# Patient Record
Sex: MALE | Race: BLACK OR AFRICAN AMERICAN | NOT HISPANIC OR LATINO | Employment: OTHER | URBAN - METROPOLITAN AREA
[De-identification: names, ages, dates, MRNs, and addresses within clinical notes are randomized per-mention and may not be internally consistent; named-entity substitution may affect disease eponyms.]

---

## 2017-01-03 ENCOUNTER — OFFICE VISIT (OUTPATIENT)
Dept: CARDIOLOGY | Facility: CLINIC | Age: 62
End: 2017-01-03
Payer: MEDICAID

## 2017-01-03 VITALS
SYSTOLIC BLOOD PRESSURE: 160 MMHG | BODY MASS INDEX: 23.62 KG/M2 | HEIGHT: 70 IN | WEIGHT: 165 LBS | HEART RATE: 80 BPM | DIASTOLIC BLOOD PRESSURE: 100 MMHG

## 2017-01-03 DIAGNOSIS — Z01.810 PREOPERATIVE CARDIOVASCULAR EXAMINATION: ICD-10-CM

## 2017-01-03 DIAGNOSIS — R94.31 ABNORMAL ECG: ICD-10-CM

## 2017-01-03 DIAGNOSIS — G89.4 CHRONIC PAIN SYNDROME: Primary | ICD-10-CM

## 2017-01-03 DIAGNOSIS — R00.2 PALPITATIONS: ICD-10-CM

## 2017-01-03 DIAGNOSIS — E78.49 OTHER HYPERLIPIDEMIA: ICD-10-CM

## 2017-01-03 DIAGNOSIS — I10 ESSENTIAL HYPERTENSION: ICD-10-CM

## 2017-01-03 PROCEDURE — 99213 OFFICE O/P EST LOW 20 MIN: CPT | Mod: PBBFAC,25 | Performed by: NURSE PRACTITIONER

## 2017-01-03 PROCEDURE — 99999 PR PBB SHADOW E&M-EST. PATIENT-LVL III: CPT | Mod: PBBFAC,,, | Performed by: NURSE PRACTITIONER

## 2017-01-03 PROCEDURE — 99214 OFFICE O/P EST MOD 30 MIN: CPT | Mod: S$PBB,,, | Performed by: NURSE PRACTITIONER

## 2017-01-03 PROCEDURE — 93010 ELECTROCARDIOGRAM REPORT: CPT | Mod: S$PBB,,, | Performed by: INTERNAL MEDICINE

## 2017-01-03 PROCEDURE — 93005 ELECTROCARDIOGRAM TRACING: CPT | Mod: PBBFAC | Performed by: INTERNAL MEDICINE

## 2017-01-03 RX ORDER — METOPROLOL SUCCINATE 25 MG/1
25 TABLET, EXTENDED RELEASE ORAL DAILY
Qty: 30 TABLET | Refills: 11 | Status: SHIPPED | OUTPATIENT
Start: 2017-01-03 | End: 2017-10-19 | Stop reason: SDUPTHER
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
## 2017-01-03 NOTE — MR AVS SNAPSHOT
[facility] - Cardiology  [address] [address]  Phone: [phone]  Fax: [phone]                  Cooper Pope Jr.   1/3/2017 1:30 PM   Office Visit    Description:  Male : 1955   Provider:  Mesfin Gracia NP   Department:  [facility] - Cardiology           Reason for Visit     Hypertension     Chest Pain           Diagnoses this Visit        Comments    Chronic pain syndrome    -  Primary     Essential hypertension         Palpitations         Abnormal ECG         Preoperative cardiovascular examination         Other hyperlipidemia                To Do List           Future Appointments        Provider Department Dept Phone    2017 9:15 AM [facility] [address] [facility] [phone]    2017 10:30 AM Cooper Harper MD [facility] - Neurosurgery [facility] [phone]    2017 9:40 AM LABORATORY, [address] [facility] [phone]    2017 10:00 AM Cooper Parekh IV, MD [facility] Urology [phone]      Goals (5 Years of Data)     None      Follow-Up and Disposition     Return in about 3 weeks (around 2017).       These Medications        Disp Refills Start End    metoprolol succinate (TOPROL-XL) 25 MG 24 hr tablet 30 tablet 11 1/3/2017     Take 1 tablet (25 mg total) by mouth once daily. - Oral    [facility] [address] #: [phone]         [facility]     [facility] -  Assistance  Registered nurses in the [facility] provide clinical advisement, health education, appointment booking, and other advisory services.  Call for this free service at [phone].             Medications           START taking these NEW medications        Refills    metoprolol succinate (TOPROL-XL) 25 MG 24 hr tablet 11    Sig: Take 1 tablet (25 mg total) by mouth once daily.    Class: Normal    Route: Oral      STOP taking these  "medications     nebivolol 20 mg Tab Take 20 mg by mouth 2 (two) times daily. Patient takes 2 tablets every am           Verify that the below list of medications is an accurate representation of the medications you are currently taking.  If none reported, the list may be blank. If incorrect, please contact your healthcare provider. Carry this list with you in case of emergency.           Current Medications     amlodipine-benazepril 5-20 mg (LOTREL) 5-20 mg per capsule Take 2 capsules by mouth once daily.    atorvastatin (LIPITOR) 10 MG tablet Take 1 tablet (10 mg total) by mouth once daily.    cloNIDine (CATAPRES) 0.3 MG tablet Take 1 tablet (0.3 mg total) by mouth 3 (three) times daily.    cyanocobalamin (VITAMIN B-12) 1,000 mcg/mL injection Inject 1 mL (1,000 mcg total) into the muscle every 30 days.    donepezil (ARICEPT) 10 MG tablet Take 1 tablet (10 mg total) by mouth every evening.    furosemide (LASIX) 40 MG tablet Take 1 tablet (40 mg total) by mouth once daily.    megestrol (MEGACE) 400 mg/10 mL (40 mg/mL) Susp Take 20 mLs (800 mg total) by mouth once daily.    metoprolol succinate (TOPROL-XL) 25 MG 24 hr tablet Take 1 tablet (25 mg total) by mouth once daily.    multivit-iron-FA-calcium-mins 9 mg iron-400 mcg Tab tablet Take 1 tablet by mouth once daily.    oxycodone (ROXICODONE) 30 MG Tab Take 1 tablet (30 mg total) by mouth daily as needed.    promethazine (PHENERGAN) 25 MG tablet Take 1 tablet (25 mg total) by mouth 2 (two) times daily as needed for Nausea.           Clinical Reference Information           Vital Signs - Last Recorded  Most recent update: 1/3/2017  1:52 PM by Altagracia Ceballos LPN    BP Pulse Ht Wt BMI    (!) 160/100 (BP Location: Left arm, Patient Position: Sitting, BP Method: Manual) 80 5' 10" (1.778 m) 74.9 kg (165 lb 0.2 oz) 23.68 kg/m2      Blood Pressure          Most Recent Value    BP  (!)  160/100      Allergies as of 1/3/2017     Tylenol [Acetaminophen]      Immunizations " Administered on Date of Encounter - 1/3/2017     None      Orders Placed During Today's Visit      Normal Orders This Visit    EKG 12-lead
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
## 2017-01-16 ENCOUNTER — OFFICE VISIT (OUTPATIENT)
Dept: FAMILY MEDICINE | Facility: CLINIC | Age: 62
End: 2017-01-16
Payer: MEDICAID

## 2017-01-16 VITALS
OXYGEN SATURATION: 98 % | TEMPERATURE: 98 F | HEART RATE: 94 BPM | DIASTOLIC BLOOD PRESSURE: 116 MMHG | WEIGHT: 162.25 LBS | HEIGHT: 70 IN | SYSTOLIC BLOOD PRESSURE: 170 MMHG | BODY MASS INDEX: 23.23 KG/M2 | RESPIRATION RATE: 18 BRPM

## 2017-01-16 DIAGNOSIS — M48.061 SPINAL STENOSIS OF LUMBAR REGION WITH RADICULOPATHY: ICD-10-CM

## 2017-01-16 DIAGNOSIS — G89.4 CHRONIC PAIN SYNDROME: ICD-10-CM

## 2017-01-16 DIAGNOSIS — I10 ESSENTIAL HYPERTENSION: Primary | ICD-10-CM

## 2017-01-16 DIAGNOSIS — M54.16 SPINAL STENOSIS OF LUMBAR REGION WITH RADICULOPATHY: ICD-10-CM

## 2017-01-16 DIAGNOSIS — R11.0 NAUSEA: ICD-10-CM

## 2017-01-16 PROCEDURE — 99214 OFFICE O/P EST MOD 30 MIN: CPT | Mod: S$PBB,,, | Performed by: FAMILY MEDICINE

## 2017-01-16 PROCEDURE — 99215 OFFICE O/P EST HI 40 MIN: CPT | Mod: PBBFAC,PO | Performed by: FAMILY MEDICINE

## 2017-01-16 PROCEDURE — 99999 PR PBB SHADOW E&M-EST. PATIENT-LVL V: CPT | Mod: PBBFAC,,, | Performed by: FAMILY MEDICINE

## 2017-01-16 RX ORDER — OXYCODONE HYDROCHLORIDE 30 MG/1
30 TABLET ORAL DAILY PRN
Qty: 30 TABLET | Refills: 0 | Status: SHIPPED | OUTPATIENT
Start: 2017-01-16 | End: 2017-02-13 | Stop reason: SDUPTHER

## 2017-01-16 RX ORDER — PROMETHAZINE HYDROCHLORIDE 25 MG/1
25 TABLET ORAL 2 TIMES DAILY PRN
Qty: 60 TABLET | Refills: 0 | Status: SHIPPED | OUTPATIENT
Start: 2017-01-16 | End: 2017-02-13 | Stop reason: SDUPTHER
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
## 2017-01-16 NOTE — MR AVS SNAPSHOT
[facility]  [address] [facility] [address]  Phone: [phone]                  Cooper Pope Jr.   2017 1:40 PM   Office Visit    Description:  Male : 1955   Provider:  Leigha Diallo MD   Department:  [facility]           Reason for Visit     Back Pain           Diagnoses this Visit        Comments    Intractable vomiting with nausea, unspecified vomiting type         Spinal stenosis of lumbar region with radiculopathy         Chronic pain syndrome                To Do List           Future Appointments        Provider Department Dept Phone    2017 1:00 PM Mesfin Gracia NP [facility] [phone]    2017 9:15 AM [facility] CT2 64- LIMIT 600 LBS [facility] [phone]    2017 10:30 AM Cooper Harper MD [facility] - Neurosurgery [facility] [phone]    2017 11:00 AM Leigha Diallo MD [facility] [phone]    2017 9:40 AM LABORATORY, [address] [facility] [phone]      Goals (5 Years of Data)     None       These Medications        Disp Refills Start End    promethazine (PHENERGAN) 25 MG tablet 60 tablet 0 2017     Take 1 tablet (25 mg total) by mouth 2 (two) times daily as needed for Nausea. - Oral    [facility] [address] Ph #: [phone]       oxycodone (ROXICODONE) 30 MG Tab 30 tablet 0 2017     Take 1 tablet (30 mg total) by mouth daily as needed. - Oral    Ph[facility]: [facility] [address] Ph #: [phone]         [facility]     [facility] Nurse Care [facility] -  Assistance  Registered nurses in the [facility] provide clinical advisement, health education, appointment booking, and other advisory services.  Call for this free service at [phone].             Medications                Verify that the below list of medications is an  "accurate representation of the medications you are currently taking.  If none reported, the list may be blank. If incorrect, please contact your healthcare provider. Carry this list with you in case of emergency.           Current Medications     amlodipine-benazepril 5-20 mg (LOTREL) 5-20 mg per capsule Take 2 capsules by mouth once daily.    atorvastatin (LIPITOR) 10 MG tablet Take 1 tablet (10 mg total) by mouth once daily.    cloNIDine (CATAPRES) 0.3 MG tablet Take 1 tablet (0.3 mg total) by mouth 3 (three) times daily.    cyanocobalamin (VITAMIN B-12) 1,000 mcg/mL injection Inject 1 mL (1,000 mcg total) into the muscle every 30 days.    donepezil (ARICEPT) 10 MG tablet Take 1 tablet (10 mg total) by mouth every evening.    furosemide (LASIX) 40 MG tablet Take 1 tablet (40 mg total) by mouth once daily.    megestrol (MEGACE) 400 mg/10 mL (40 mg/mL) Susp Take 20 mLs (800 mg total) by mouth once daily.    metoprolol succinate (TOPROL-XL) 25 MG 24 hr tablet Take 1 tablet (25 mg total) by mouth once daily.    multivit-iron-FA-calcium-mins 9 mg iron-400 mcg Tab tablet Take 1 tablet by mouth once daily.    oxycodone (ROXICODONE) 30 MG Tab Take 1 tablet (30 mg total) by mouth daily as needed.    promethazine (PHENERGAN) 25 MG tablet Take 1 tablet (25 mg total) by mouth 2 (two) times daily as needed for Nausea.           Clinical Reference Information           Vital Signs - Last Recorded  Most recent update: 1/16/2017  1:57 PM by Bhumika Piña MA    BP Pulse Temp Resp    (!) 170/116 (BP Location: Right arm, Patient Position: Sitting, BP Method: Manual) 94 97.5 °F (36.4 °C) (Tympanic) 18    Ht Wt SpO2 BMI    5' 10" (1.778 m) 73.6 kg (162 lb 4.1 oz) 98% 23.28 kg/m2      Blood Pressure          Most Recent Value    BP  (!)  170/116      Allergies as of 1/16/2017     Tylenol [Acetaminophen]      Immunizations Administered on Date of Encounter - 1/16/2017     None      Orders Placed During Today's Visit      Normal " Orders This Visit    Ambulatory referral to Pain Clinic

## 2017-01-19 ENCOUNTER — OFFICE VISIT (OUTPATIENT)
Dept: PAIN MEDICINE | Facility: CLINIC | Age: 62
End: 2017-01-19
Payer: MEDICAID

## 2017-01-19 VITALS
HEIGHT: 72 IN | WEIGHT: 167 LBS | BODY MASS INDEX: 22.62 KG/M2 | SYSTOLIC BLOOD PRESSURE: 193 MMHG | DIASTOLIC BLOOD PRESSURE: 117 MMHG | HEART RATE: 92 BPM

## 2017-01-19 DIAGNOSIS — G89.4 CHRONIC PAIN SYNDROME: Primary | ICD-10-CM

## 2017-01-19 DIAGNOSIS — M96.1 FAILED BACK SYNDROME OF LUMBAR SPINE: ICD-10-CM

## 2017-01-19 PROCEDURE — 99213 OFFICE O/P EST LOW 20 MIN: CPT | Mod: PBBFAC | Performed by: ANESTHESIOLOGY

## 2017-01-19 PROCEDURE — 99203 OFFICE O/P NEW LOW 30 MIN: CPT | Mod: S$PBB,,, | Performed by: ANESTHESIOLOGY

## 2017-01-19 PROCEDURE — 99999 PR PBB SHADOW E&M-EST. PATIENT-LVL III: CPT | Mod: PBBFAC,,, | Performed by: ANESTHESIOLOGY

## 2017-01-19 NOTE — LETTER
January 19, 2017      Leigha Diallo MD  [address]  [facility] [address]           [facility]  [id number] [place] [address]  Phone: [phone]  Fax: [phone]          Patient: Cooper Pope Jr.   MR Number: [id number]   YOB: 1955   Date of Visit: 1/19/2017       Dear Dr. Leigha Diallo:    Thank you for referring Cooper Pope to me for evaluation. Attached you will find relevant portions of my assessment and plan of care.    If you have questions, please do not hesitate to call me. I look forward to following Cooper Pope along with you.    Sincerely,    Yogi Holloway MD    Enclosure  CC:  No Recipients    If you would like to receive this communication electronically, please contact [email or web address] or [phone] to request more information on mytrax Link access.    For providers and/or their staff who would like to refer a patient to [facility], please contact us through our one-stop-shop provider referral line, [facility] , at [phone].    If you feel you have received this communication in error or would no longer like to receive these types of communications, please e-mail [email or web address]

## 2017-01-27 ENCOUNTER — TELEPHONE (OUTPATIENT)
Dept: NEUROSURGERY | Facility: CLINIC | Age: 62
End: 2017-01-27

## 2017-01-27 NOTE — TELEPHONE ENCOUNTER
Spoke with pt. CT and follow up appointment with Dr. Harper rescheduled for 2/20/2017. Appointment slips in the mail. Pt verbalized understanding.

## 2017-01-27 NOTE — TELEPHONE ENCOUNTER
----- Message from Liss Sprague sent at 1/26/2017  4:05 PM CST -----  Contact: self [phone]  Please call him to reschedule the CT and your appt.  Thank you!

## 2017-02-09 ENCOUNTER — TELEPHONE (OUTPATIENT)
Dept: FAMILY MEDICINE | Facility: CLINIC | Age: 62
End: 2017-02-09

## 2017-02-09 NOTE — TELEPHONE ENCOUNTER
----- Message from Brandee Aceves sent at 2/8/2017  1:43 PM CST -----  Contact: Pt  Pt request call from nurse because the pain Dr he was referred to states his insurance will not pay for injections, please contact pt at [phone]

## 2017-02-13 ENCOUNTER — OFFICE VISIT (OUTPATIENT)
Dept: FAMILY MEDICINE | Facility: CLINIC | Age: 62
End: 2017-02-13
Payer: MEDICAID

## 2017-02-13 ENCOUNTER — TELEPHONE (OUTPATIENT)
Dept: FAMILY MEDICINE | Facility: CLINIC | Age: 62
End: 2017-02-13

## 2017-02-13 VITALS
HEIGHT: 70 IN | TEMPERATURE: 99 F | RESPIRATION RATE: 18 BRPM | DIASTOLIC BLOOD PRESSURE: 90 MMHG | SYSTOLIC BLOOD PRESSURE: 140 MMHG | WEIGHT: 160.5 LBS | BODY MASS INDEX: 22.98 KG/M2 | HEART RATE: 86 BPM

## 2017-02-13 DIAGNOSIS — R63.4 WEIGHT LOSS: ICD-10-CM

## 2017-02-13 DIAGNOSIS — I10 HTN (HYPERTENSION), BENIGN: ICD-10-CM

## 2017-02-13 DIAGNOSIS — E53.8 VITAMIN B12 DEFICIENCY: ICD-10-CM

## 2017-02-13 DIAGNOSIS — M48.061 SPINAL STENOSIS OF LUMBAR REGION WITH RADICULOPATHY: Primary | ICD-10-CM

## 2017-02-13 DIAGNOSIS — R11.0 NAUSEA: ICD-10-CM

## 2017-02-13 DIAGNOSIS — M54.16 SPINAL STENOSIS OF LUMBAR REGION WITH RADICULOPATHY: Primary | ICD-10-CM

## 2017-02-13 DIAGNOSIS — G89.4 CHRONIC PAIN SYNDROME: ICD-10-CM

## 2017-02-13 PROCEDURE — 99213 OFFICE O/P EST LOW 20 MIN: CPT | Mod: PBBFAC,PO | Performed by: FAMILY MEDICINE

## 2017-02-13 PROCEDURE — 99999 PR PBB SHADOW E&M-EST. PATIENT-LVL III: CPT | Mod: PBBFAC,,, | Performed by: FAMILY MEDICINE

## 2017-02-13 PROCEDURE — 99214 OFFICE O/P EST MOD 30 MIN: CPT | Mod: S$PBB,,, | Performed by: FAMILY MEDICINE

## 2017-02-13 RX ORDER — CLONIDINE HYDROCHLORIDE 0.3 MG/1
0.3 TABLET ORAL 3 TIMES DAILY
Qty: 90 TABLET | Refills: 5 | Status: SHIPPED | OUTPATIENT
Start: 2017-02-13 | End: 2017-08-02 | Stop reason: SDUPTHER

## 2017-02-13 RX ORDER — PROMETHAZINE HYDROCHLORIDE 25 MG/1
25 TABLET ORAL 2 TIMES DAILY PRN
Qty: 60 TABLET | Refills: 0 | Status: SHIPPED | OUTPATIENT
Start: 2017-02-13 | End: 2017-08-02 | Stop reason: SDUPTHER

## 2017-02-13 RX ORDER — OXYCODONE HYDROCHLORIDE 30 MG/1
30 TABLET ORAL DAILY PRN
Qty: 30 TABLET | Refills: 0 | Status: SHIPPED | OUTPATIENT
Start: 2017-02-13 | End: 2017-04-13 | Stop reason: SDUPTHER

## 2017-02-13 RX ORDER — CYANOCOBALAMIN 1000 UG/ML
1000 INJECTION, SOLUTION INTRAMUSCULAR; SUBCUTANEOUS
Qty: 10 ML | Refills: 5 | Status: SHIPPED | OUTPATIENT
Start: 2017-02-13 | End: 2017-08-02 | Stop reason: SDUPTHER

## 2017-02-13 RX ORDER — MEGESTROL ACETATE 40 MG/ML
800 SUSPENSION ORAL DAILY
Qty: 480 ML | Refills: 0 | Status: SHIPPED | OUTPATIENT
Start: 2017-02-13 | End: 2017-06-14 | Stop reason: SDUPTHER

## 2017-02-13 NOTE — MR AVS SNAPSHOT
[facility]  [address] [facility]  [place] [address]  Phone: [phone]                  Cooper Pope Jr.   2017 1:00 PM   Office Visit    Description:  Male : 1955   Provider:  Leigha Diallo MD   Department:  [facility]           Reason for Visit     Back Pain           Diagnoses this Visit        Comments    Spinal stenosis of lumbar region    -  Primary     Nausea         Vitamin B12 deficiency         HTN (hypertension), benign         Weight loss         Spinal stenosis of lumbar region with radiculopathy         Chronic pain syndrome                To Do List           Future Appointments        Provider Department Dept Phone    2017 10:30 AM [facility] CT OP LIMIT 450 LBS [facility] [phone]    2017 11:30 AM Cooper Harper MD [facility] Spine Services [phone]    2017 11:00 AM Leigha Diallo MD [facility] [phone]    2017 9:40 AM LABORATORY, [address] [facility] [phone]    2017 10:00 AM [facility] [phone]      Goals (5 Years of Data)     None       These Medications        Disp Refills Start End    promethazine (PHENERGAN) 25 MG tablet 60 tablet 0 2017     Take 1 tablet (25 mg total) by mouth 2 (two) times daily as needed for Nausea. - Oral    Pharmacy: [facility] [place] [address] #: [phone]       cyanocobalamin (VITAMIN B-12) 1,000 mcg/mL injection 10 mL 5 2017     Inject 1 mL (1,000 mcg total) into the muscle every 30 days. - Intramuscular    Pharmacy: [facility] [place] - [address] [place] [address] Ph #: [phone]       cloNIDine (CATAPRES) 0.3 MG tablet 90 tablet 5 2017     Take 1 tablet (0.3 mg total) by mouth 3 (three) times daily. - Oral    Pharmacy: [facility] [place] - [address] [place] [address] Ph #: [phone]       megestrol (MEGACE) 400 mg/10 mL  (40 mg/mL) Susp 480 mL 0 2/13/2017 2/13/2018    Take 20 mLs (800 mg total) by mouth once daily. - Oral    Pharmacy: [facility] [address] #: [phone]       oxycodone (ROXICODONE) 30 MG Tab 30 tablet 0 2/13/2017     Take 1 tablet (30 mg total) by mouth daily as needed. - Oral    Pharmacy: [facility] [address] #: [phone]         [facility]     [facility] On Call Nurse Care Line - 24/7 Assistance  Registered nurses in the [facility] On Call Center provide clinical advisement, health education, appointment booking, and other advisory services.  Call for this free service at [phone].             Medications                Verify that the below list of medications is an accurate representation of the medications you are currently taking.  If none reported, the list may be blank. If incorrect, please contact your healthcare provider. Carry this list with you in case of emergency.           Current Medications     amlodipine-benazepril 5-20 mg (LOTREL) 5-20 mg per capsule Take 2 capsules by mouth once daily.    atorvastatin (LIPITOR) 10 MG tablet Take 1 tablet (10 mg total) by mouth once daily.    cloNIDine (CATAPRES) 0.3 MG tablet Take 1 tablet (0.3 mg total) by mouth 3 (three) times daily.    cyanocobalamin (VITAMIN B-12) 1,000 mcg/mL injection Inject 1 mL (1,000 mcg total) into the muscle every 30 days.    donepezil (ARICEPT) 10 MG tablet Take 1 tablet (10 mg total) by mouth every evening.    furosemide (LASIX) 40 MG tablet Take 1 tablet (40 mg total) by mouth once daily.    megestrol (MEGACE) 400 mg/10 mL (40 mg/mL) Susp Take 20 mLs (800 mg total) by mouth once daily.    metoprolol succinate (TOPROL-XL) 25 MG 24 hr tablet Take 1 tablet (25 mg total) by mouth once daily.    multivit-iron-FA-calcium-mins 9 mg iron-400 mcg Tab tablet Take 1 tablet by mouth once daily.    oxycodone (ROXICODONE) 30 MG Tab Take 1 tablet (30 mg total) by mouth daily as  "needed.    promethazine (PHENERGAN) 25 MG tablet Take 1 tablet (25 mg total) by mouth 2 (two) times daily as needed for Nausea.           Clinical Reference Information           Your Vitals Were     BP Pulse Temp Resp Height Weight    140/90 86 98.5 °F (36.9 °C) 18 5' 10" (1.778 m) 72.8 kg (160 lb 7.9 oz)    BMI                23.03 kg/m2          Blood Pressure          Most Recent Value    BP  (!)  140/90      Allergies as of 2/13/2017     Tylenol [Acetaminophen]      Immunizations Administered on Date of Encounter - 2/13/2017     None      Orders Placed During Today's Visit      Normal Orders This Visit    Ambulatory referral to Pain Clinic       Language Assistance Services     ATTENTION: Language assistance services are available, free of charge. Please call [phone].      ATENCIÓN: Si carla mir, tiene a carroll disposición servicios gratuitos de asistencia lingüística. Llame al [phone].     ROXANNE Ý: N?u b?n nói Ti?ng Vi?t, có các d?ch v? h? tr? ngôn ng? mi?n phí dành cho b?n. G?i s? [phone].         [facility] complies with applicable Federal civil rights laws and does not discriminate on the basis of race, color, national origin, age, disability, or sex.        "

## 2017-02-13 NOTE — TELEPHONE ENCOUNTER
----- Message from Leigha Diallo MD sent at 2/13/2017  9:20 AM CST -----  Ms. Jmaes     This patient is coming in today. According to our policy, we can not give chronic pain medicine.   He has seen pain management at [facility] and it was not recommended that he continue oxycodone. Please let him know I would be happy to see him, but at the most I can do today is offer Norco ( for 1 last time).I can send him to a different pain medicine doctor or he can find another doctor that will be able to give him pain medicine.     Thanks
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
## 2017-02-20 ENCOUNTER — HOSPITAL ENCOUNTER (OUTPATIENT)
Dept: RADIOLOGY | Facility: HOSPITAL | Age: 62
Discharge: HOME | End: 2017-02-20
Attending: NEUROLOGICAL SURGERY
Payer: MEDICAID

## 2017-02-20 ENCOUNTER — OFFICE VISIT (OUTPATIENT)
Dept: SPINE | Facility: CLINIC | Age: 62
End: 2017-02-20
Attending: NEUROLOGICAL SURGERY
Payer: MEDICAID

## 2017-02-20 VITALS
BODY MASS INDEX: 23.19 KG/M2 | SYSTOLIC BLOOD PRESSURE: 178 MMHG | DIASTOLIC BLOOD PRESSURE: 122 MMHG | WEIGHT: 162 LBS | HEIGHT: 70 IN | HEART RATE: 84 BPM

## 2017-02-20 DIAGNOSIS — M43.8X9 SAGITTAL PLANE IMBALANCE: ICD-10-CM

## 2017-02-20 DIAGNOSIS — M54.16 LUMBAR RADICULOPATHY: Primary | ICD-10-CM

## 2017-02-20 PROCEDURE — 99213 OFFICE O/P EST LOW 20 MIN: CPT | Mod: PBBFAC | Performed by: NEUROLOGICAL SURGERY

## 2017-02-20 PROCEDURE — 72131 CT LUMBAR SPINE W/O DYE: CPT | Mod: 26,,, | Performed by: RADIOLOGY

## 2017-02-20 PROCEDURE — 99214 OFFICE O/P EST MOD 30 MIN: CPT | Mod: S$PBB,,, | Performed by: NEUROLOGICAL SURGERY

## 2017-02-20 PROCEDURE — 99999 PR PBB SHADOW E&M-EST. PATIENT-LVL III: CPT | Mod: PBBFAC,,, | Performed by: NEUROLOGICAL SURGERY
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
## 2017-04-13 ENCOUNTER — OFFICE VISIT (OUTPATIENT)
Dept: FAMILY MEDICINE | Facility: CLINIC | Age: 62
End: 2017-04-13
Payer: MEDICAID

## 2017-04-13 ENCOUNTER — PATIENT OUTREACH (OUTPATIENT)
Dept: ADMINISTRATIVE | Facility: HOSPITAL | Age: 62
End: 2017-04-13
Payer: MEDICAID

## 2017-04-13 VITALS
HEART RATE: 92 BPM | BODY MASS INDEX: 22.63 KG/M2 | TEMPERATURE: 98 F | RESPIRATION RATE: 16 BRPM | SYSTOLIC BLOOD PRESSURE: 160 MMHG | HEIGHT: 70 IN | WEIGHT: 158.06 LBS | DIASTOLIC BLOOD PRESSURE: 100 MMHG | OXYGEN SATURATION: 100 %

## 2017-04-13 DIAGNOSIS — G89.4 CHRONIC PAIN SYNDROME: ICD-10-CM

## 2017-04-13 DIAGNOSIS — M48.061 SPINAL STENOSIS OF LUMBAR REGION WITH RADICULOPATHY: ICD-10-CM

## 2017-04-13 DIAGNOSIS — M54.16 SPINAL STENOSIS OF LUMBAR REGION WITH RADICULOPATHY: ICD-10-CM

## 2017-04-13 PROCEDURE — 80307 DRUG TEST PRSMV CHEM ANLYZR: CPT

## 2017-04-13 PROCEDURE — 99214 OFFICE O/P EST MOD 30 MIN: CPT | Mod: S$PBB,,, | Performed by: FAMILY MEDICINE

## 2017-04-13 PROCEDURE — 99999 PR PBB SHADOW E&M-EST. PATIENT-LVL IV: CPT | Mod: PBBFAC,,, | Performed by: FAMILY MEDICINE

## 2017-04-13 PROCEDURE — 99214 OFFICE O/P EST MOD 30 MIN: CPT | Mod: PBBFAC,PO | Performed by: FAMILY MEDICINE

## 2017-04-13 RX ORDER — OXYCODONE HYDROCHLORIDE 30 MG/1
30 TABLET ORAL DAILY PRN
Qty: 20 TABLET | Refills: 0 | Status: SHIPPED | OUTPATIENT
Start: 2017-04-13 | End: 2017-05-01 | Stop reason: SDUPTHER
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
## 2017-04-14 LAB
AMPHET+METHAMPHET UR QL: NEGATIVE
BARBITURATES UR QL SCN>200 NG/ML: NEGATIVE
BENZODIAZ UR QL SCN>200 NG/ML: NEGATIVE
BZE UR QL SCN: NEGATIVE
CANNABINOIDS UR QL SCN: NEGATIVE
CREAT UR-MCNC: 203 MG/DL
ETHANOL UR-MCNC: <10 MG/DL
METHADONE UR QL SCN>300 NG/ML: NEGATIVE
OPIATES UR QL SCN: NORMAL
PCP UR QL SCN>25 NG/ML: NEGATIVE
TOXICOLOGY INFORMATION: NORMAL

## 2017-05-01 ENCOUNTER — OFFICE VISIT (OUTPATIENT)
Dept: FAMILY MEDICINE | Facility: CLINIC | Age: 62
End: 2017-05-01
Payer: MEDICAID

## 2017-05-01 ENCOUNTER — HOSPITAL ENCOUNTER (OUTPATIENT)
Dept: RADIOLOGY | Facility: HOSPITAL | Age: 62
Discharge: HOME | End: 2017-05-01
Attending: FAMILY MEDICINE
Payer: MEDICAID

## 2017-05-01 VITALS
RESPIRATION RATE: 16 BRPM | OXYGEN SATURATION: 99 % | HEIGHT: 71 IN | BODY MASS INDEX: 22.31 KG/M2 | HEART RATE: 84 BPM | DIASTOLIC BLOOD PRESSURE: 100 MMHG | SYSTOLIC BLOOD PRESSURE: 140 MMHG | WEIGHT: 159.38 LBS | TEMPERATURE: 97 F
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
## 2017-05-01 DIAGNOSIS — M54.16 SPINAL STENOSIS OF LUMBAR REGION WITH RADICULOPATHY: ICD-10-CM

## 2017-05-01 DIAGNOSIS — M48.061 SPINAL STENOSIS OF LUMBAR REGION WITH RADICULOPATHY: ICD-10-CM

## 2017-05-01 DIAGNOSIS — G89.4 CHRONIC PAIN SYNDROME: ICD-10-CM
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
## 2017-05-01 PROCEDURE — 72100 X-RAY EXAM L-S SPINE 2/3 VWS: CPT | Mod: 26,,, | Performed by: RADIOLOGY

## 2017-05-01 PROCEDURE — 99214 OFFICE O/P EST MOD 30 MIN: CPT | Mod: S$PBB,,, | Performed by: FAMILY MEDICINE

## 2017-05-01 PROCEDURE — 72100 X-RAY EXAM L-S SPINE 2/3 VWS: CPT | Mod: TC,PO

## 2017-05-01 PROCEDURE — 99999 PR PBB SHADOW E&M-EST. PATIENT-LVL IV: CPT | Mod: PBBFAC,,, | Performed by: FAMILY MEDICINE

## 2017-05-01 RX ORDER — OXYCODONE HYDROCHLORIDE 30 MG/1
30 TABLET ORAL DAILY PRN
Qty: 10 TABLET | Refills: 0 | Status: SHIPPED | OUTPATIENT
Start: 2017-05-01 | End: 2017-08-02 | Stop reason: SDUPTHER
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
## 2017-05-04 ENCOUNTER — TELEPHONE (OUTPATIENT)
Dept: FAMILY MEDICINE | Facility: CLINIC | Age: 62
End: 2017-05-04

## 2017-05-04 NOTE — TELEPHONE ENCOUNTER
----- Message from Cuco Quintero sent at 5/4/2017  1:46 PM CDT -----  Contact: pt   States he is rtn nurses call and can be reached at [phone]//thanks/dbw

## 2017-05-04 NOTE — TELEPHONE ENCOUNTER
----- Message from Denis May sent at 5/4/2017  1:07 PM CDT -----  Contact: [phone]  Returning call,plesase call back @ [phone].  Thanks-[facility]

## 2017-05-15 ENCOUNTER — HOSPITAL ENCOUNTER (OUTPATIENT)
Dept: RADIOLOGY | Facility: OTHER | Age: 62
Discharge: HOME | End: 2017-05-15
Attending: NEUROLOGICAL SURGERY
Payer: MEDICAID

## 2017-05-15 ENCOUNTER — OFFICE VISIT (OUTPATIENT)
Dept: SPINE | Facility: CLINIC | Age: 62
End: 2017-05-15
Attending: NEUROLOGICAL SURGERY
Payer: MEDICAID

## 2017-05-15 VITALS
DIASTOLIC BLOOD PRESSURE: 121 MMHG | HEART RATE: 83 BPM | BODY MASS INDEX: 22.26 KG/M2 | WEIGHT: 159 LBS | SYSTOLIC BLOOD PRESSURE: 181 MMHG | HEIGHT: 71 IN

## 2017-05-15 DIAGNOSIS — M54.16 LUMBAR RADICULOPATHY: ICD-10-CM

## 2017-05-15 DIAGNOSIS — M25.551 RIGHT HIP PAIN: ICD-10-CM

## 2017-05-15 DIAGNOSIS — Z98.1 HISTORY OF LUMBAR FUSION: ICD-10-CM

## 2017-05-15 DIAGNOSIS — M41.50 DEGENERATIVE SCOLIOSIS IN ADULT PATIENT: Primary | ICD-10-CM

## 2017-05-15 DIAGNOSIS — M25.551 RIGHT HIP PAIN: Primary | ICD-10-CM

## 2017-05-15 PROCEDURE — 99213 OFFICE O/P EST LOW 20 MIN: CPT | Mod: PBBFAC,25 | Performed by: NEUROLOGICAL SURGERY

## 2017-05-15 PROCEDURE — 72082 X-RAY EXAM ENTIRE SPI 2/3 VW: CPT | Mod: 26,,, | Performed by: RADIOLOGY

## 2017-05-15 PROCEDURE — 99214 OFFICE O/P EST MOD 30 MIN: CPT | Mod: S$PBB,,, | Performed by: NEUROLOGICAL SURGERY

## 2017-05-15 PROCEDURE — 72148 MRI LUMBAR SPINE W/O DYE: CPT | Mod: 26,,, | Performed by: RADIOLOGY

## 2017-05-15 PROCEDURE — 99999 PR PBB SHADOW E&M-EST. PATIENT-LVL III: CPT | Mod: PBBFAC,,, | Performed by: NEUROLOGICAL SURGERY
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
## 2017-06-14 DIAGNOSIS — R63.4 WEIGHT LOSS: ICD-10-CM

## 2017-06-14 RX ORDER — MEGESTROL ACETATE 40 MG/ML
800 SUSPENSION ORAL DAILY
Qty: 480 ML | Refills: 0 | Status: SHIPPED | OUTPATIENT
Start: 2017-06-14 | End: 2017-07-21 | Stop reason: SDUPTHER

## 2017-06-23 ENCOUNTER — LAB VISIT (OUTPATIENT)
Dept: LAB | Facility: HOSPITAL | Age: 62
End: 2017-06-23
Attending: UROLOGY
Payer: MEDICAID

## 2017-06-23 DIAGNOSIS — Z12.5 PROSTATE CANCER SCREENING: ICD-10-CM

## 2017-06-23 LAB — COMPLEXED PSA SERPL-MCNC: 0.73 NG/ML

## 2017-06-23 PROCEDURE — 84153 ASSAY OF PSA TOTAL: CPT

## 2017-06-23 PROCEDURE — 36415 COLL VENOUS BLD VENIPUNCTURE: CPT | Mod: PO

## 2017-06-26 ENCOUNTER — TELEPHONE (OUTPATIENT)
Dept: UROLOGY | Facility: CLINIC | Age: 62
End: 2017-06-26

## 2017-06-26 NOTE — TELEPHONE ENCOUNTER
----- Message from Maggie Stinson sent at 6/26/2017 11:51 AM CDT -----  Was late for appt today, would like to reschedule next available isnt until 8/23. Please call back at [phone]. thanks

## 2017-07-03 ENCOUNTER — OFFICE VISIT (OUTPATIENT)
Dept: UROLOGY | Facility: CLINIC | Age: 62
End: 2017-07-03
Payer: MEDICAID

## 2017-07-03 VITALS — SYSTOLIC BLOOD PRESSURE: 148 MMHG | DIASTOLIC BLOOD PRESSURE: 98 MMHG | WEIGHT: 159 LBS | BODY MASS INDEX: 22.18 KG/M2

## 2017-07-03 DIAGNOSIS — Z12.5 PROSTATE CANCER SCREENING: ICD-10-CM

## 2017-07-03 DIAGNOSIS — C64.9 RENAL CELL CARCINOMA, UNSPECIFIED LATERALITY: Primary | ICD-10-CM

## 2017-07-03 PROCEDURE — 99213 OFFICE O/P EST LOW 20 MIN: CPT | Mod: PBBFAC | Performed by: UROLOGY

## 2017-07-03 PROCEDURE — 99214 OFFICE O/P EST MOD 30 MIN: CPT | Mod: S$PBB,,, | Performed by: UROLOGY

## 2017-07-03 PROCEDURE — 99999 PR PBB SHADOW E&M-EST. PATIENT-LVL III: CPT | Mod: PBBFAC,,, | Performed by: UROLOGY
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
## 2017-07-06 ENCOUNTER — TELEPHONE (OUTPATIENT)
Dept: UROLOGY | Facility: CLINIC | Age: 62
End: 2017-07-06

## 2017-07-06 ENCOUNTER — TELEPHONE (OUTPATIENT)
Dept: RADIOLOGY | Facility: HOSPITAL | Age: 62
End: 2017-07-06

## 2017-07-06 DIAGNOSIS — C64.9 KIDNEY CANCER, PRIMARY, WITH METASTASIS FROM KIDNEY TO OTHER SITE, UNSPECIFIED LATERALITY: Primary | ICD-10-CM

## 2017-07-06 NOTE — TELEPHONE ENCOUNTER
----- Message from Ronda Dudley sent at 7/6/2017  9:02 AM CDT -----  Good Morning!    This patient is scheduled to have a radiology exam. We will need a recent creatinine for this patient. Please place a stat order and schedule the patient for 1 1/2 hour prior to radiology appointment. If you have any questions please contact Radiology at [phone].    Thank You,     Ronda Dudley  Radiology Dept

## 2017-07-10 ENCOUNTER — TELEPHONE (OUTPATIENT)
Dept: RADIOLOGY | Facility: HOSPITAL | Age: 62
End: 2017-07-10

## 2017-07-11 ENCOUNTER — HOSPITAL ENCOUNTER (OUTPATIENT)
Dept: RADIOLOGY | Facility: HOSPITAL | Age: 62
Discharge: HOME | End: 2017-07-11
Attending: UROLOGY
Payer: MEDICAID

## 2017-07-11 DIAGNOSIS — C64.9 RENAL CELL CARCINOMA, UNSPECIFIED LATERALITY: ICD-10-CM

## 2017-07-11 PROCEDURE — 25500020 PHARM REV CODE 255: Mod: PO | Performed by: UROLOGY

## 2017-07-11 PROCEDURE — 74178 CT ABD&PLV WO CNTR FLWD CNTR: CPT | Mod: TC,PO

## 2017-07-11 PROCEDURE — 74178 CT ABD&PLV WO CNTR FLWD CNTR: CPT | Mod: 26,,, | Performed by: RADIOLOGY

## 2017-07-11 RX ADMIN — IOHEXOL 30 ML: 350 INJECTION, SOLUTION INTRAVENOUS at 12:07

## 2017-07-11 RX ADMIN — IOHEXOL 75 ML: 350 INJECTION, SOLUTION INTRAVENOUS at 02:07

## 2017-07-21 DIAGNOSIS — R63.4 WEIGHT LOSS: ICD-10-CM

## 2017-07-21 RX ORDER — DONEPEZIL HYDROCHLORIDE 10 MG/1
10 TABLET, FILM COATED ORAL NIGHTLY
Qty: 30 TABLET | Refills: 9 | Status: SHIPPED | OUTPATIENT
Start: 2017-07-21 | End: 2017-10-19 | Stop reason: SDUPTHER

## 2017-07-21 RX ORDER — MEGESTROL ACETATE 40 MG/ML
800 SUSPENSION ORAL DAILY
Qty: 480 ML | Refills: 0 | Status: SHIPPED | OUTPATIENT
Start: 2017-07-21 | End: 2017-08-02 | Stop reason: SDUPTHER

## 2017-08-02 ENCOUNTER — OFFICE VISIT (OUTPATIENT)
Dept: FAMILY MEDICINE | Facility: CLINIC | Age: 62
End: 2017-08-02
Payer: MEDICAID

## 2017-08-02 ENCOUNTER — TELEPHONE (OUTPATIENT)
Dept: FAMILY MEDICINE | Facility: CLINIC | Age: 62
End: 2017-08-02

## 2017-08-02 VITALS
HEART RATE: 86 BPM | HEIGHT: 71 IN | TEMPERATURE: 97 F | BODY MASS INDEX: 22.99 KG/M2 | SYSTOLIC BLOOD PRESSURE: 140 MMHG | WEIGHT: 164.25 LBS | DIASTOLIC BLOOD PRESSURE: 90 MMHG | RESPIRATION RATE: 16 BRPM | OXYGEN SATURATION: 98 %

## 2017-08-02 DIAGNOSIS — E53.8 VITAMIN B12 DEFICIENCY: ICD-10-CM

## 2017-08-02 DIAGNOSIS — I10 HTN (HYPERTENSION), BENIGN: ICD-10-CM

## 2017-08-02 DIAGNOSIS — M54.16 SPINAL STENOSIS OF LUMBAR REGION WITH RADICULOPATHY: ICD-10-CM

## 2017-08-02 DIAGNOSIS — M48.061 SPINAL STENOSIS OF LUMBAR REGION WITH RADICULOPATHY: ICD-10-CM

## 2017-08-02 DIAGNOSIS — G89.4 CHRONIC PAIN SYNDROME: ICD-10-CM

## 2017-08-02 DIAGNOSIS — R63.4 WEIGHT LOSS: ICD-10-CM

## 2017-08-02 DIAGNOSIS — R11.0 NAUSEA: ICD-10-CM

## 2017-08-02 PROCEDURE — 99214 OFFICE O/P EST MOD 30 MIN: CPT | Mod: S$PBB,,, | Performed by: FAMILY MEDICINE

## 2017-08-02 PROCEDURE — 99999 PR PBB SHADOW E&M-EST. PATIENT-LVL IV: CPT | Mod: PBBFAC,,, | Performed by: FAMILY MEDICINE

## 2017-08-02 PROCEDURE — 3008F BODY MASS INDEX DOCD: CPT | Mod: ,,, | Performed by: FAMILY MEDICINE

## 2017-08-02 PROCEDURE — 99214 OFFICE O/P EST MOD 30 MIN: CPT | Mod: PBBFAC,PO | Performed by: FAMILY MEDICINE

## 2017-08-02 RX ORDER — PROMETHAZINE HYDROCHLORIDE 25 MG/1
25 TABLET ORAL 2 TIMES DAILY PRN
Qty: 60 TABLET | Refills: 3 | Status: SHIPPED | OUTPATIENT
Start: 2017-08-02 | End: 2017-10-19 | Stop reason: SDUPTHER

## 2017-08-02 RX ORDER — CLONIDINE HYDROCHLORIDE 0.3 MG/1
0.3 TABLET ORAL 3 TIMES DAILY
Qty: 90 TABLET | Refills: 5 | Status: SHIPPED | OUTPATIENT
Start: 2017-08-02 | End: 2017-10-19 | Stop reason: SDUPTHER

## 2017-08-02 RX ORDER — MEGESTROL ACETATE 40 MG/ML
800 SUSPENSION ORAL DAILY
Qty: 480 ML | Refills: 3 | Status: SHIPPED | OUTPATIENT
Start: 2017-08-02 | End: 2017-10-19 | Stop reason: SDUPTHER

## 2017-08-02 RX ORDER — AMLODIPINE AND BENAZEPRIL HYDROCHLORIDE 5; 20 MG/1; MG/1
2 CAPSULE ORAL DAILY
Qty: 60 CAPSULE | Refills: 5 | Status: SHIPPED | OUTPATIENT
Start: 2017-08-02 | End: 2017-10-19 | Stop reason: SDUPTHER

## 2017-08-02 RX ORDER — OXYCODONE HYDROCHLORIDE 30 MG/1
30 TABLET ORAL DAILY PRN
Qty: 15 TABLET | Refills: 0 | Status: SHIPPED | OUTPATIENT
Start: 2017-08-02 | End: 2017-08-11 | Stop reason: SDUPTHER

## 2017-08-02 RX ORDER — CYANOCOBALAMIN 1000 UG/ML
1000 INJECTION, SOLUTION INTRAMUSCULAR; SUBCUTANEOUS
Qty: 10 ML | Refills: 5 | Status: SHIPPED | OUTPATIENT
Start: 2017-08-02 | End: 2017-10-19 | Stop reason: SDUPTHER

## 2017-08-02 NOTE — TELEPHONE ENCOUNTER
----- Message from Denis May sent at 8/2/2017  9:31 AM CDT -----  Contact: Trisha Azar -[phone]   Would like to consult with the nurse about Rx medication and insurance billing, would like to make a change to medication for insurance billing purpose.  Please call back at [phone].  Thx-[facility]
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
## 2017-08-09 ENCOUNTER — TELEPHONE (OUTPATIENT)
Dept: FAMILY MEDICINE | Facility: CLINIC | Age: 62
End: 2017-08-09

## 2017-08-09 ENCOUNTER — HOSPITAL ENCOUNTER (EMERGENCY)
Facility: HOSPITAL | Age: 62
Discharge: HOME | End: 2017-08-09
Payer: MEDICAID

## 2017-08-09 VITALS
SYSTOLIC BLOOD PRESSURE: 173 MMHG | TEMPERATURE: 99 F | RESPIRATION RATE: 20 BRPM | DIASTOLIC BLOOD PRESSURE: 112 MMHG | OXYGEN SATURATION: 99 % | HEART RATE: 72 BPM

## 2017-08-09 DIAGNOSIS — M79.641 RIGHT HAND PAIN: ICD-10-CM
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
## 2017-08-09 DIAGNOSIS — I10 ESSENTIAL HYPERTENSION: ICD-10-CM

## 2017-08-09 DIAGNOSIS — S63.501A WRIST SPRAIN, RIGHT, INITIAL ENCOUNTER: ICD-10-CM

## 2017-08-09 DIAGNOSIS — M25.531 ACUTE PAIN OF RIGHT WRIST: Primary | ICD-10-CM

## 2017-08-09 PROCEDURE — 63600175 PHARM REV CODE 636 W HCPCS: Performed by: PHYSICIAN ASSISTANT

## 2017-08-09 PROCEDURE — 25000003 PHARM REV CODE 250: Performed by: PHYSICIAN ASSISTANT

## 2017-08-09 PROCEDURE — 96372 THER/PROPH/DIAG INJ SC/IM: CPT

## 2017-08-09 PROCEDURE — 99283 EMERGENCY DEPT VISIT LOW MDM: CPT | Mod: 25

## 2017-08-09 PROCEDURE — 29125 APPL SHORT ARM SPLINT STATIC: CPT | Mod: RT

## 2017-08-09 RX ORDER — MORPHINE SULFATE 4 MG/ML
4 INJECTION, SOLUTION INTRAMUSCULAR; INTRAVENOUS
Status: COMPLETED | OUTPATIENT
Start: 2017-08-09 | End: 2017-08-09

## 2017-08-09 RX ORDER — TRAMADOL HYDROCHLORIDE 50 MG/1
50 TABLET ORAL EVERY 6 HOURS PRN
Qty: 12 TABLET | Refills: 0 | Status: SHIPPED | OUTPATIENT
Start: 2017-08-09 | End: 2018-05-09

## 2017-08-09 RX ORDER — CLONIDINE HYDROCHLORIDE 0.3 MG/1
0.3 TABLET ORAL
Status: COMPLETED | OUTPATIENT
Start: 2017-08-09 | End: 2017-08-09

## 2017-08-09 RX ADMIN — CLONIDINE HYDROCHLORIDE 0.3 MG: 0.3 TABLET ORAL at 09:08

## 2017-08-09 RX ADMIN — MORPHINE SULFATE 4 MG: 4 INJECTION, SOLUTION INTRAMUSCULAR; INTRAVENOUS at 09:08

## 2017-08-09 NOTE — TELEPHONE ENCOUNTER
Pt at the ER   States he hurt his hand and is requesting a MRI ordered  ER told him that PCP has to order the MRI
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
## 2017-08-09 NOTE — TELEPHONE ENCOUNTER
Since the fall just happened I would continue with the recommendation from the ER   Rest and elevate his hand   If not better, contact me and I can order further imaging     Thanks

## 2017-08-09 NOTE — TELEPHONE ENCOUNTER
----- Message from Nuris Reyez sent at 8/9/2017  7:04 AM CDT -----  Contact: Patient  Patient feels like he broke his hand an needs to be seen today, but there are no openings with anyone, please call him back at [phone]. Thank you

## 2017-08-10 ENCOUNTER — TELEPHONE (OUTPATIENT)
Dept: FAMILY MEDICINE | Facility: CLINIC | Age: 62
End: 2017-08-10

## 2017-08-10 NOTE — TELEPHONE ENCOUNTER
----- Message from Danielle Key sent at 8/10/2017  1:10 PM CDT -----  Contact: Pt  Pt called and stated he needed to speak to the nurse. He stated he was seen in the ER yesterday (8/09/17) for a broken right hand and was told to follow up in two (2) days and is requesting to be squeezed into the schedule as soon as possible. He can be reached at [phone] ([phone])     Thanks,  TF

## 2017-08-11 ENCOUNTER — OFFICE VISIT (OUTPATIENT)
Dept: FAMILY MEDICINE | Facility: CLINIC | Age: 62
End: 2017-08-11
Payer: MEDICAID

## 2017-08-11 VITALS
RESPIRATION RATE: 16 BRPM | WEIGHT: 160.06 LBS | TEMPERATURE: 98 F | OXYGEN SATURATION: 97 % | SYSTOLIC BLOOD PRESSURE: 150 MMHG | HEIGHT: 71 IN | HEART RATE: 76 BPM | DIASTOLIC BLOOD PRESSURE: 110 MMHG | BODY MASS INDEX: 22.41 KG/M2

## 2017-08-11 DIAGNOSIS — M25.531 ACUTE PAIN OF RIGHT WRIST: Primary | ICD-10-CM

## 2017-08-11 PROCEDURE — 3077F SYST BP >= 140 MM HG: CPT | Mod: ,,, | Performed by: FAMILY MEDICINE

## 2017-08-11 PROCEDURE — 3008F BODY MASS INDEX DOCD: CPT | Mod: ,,, | Performed by: FAMILY MEDICINE

## 2017-08-11 PROCEDURE — 3080F DIAST BP >= 90 MM HG: CPT | Mod: ,,, | Performed by: FAMILY MEDICINE

## 2017-08-11 PROCEDURE — 99214 OFFICE O/P EST MOD 30 MIN: CPT | Mod: S$PBB,,, | Performed by: FAMILY MEDICINE

## 2017-08-11 PROCEDURE — 99214 OFFICE O/P EST MOD 30 MIN: CPT | Mod: PBBFAC,PO | Performed by: FAMILY MEDICINE

## 2017-08-11 PROCEDURE — 99999 PR PBB SHADOW E&M-EST. PATIENT-LVL IV: CPT | Mod: PBBFAC,,, | Performed by: FAMILY MEDICINE

## 2017-08-11 RX ORDER — OXYCODONE HYDROCHLORIDE 30 MG/1
30 TABLET ORAL DAILY PRN
Qty: 15 TABLET | Refills: 0 | Status: SHIPPED | OUTPATIENT
Start: 2017-08-11 | End: 2017-09-26 | Stop reason: SDUPTHER

## 2017-08-11 NOTE — PATIENT INSTRUCTIONS
Rest, elevate, ice the hand   If your hand is not better - then let me know and we can get MRI/ CT
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
## 2017-08-13 ENCOUNTER — HOSPITAL ENCOUNTER (EMERGENCY)
Facility: HOSPITAL | Age: 62
Discharge: HOME | End: 2017-08-13
Payer: MEDICAID

## 2017-08-13 VITALS
SYSTOLIC BLOOD PRESSURE: 201 MMHG | HEIGHT: 72 IN | OXYGEN SATURATION: 99 % | WEIGHT: 171 LBS | TEMPERATURE: 98 F | RESPIRATION RATE: 20 BRPM | DIASTOLIC BLOOD PRESSURE: 117 MMHG | BODY MASS INDEX: 23.16 KG/M2 | HEART RATE: 101 BPM

## 2017-08-13 DIAGNOSIS — M25.579 ANKLE PAIN: ICD-10-CM

## 2017-08-13 DIAGNOSIS — S93.402A SPRAIN OF LEFT ANKLE, UNSPECIFIED LIGAMENT, INITIAL ENCOUNTER: Primary | ICD-10-CM

## 2017-08-13 DIAGNOSIS — S93.602A FOOT SPRAIN, LEFT, INITIAL ENCOUNTER: ICD-10-CM

## 2017-08-13 PROCEDURE — 99283 EMERGENCY DEPT VISIT LOW MDM: CPT | Mod: 25

## 2017-08-13 PROCEDURE — 25000003 PHARM REV CODE 250: Performed by: PHYSICIAN ASSISTANT

## 2017-08-13 PROCEDURE — 96372 THER/PROPH/DIAG INJ SC/IM: CPT

## 2017-08-13 PROCEDURE — 63600175 PHARM REV CODE 636 W HCPCS: Performed by: PHYSICIAN ASSISTANT

## 2017-08-13 RX ORDER — HYDROMORPHONE HYDROCHLORIDE 2 MG/ML
1 INJECTION, SOLUTION INTRAMUSCULAR; INTRAVENOUS; SUBCUTANEOUS
Status: COMPLETED | OUTPATIENT
Start: 2017-08-13 | End: 2017-08-13

## 2017-08-13 RX ORDER — CLONIDINE HYDROCHLORIDE 0.2 MG/1
0.2 TABLET ORAL
Status: COMPLETED | OUTPATIENT
Start: 2017-08-13 | End: 2017-08-13

## 2017-08-13 RX ORDER — PROMETHAZINE HYDROCHLORIDE 25 MG/ML
25 INJECTION, SOLUTION INTRAMUSCULAR; INTRAVENOUS
Status: COMPLETED | OUTPATIENT
Start: 2017-08-13 | End: 2017-08-13

## 2017-08-13 RX ADMIN — CLONIDINE HYDROCHLORIDE 0.2 MG: 0.2 TABLET ORAL at 09:08

## 2017-08-13 RX ADMIN — PROMETHAZINE HYDROCHLORIDE 25 MG: 25 INJECTION INTRAMUSCULAR; INTRAVENOUS at 09:08

## 2017-08-13 RX ADMIN — HYDROMORPHONE HYDROCHLORIDE 1 MG: 2 INJECTION, SOLUTION INTRAMUSCULAR; INTRAVENOUS; SUBCUTANEOUS at 09:08
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
## 2017-09-26 ENCOUNTER — HOSPITAL ENCOUNTER (OUTPATIENT)
Dept: RADIOLOGY | Facility: HOSPITAL | Age: 62
Discharge: HOME | End: 2017-09-26
Attending: INTERNAL MEDICINE
Payer: MEDICAID

## 2017-09-26 ENCOUNTER — OFFICE VISIT (OUTPATIENT)
Dept: INTERNAL MEDICINE | Facility: CLINIC | Age: 62
End: 2017-09-26
Payer: MEDICAID

## 2017-09-26 VITALS
SYSTOLIC BLOOD PRESSURE: 140 MMHG | HEIGHT: 72 IN | DIASTOLIC BLOOD PRESSURE: 90 MMHG | TEMPERATURE: 97 F | WEIGHT: 155.88 LBS | HEART RATE: 95 BPM | BODY MASS INDEX: 21.11 KG/M2 | OXYGEN SATURATION: 97 %

## 2017-09-26 DIAGNOSIS — M79.606 PAIN OF LOWER EXTREMITY, UNSPECIFIED LATERALITY: ICD-10-CM

## 2017-09-26 DIAGNOSIS — I10 ESSENTIAL HYPERTENSION: ICD-10-CM

## 2017-09-26 DIAGNOSIS — M54.16 SPINAL STENOSIS OF LUMBAR REGION WITH RADICULOPATHY: Primary | ICD-10-CM

## 2017-09-26 DIAGNOSIS — G89.4 CHRONIC PAIN SYNDROME: ICD-10-CM

## 2017-09-26 DIAGNOSIS — M46.46 DISCITIS OF LUMBAR REGION: ICD-10-CM

## 2017-09-26 DIAGNOSIS — M48.061 SPINAL STENOSIS OF LUMBAR REGION WITH RADICULOPATHY: Primary | ICD-10-CM

## 2017-09-26 PROCEDURE — 90686 IIV4 VACC NO PRSV 0.5 ML IM: CPT | Mod: PBBFAC,PO

## 2017-09-26 PROCEDURE — 99999 PR PBB SHADOW E&M-EST. PATIENT-LVL IV: CPT | Mod: PBBFAC,,, | Performed by: INTERNAL MEDICINE

## 2017-09-26 PROCEDURE — 3077F SYST BP >= 140 MM HG: CPT | Mod: ,,, | Performed by: INTERNAL MEDICINE

## 2017-09-26 PROCEDURE — 73630 X-RAY EXAM OF FOOT: CPT | Mod: 26,LT,, | Performed by: RADIOLOGY

## 2017-09-26 PROCEDURE — 99213 OFFICE O/P EST LOW 20 MIN: CPT | Mod: S$PBB,,, | Performed by: INTERNAL MEDICINE

## 2017-09-26 PROCEDURE — 99214 OFFICE O/P EST MOD 30 MIN: CPT | Mod: PBBFAC,PO | Performed by: INTERNAL MEDICINE

## 2017-09-26 PROCEDURE — 3008F BODY MASS INDEX DOCD: CPT | Mod: ,,, | Performed by: INTERNAL MEDICINE

## 2017-09-26 PROCEDURE — 3080F DIAST BP >= 90 MM HG: CPT | Mod: ,,, | Performed by: INTERNAL MEDICINE

## 2017-09-26 PROCEDURE — 73630 X-RAY EXAM OF FOOT: CPT | Mod: TC,PO,LT

## 2017-09-26 RX ORDER — OXYCODONE HYDROCHLORIDE 30 MG/1
30 TABLET ORAL DAILY PRN
Qty: 15 TABLET | Refills: 0 | Status: SHIPPED | OUTPATIENT
Start: 2017-09-26 | End: 2017-10-19 | Stop reason: SDUPTHER

## 2017-09-26 NOTE — PROGRESS NOTES
Subjective:       Patient ID: Cooper Pope Jr. is a 61 y.o. male.    Chief Complaint: Hospital Follow Up    Pt unknown to me.  Here for left foot pain since a fall 6 weeks ago, swelling comes and goes.  Wants pain medication.  Also chronic back pain with left radiation, insurance did not cover injections with Dr. Holloway after 1/17 appt.      Review of Systems    Objective:      Physical Exam   Constitutional: He is oriented to person, place, and time. He appears well-developed and well-nourished.   HENT:   Mouth/Throat: Oropharynx is clear and moist.   Neck: Normal range of motion. Neck supple.   Cardiovascular: Normal rate, regular rhythm and intact distal pulses.  Exam reveals no gallop and no friction rub.    No murmur heard.  Pulmonary/Chest: Effort normal and breath sounds normal. He has no wheezes. He has no rales.   Abdominal: Soft. Bowel sounds are normal. He exhibits no mass. There is no tenderness.   Musculoskeletal: He exhibits no edema.        Left foot: There is decreased range of motion, tenderness and swelling.   Lymphadenopathy:     He has no cervical adenopathy.   Neurological: He is alert and oriented to person, place, and time.   Psychiatric: He has a normal mood and affect.       Assessment:       1. Spinal stenosis of lumbar region with radiculopathy    2. Acute pain of right wrist    3. Essential hypertension    4. Discitis of lumbar region    5. Chronic pain syndrome    6. Pain of lower extremity, unspecified laterality        Plan:       Cooper was seen today for hospital follow up.    Diagnoses and all orders for this visit:    Spinal stenosis of lumbar region with radiculopathy/ Discitis of lumbar region/ Chronic pain syndrome  -     oxycodone (ROXICODONE) 30 MG Tab; Take 1 tablet (30 mg total) by mouth daily as needed.    Essential hypertension- adeq control on rx.    Pain of lower extremity, unspecified laterality x 6 weeks- xray again- pain med for now.  -     X-Ray Foot Complete  Left; Future    Fluvax now.

## 2017-10-04 ENCOUNTER — OFFICE VISIT (OUTPATIENT)
Dept: PAIN MEDICINE | Facility: CLINIC | Age: 62
End: 2017-10-04
Payer: MEDICAID

## 2017-10-04 VITALS
RESPIRATION RATE: 16 BRPM | HEIGHT: 73 IN | DIASTOLIC BLOOD PRESSURE: 114 MMHG | WEIGHT: 155 LBS | SYSTOLIC BLOOD PRESSURE: 187 MMHG | BODY MASS INDEX: 20.54 KG/M2

## 2017-10-04 DIAGNOSIS — M47.816 LUMBAR SPONDYLOSIS: Primary | ICD-10-CM

## 2017-10-04 DIAGNOSIS — M54.16 LUMBAR RADICULOPATHY: ICD-10-CM

## 2017-10-04 PROCEDURE — 99214 OFFICE O/P EST MOD 30 MIN: CPT | Mod: S$PBB,,, | Performed by: ANESTHESIOLOGY

## 2017-10-04 PROCEDURE — 99212 OFFICE O/P EST SF 10 MIN: CPT | Mod: PBBFAC,PO | Performed by: ANESTHESIOLOGY

## 2017-10-04 PROCEDURE — 99999 PR PBB SHADOW E&M-EST. PATIENT-LVL II: CPT | Mod: PBBFAC,,, | Performed by: ANESTHESIOLOGY

## 2017-10-04 NOTE — LETTER
October 6, 2017      Maria De Jesus Barnard MD  [address] [place] [address]           [facility]  [address] [place] [address]  Phone: [phone]  Fax: [phone]          Patient: Cooper Pope Jr.   MR Number: [id number]   YOB: 1955   Date of Visit: 10/4/2017       Dear Dr. Maria De Jesus Barnard:    Thank you for referring Cooper Pope to me for evaluation. Attached you will find relevant portions of my assessment and plan of care.    If you have questions, please do not hesitate to call me. I look forward to following Cooper Pope along with you.    Sincerely,    Doyle Armstrong MD    Enclosure  CC:  No Recipients    If you would like to receive this communication electronically, please contact [email or web address] or [phone] to request more information on [facility] access.    For providers and/or their staff who would like to refer a patient to [facility], please contact us through our one-stop-shop provider referral line, [facility], at [phone].    If you feel you have received this communication in error or would no longer like to receive these types of communications, please e-mail [email or web address]

## 2017-10-04 NOTE — PROGRESS NOTES
Chief Pain Complaint:  Low-back Pain and Mid-back Pain        History of Present Illness:   Cooper Pope Jr. is a 61 y.o. male  who is presenting with a chief complaint of Low-back Pain and Mid-back Pain  . The patient began experiencing this problem insidiously, and the pain has been gradually worsening over the past 3 year(s). The pain is described as shooting, burning and electrical and is located in the left lumbar spine. Pain is intermittent and lasts hours. The pain radiates to  left leg. The patient rates his pain a 8 out of ten and interferes with activities of daily living a 7 out of ten. Pain is exacerbated by ambulation, and is improved by rest, Oxycodone. Patient reports no prior trauma, prior lumbar surgery     - pertinent negatives: No fever, No chills, No weight loss, No bladder dysfunction, No bowel dysfunction,  - pertinent positives: generalized nonspecific Lower Extremity weakness bilaterally  L>R  - medications, other therapies tried (physical therapy, injections):     >> NSAIDs, Tylenol and oxycodone    >> Has previously undergone Physical Therapy    >> Has previously undergone spinal injection/s      Imaging / Labs / Studies (reviewed on 10/4/2017):    Results for orders placed during the hospital encounter of 05/15/17   MRI Lumbar Spine Without Contrast    Narrative MRI LUMBAR SPINE    TECHNIQUE: MRI lumbar spine was performed without contrast. The following sequences were obtained: Localizer; sagittal T1, T2, STIR; axial T1 and T2.    COMPARISON: 2/20/17    FINDINGS:    There are 5 lumbar vertebrae.  Status post posterior instrumented fusion of L3-S1 along with bilateral sacral ala screws.  Disc spacers are noted at L4-L5 and L5-S1.  Vertebral body heights and alignment are maintained.  Metallic artifact limits evaluation.  Visualized bone marrow signal is maintained.   Conus terminates at L2 and appears unremarkable. Limited evaluation of posterior abdominal structures is unremarkable.   Paraspinal musculature is within normal limits.  Evaluation of sacroiliac joints is unremarkable.    L1-L2: No spinal canal stenosis or neuroforaminal narrowing.    L2-L3: Circumferential disc bulge results in mild spinal canal stenosis.    L3-L4: Circumferential disc bulge results in mild spinal canal stenosis.    L4-L5: Status post posterior decompression. No spinal canal stenosis or neuroforaminal narrowing.    L5-S1: Status post posterior decompression. No spinal canal stenosis or neuroforaminal narrowing.    Impression  Status post lumbosacral fusion.  Mild degenerative changes as above.      Electronically signed by: GABBY DELANEY MD  Date:     05/15/17  Time:    15:13        Results for orders placed during the hospital encounter of 02/20/17   CT Lumbar Spine Without Contrast    Narrative CT lumbar spine    02/20/17 10:50:00    Accession# [id number]    CLINICAL INDICATION: 61 year old M with spondylosis, prior surgery.    TECHNIQUE:     Axial CT images obtained throughout the region of the lumbar spine without intravenous contrast. Axial, sagittal, and coronal reconstructions were performed.    COMPARISON: MRI 01/11/2016    FINDINGS:     There is a transitional sacral sacral segment with partial lumbarization of the S1 vertebral body and a well-formed but small disc at the S1-S2 level. Nomenclature this report consists with prior exam.    Since the prior examination the patient has undergone L4-5 laminectomy with instrumented posterior spinal fusion. Interbody disc space is present at L4-5 and L5-S1 with associated endplate irregularity and sclerosis. No apparent bony fusion of these vertebral bodies at this point in time. There are bilateral pedicle screws and posterior fusion rods spanning L3 through the sacrum. The orthopedic hardware appears intact. There is lucency surrounding the L3 screws bilaterally as well as the large right sacral screw. Findings favor mechanical loosening or infection. Hardware  appears in reasonable position at this time.    The vertebral bodies are stable in height and morphology without evidence of new fracture.  Normal sagittal alignment is preserved.    Spinal canal is narrowed on a developmental basis. It has been at least partially deviation of spinal stenosis at the operative level and L4-5 laminectomy. Probable residual mild spinal canal narrowing at L3-4.      Limited evaluation of the intraspinal contents demonstrates no hematoma or mass.Paraspinal soft tissues exhibit no acute abnormalities.    Impression Post surgical changes prior L4-5 laminectomy with instrumented posterior spinal fusion spanning L3 through the sacrum. There is lucency surrounding the bilateral L3 screws and the right sacral screw, favoring mechanical loosening over infection.    Interval decompression of the spinal canal at L4-5 and L5-S1.    Development narrowing of the spinal canal.    No new large disc herniation or site of spinal stenosis with the limitations of CT.    Please note the patient has a transitional type lumbosacral segment with nomenclature as above.         Electronically signed by: OPAL YOST MD  Date:     02/20/17  Time:    11:35        Results for orders placed during the hospital encounter of 04/11/16   X-Ray Lumbar Spine Complete 5 View    Narrative Lumbar spine series, 5 views.    Clinical indication: Back pain.    Postop changes noted about the abdomen/pelvis.  There is moderate interspace narrowing at L5-S1 consistent with degenerative disk disease.  Mild narrowing at L4 -- L5.  Arthritic lipping noted at L4 -- L5 and L5 -- S1.  No acute lumbar fracture or significant subluxation.    Impression  Multilevel degenerative changes, greatest at L5 -- S1 and to a lesser extent L4 -- L5.  No acute lumbar fracture or significant subluxation.      Electronically signed by: LEO WHITTEN MD  Date:     04/11/16  Time:    21:19        Results for orders placed during the hospital  "encounter of 05/01/17   X-Ray Lumbar Spine AP And Lateral    Narrative Lumbar spine three views.    Findings: There are postoperative changes of posterior lumbosacral fusion and laminectomy.  No hardware failure or loosening evident.  No vertebral compression fracture or subluxation.    Impression  As above.      Electronically signed by: LEO RAMOS MD  Date:     05/01/17  Time:    14:48            Review of Systems:  CONSTITUTIONAL: patient denies any fever, chills, or weight loss  SKIN: patient denies any rash or itching  RESPIRATORY: patient denies having any shortness of breath  GASTROINTESTINAL: patient denies having any diarrhea, constipation, or bowel incontinence  GENITOURINARY: patient denies having any abnormal bladder function    MUSCULOSKELETAL:  - patient complains of the above noted pain/s (see chief pain complaint)    NEUROLOGICAL:   - pain as above  - strength in Lower extremities is decreased, BILATERALLY  - sensation in Lower extremities is intact, BILATERALLY  - patient denies any loss of bowel or bladder control      PSYCHIATRIC: patient denies any change in mood    Other:  All other systems reviewed and are negative      Physical Exam:  BP (!) 187/114 (BP Location: Right arm, Patient Position: Sitting)   Resp 16   Ht 6' 1" (1.854 m)   Wt 70.3 kg (155 lb)   BMI 20.45 kg/m²  (reviewed on 10/4/2017)  General: Alert and oriented, in no apparent distress.  Gait: normal gait.  Skin: No rashes, No discoloration, No obvious lesions  HEENT: Normocephalic, atraumatic. Pupils equal and round.  Cardiovascular: Regular rate and rhythm , no significant peripheral edema present  Respiratory: Without audible wheezing, without use of accessory muscles of respiration.    Musculoskeletal:      Lumbar Spine    - Pain on flexion of lumbar spine Present  - Straight Leg Raise:  Present L>R    - Pain on extension of lumbar spine Present  - TTP over the lumbar facet joints Absent  - Lumbar facet loading " Absent    -Pain on palpation over the SI joint  Absent  - SEAN: Absent      Neuro:    Strength:    LE R/L: HF: 5/4, HE: 5/4, KF: 5/4; KE: 5/4; FE: 5/4; FF: 5/4    Extremity Reflexes: Brisk and symmetric throughout.      Extremity Sensory: Sensation to pinprick and temperature symmetric. Proprioception intact.      Psych:  Mood and affect is appropriate      Assessment:    Cooper Pope Jr. is a 61 y.o. year old male who is presenting with     Encounter Diagnoses   Name Primary?    Lumbar spondylosis Yes    Lumbar radiculopathy        Plan:    1. Interventional: Patient is not interested at this time.     2. Pharmacologic: Patient is taking Oxycodone 30 mg PO Q day PRN. Patient was encouraged to wean down from current pain mediation. Information regarding [facility]ox was given to patient.     3. Rehabilitative: Encouraged PT    4. Diagnostic: None at this time.     5. Follow up: Return if symptoms worsen or fail to improve.      45 minutes were spent in this encounter with more than 50% of the time used for counseling and review of the plan.  Imaging / studies reviewed, detailed above.  I discussed in detail the risks, benefits, and alternatives to any and all potential treatment options.  All questions and concerns were fully addressed today in clinic. Medical decision making moderate.    Thank you for the opportunity to assist in the care of this patient.    Best wishes,    Signed:    Doyle Armstrong MD

## 2017-10-13 ENCOUNTER — TELEPHONE (OUTPATIENT)
Dept: ADMINISTRATIVE | Facility: HOSPITAL | Age: 62
End: 2017-10-13

## 2017-10-15 DIAGNOSIS — M48.00 SPINAL STENOSIS, UNSPECIFIED SPINAL REGION: Primary | ICD-10-CM

## 2017-10-17 ENCOUNTER — TELEPHONE (OUTPATIENT)
Dept: FAMILY MEDICINE | Facility: CLINIC | Age: 62
End: 2017-10-17

## 2017-10-17 NOTE — TELEPHONE ENCOUNTER
----- Message from Brenton Novak sent at 10/17/2017  2:03 PM CDT -----  Contact: self [phone]  Would like to consult with nurse regarding being worked in for appt for 10/19 or 10/20.  Please call back at [phone]

## 2017-10-18 ENCOUNTER — TELEPHONE (OUTPATIENT)
Dept: FAMILY MEDICINE | Facility: CLINIC | Age: 62
End: 2017-10-18

## 2017-10-18 NOTE — TELEPHONE ENCOUNTER
----- Message from Grace Ashford sent at 10/18/2017 12:21 PM CDT -----  Pt at [phone]//states he has appts in [place](not with [facility])he is needing to give you the okay to send a copy of his MRI of his back and x-ray of his foot//he will be seeing Dr Mayank Cheney//[facility]//phone is [phone] and fax is [phone]//please call to discuss///thanks/[facility]

## 2017-10-19 ENCOUNTER — OFFICE VISIT (OUTPATIENT)
Dept: FAMILY MEDICINE | Facility: CLINIC | Age: 62
End: 2017-10-19
Payer: MEDICAID

## 2017-10-19 VITALS
BODY MASS INDEX: 22.98 KG/M2 | OXYGEN SATURATION: 97 % | HEART RATE: 80 BPM | HEIGHT: 69 IN | TEMPERATURE: 97 F | WEIGHT: 155.19 LBS | RESPIRATION RATE: 16 BRPM

## 2017-10-19 DIAGNOSIS — E53.8 VITAMIN B12 DEFICIENCY: ICD-10-CM

## 2017-10-19 DIAGNOSIS — M48.061 SPINAL STENOSIS OF LUMBAR REGION WITH RADICULOPATHY: ICD-10-CM

## 2017-10-19 DIAGNOSIS — I10 HTN (HYPERTENSION), BENIGN: ICD-10-CM

## 2017-10-19 DIAGNOSIS — B35.1 ONYCHOMYCOSIS: Primary | ICD-10-CM

## 2017-10-19 DIAGNOSIS — M54.16 SPINAL STENOSIS OF LUMBAR REGION WITH RADICULOPATHY: ICD-10-CM

## 2017-10-19 DIAGNOSIS — R60.9 EDEMA, UNSPECIFIED TYPE: ICD-10-CM

## 2017-10-19 DIAGNOSIS — R11.0 NAUSEA: ICD-10-CM

## 2017-10-19 DIAGNOSIS — R63.4 WEIGHT LOSS: ICD-10-CM

## 2017-10-19 PROCEDURE — 99999 PR PBB SHADOW E&M-EST. PATIENT-LVL IV: CPT | Mod: PBBFAC,,, | Performed by: FAMILY MEDICINE

## 2017-10-19 PROCEDURE — 99214 OFFICE O/P EST MOD 30 MIN: CPT | Mod: PBBFAC,PO | Performed by: FAMILY MEDICINE

## 2017-10-19 PROCEDURE — 99214 OFFICE O/P EST MOD 30 MIN: CPT | Mod: S$PBB,,, | Performed by: FAMILY MEDICINE

## 2017-10-19 RX ORDER — AMLODIPINE AND BENAZEPRIL HYDROCHLORIDE 5; 20 MG/1; MG/1
2 CAPSULE ORAL DAILY
Qty: 60 CAPSULE | Refills: 5 | Status: SHIPPED | OUTPATIENT
Start: 2017-10-19 | End: 2018-03-15 | Stop reason: SDUPTHER

## 2017-10-19 RX ORDER — MEGESTROL ACETATE 40 MG/ML
800 SUSPENSION ORAL DAILY
Qty: 480 ML | Refills: 3 | Status: SHIPPED | OUTPATIENT
Start: 2017-10-19 | End: 2019-02-28 | Stop reason: SDUPTHER

## 2017-10-19 RX ORDER — PROMETHAZINE HYDROCHLORIDE 25 MG/1
25 TABLET ORAL 2 TIMES DAILY PRN
Qty: 60 TABLET | Refills: 3 | Status: SHIPPED | OUTPATIENT
Start: 2017-10-19 | End: 2018-03-15 | Stop reason: SDUPTHER

## 2017-10-19 RX ORDER — CYANOCOBALAMIN 1000 UG/ML
1000 INJECTION, SOLUTION INTRAMUSCULAR; SUBCUTANEOUS
Qty: 10 ML | Refills: 5 | Status: SHIPPED | OUTPATIENT
Start: 2017-10-19 | End: 2019-03-29 | Stop reason: SDUPTHER

## 2017-10-19 RX ORDER — OXYCODONE HYDROCHLORIDE 30 MG/1
30 TABLET ORAL DAILY PRN
Qty: 15 TABLET | Refills: 0 | Status: SHIPPED | OUTPATIENT
Start: 2017-10-19 | End: 2018-05-09

## 2017-10-19 RX ORDER — CLONIDINE HYDROCHLORIDE 0.3 MG/1
0.3 TABLET ORAL 3 TIMES DAILY
Qty: 90 TABLET | Refills: 5 | Status: SHIPPED | OUTPATIENT
Start: 2017-10-19 | End: 2018-03-15 | Stop reason: SDUPTHER

## 2017-10-19 RX ORDER — DONEPEZIL HYDROCHLORIDE 10 MG/1
10 TABLET, FILM COATED ORAL NIGHTLY
Qty: 30 TABLET | Refills: 5 | Status: SHIPPED | OUTPATIENT
Start: 2017-10-19 | End: 2019-03-29 | Stop reason: SDUPTHER

## 2017-10-19 RX ORDER — FUROSEMIDE 40 MG/1
40 TABLET ORAL DAILY
Qty: 30 TABLET | Refills: 4 | Status: SHIPPED | OUTPATIENT
Start: 2017-10-19 | End: 2018-05-10 | Stop reason: ALTCHOICE

## 2017-10-19 RX ORDER — METOPROLOL SUCCINATE 25 MG/1
25 TABLET, EXTENDED RELEASE ORAL DAILY
Qty: 30 TABLET | Refills: 5 | Status: SHIPPED | OUTPATIENT
Start: 2017-10-19 | End: 2018-03-15 | Stop reason: SDUPTHER
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
## 2017-10-23 ENCOUNTER — TELEPHONE (OUTPATIENT)
Dept: PODIATRY | Facility: CLINIC | Age: 62
End: 2017-10-23

## 2017-10-23 NOTE — TELEPHONE ENCOUNTER
"Mr. Cooper Pope was given a call informing him of his scheduled appointment for 4 pm on 11/30/2017 at the [facility] 2nd floor. Mr. Pope replied, "the 30th of next month." He was informed yes, due to his insurance that was the next available appointment for him. He then stated "make it a lil earlier" when informed of his appointment time again, and he was informed that unfortunately that was the only time available. Mr. Pope verbalized understanding, and the call ended well.          FYI: On Friday 10/20/2017 the office of Dr. Leigha Diallo contacted our office asking if Mr. Cooper Pope can be seen for swollen feet and onychomycosis. They were informed that the patient would be contacted once the appointment is scheduled. Verbal understanding was given, and the call ended well.  "

## 2017-11-03 ENCOUNTER — TELEPHONE (OUTPATIENT)
Dept: NEPHROLOGY | Facility: CLINIC | Age: 62
End: 2017-11-03

## 2017-11-03 NOTE — TELEPHONE ENCOUNTER
Returned call to pt.'s wife, advised that pt. Has been scheduled for next available and added to waitlist. She verbalized understanding.

## 2017-11-03 NOTE — TELEPHONE ENCOUNTER
----- Message from Sosa Diallo sent at 11/3/2017  7:50 AM CDT -----  Please call pt wife back at [phone]. Pt missed appt on yesterday and need to see about getting work in for one sooner than January.

## 2017-12-31 ENCOUNTER — HOSPITAL ENCOUNTER (OUTPATIENT)
Facility: HOSPITAL | Age: 62
Discharge: HOME | End: 2017-12-31
Attending: EMERGENCY MEDICINE | Admitting: INTERNAL MEDICINE
Payer: MEDICAID

## 2017-12-31 VITALS
HEART RATE: 64 BPM | WEIGHT: 163.25 LBS | BODY MASS INDEX: 24.18 KG/M2 | RESPIRATION RATE: 20 BRPM | DIASTOLIC BLOOD PRESSURE: 92 MMHG | HEIGHT: 69 IN | OXYGEN SATURATION: 100 % | SYSTOLIC BLOOD PRESSURE: 149 MMHG | TEMPERATURE: 98 F

## 2017-12-31 DIAGNOSIS — R27.0 ATAXIA: ICD-10-CM

## 2017-12-31 DIAGNOSIS — I63.9 CVA (CEREBRAL VASCULAR ACCIDENT): ICD-10-CM

## 2017-12-31 DIAGNOSIS — R51.9 ACUTE NONINTRACTABLE HEADACHE, UNSPECIFIED HEADACHE TYPE: Primary | ICD-10-CM

## 2017-12-31 DIAGNOSIS — I10 ESSENTIAL HYPERTENSION: ICD-10-CM

## 2017-12-31 DIAGNOSIS — F03.90 DEMENTIA WITHOUT BEHAVIORAL DISTURBANCE, UNSPECIFIED DEMENTIA TYPE: ICD-10-CM

## 2017-12-31 PROBLEM — E87.5 HYPERKALEMIA: Status: ACTIVE | Noted: 2017-12-31

## 2017-12-31 LAB
ALBUMIN SERPL BCP-MCNC: 3.3 G/DL
ALBUMIN SERPL BCP-MCNC: 3.4 G/DL
ALP SERPL-CCNC: 84 U/L
ALP SERPL-CCNC: 94 U/L
ALT SERPL W/O P-5'-P-CCNC: 7 U/L
ALT SERPL W/O P-5'-P-CCNC: <5 U/L
ANION GAP SERPL CALC-SCNC: 8 MMOL/L
ANION GAP SERPL CALC-SCNC: 9 MMOL/L
AORTIC VALVE REGURGITATION: ABNORMAL
AST SERPL-CCNC: 13 U/L
AST SERPL-CCNC: 40 U/L
BACTERIA #/AREA URNS HPF: ABNORMAL /HPF
BASOPHILS # BLD AUTO: 0.02 K/UL
BASOPHILS NFR BLD: 0.3 %
BILIRUB SERPL-MCNC: 0.6 MG/DL
BILIRUB SERPL-MCNC: 1 MG/DL
BILIRUB UR QL STRIP: ABNORMAL
BNP SERPL-MCNC: 70 PG/ML
BUN SERPL-MCNC: 12 MG/DL
BUN SERPL-MCNC: 14 MG/DL
CALCIUM SERPL-MCNC: 9.1 MG/DL
CALCIUM SERPL-MCNC: 9.2 MG/DL
CHLORIDE SERPL-SCNC: 107 MMOL/L
CHLORIDE SERPL-SCNC: 108 MMOL/L
CK SERPL-CCNC: 75 U/L
CLARITY UR: ABNORMAL
CO2 SERPL-SCNC: 22 MMOL/L
CO2 SERPL-SCNC: 24 MMOL/L
COLOR UR: YELLOW
CREAT SERPL-MCNC: 1.1 MG/DL
CREAT SERPL-MCNC: 1.1 MG/DL
DIASTOLIC DYSFUNCTION: YES
DIFFERENTIAL METHOD: ABNORMAL
EOSINOPHIL # BLD AUTO: 0.2 K/UL
EOSINOPHIL NFR BLD: 2.9 %
ERYTHROCYTE [DISTWIDTH] IN BLOOD BY AUTOMATED COUNT: 13.7 %
EST. GFR  (AFRICAN AMERICAN): >60 ML/MIN/1.73 M^2
EST. GFR  (AFRICAN AMERICAN): >60 ML/MIN/1.73 M^2
EST. GFR  (NON AFRICAN AMERICAN): >60 ML/MIN/1.73 M^2
EST. GFR  (NON AFRICAN AMERICAN): >60 ML/MIN/1.73 M^2
ESTIMATED PA SYSTOLIC PRESSURE: 38.44
FLUAV AG SPEC QL IA: NEGATIVE
FLUBV AG SPEC QL IA: NEGATIVE
GLUCOSE SERPL-MCNC: 100 MG/DL
GLUCOSE SERPL-MCNC: 91 MG/DL
GLUCOSE UR QL STRIP: NEGATIVE
HCT VFR BLD AUTO: 29.2 %
HGB BLD-MCNC: 9.8 G/DL
HGB UR QL STRIP: NEGATIVE
KETONES UR QL STRIP: NEGATIVE
LEUKOCYTE ESTERASE UR QL STRIP: NEGATIVE
LYMPHOCYTES # BLD AUTO: 1.6 K/UL
LYMPHOCYTES NFR BLD: 27.5 %
MCH RBC QN AUTO: 34.1 PG
MCHC RBC AUTO-ENTMCNC: 33.6 G/DL
MCV RBC AUTO: 102 FL
MICROSCOPIC COMMENT: ABNORMAL
MITRAL VALVE REGURGITATION: ABNORMAL
MONOCYTES # BLD AUTO: 0.9 K/UL
MONOCYTES NFR BLD: 14.7 %
NEUTROPHILS # BLD AUTO: 3.2 K/UL
NEUTROPHILS NFR BLD: 54.6 %
NITRITE UR QL STRIP: POSITIVE
PH UR STRIP: 6 [PH] (ref 5–8)
PLATELET # BLD AUTO: 223 K/UL
PMV BLD AUTO: 8.4 FL
POTASSIUM SERPL-SCNC: 4.6 MMOL/L
POTASSIUM SERPL-SCNC: 4.6 MMOL/L
POTASSIUM SERPL-SCNC: 5.6 MMOL/L
PROT SERPL-MCNC: 7 G/DL
PROT SERPL-MCNC: 7.9 G/DL
PROT UR QL STRIP: NEGATIVE
RBC # BLD AUTO: 2.87 M/UL
RBC #/AREA URNS HPF: 2 /HPF (ref 0–4)
RETIRED EF AND QEF - SEE NOTES: 50 (ref 55–65)
SODIUM SERPL-SCNC: 138 MMOL/L
SODIUM SERPL-SCNC: 140 MMOL/L
SP GR UR STRIP: >=1.03 (ref 1–1.03)
SPECIMEN SOURCE: NORMAL
TRICUSPID VALVE REGURGITATION: ABNORMAL
TROPONIN I SERPL DL<=0.01 NG/ML-MCNC: <0.006 NG/ML
URN SPEC COLLECT METH UR: ABNORMAL
UROBILINOGEN UR STRIP-ACNC: NEGATIVE EU/DL
WBC # BLD AUTO: 5.86 K/UL
WBC #/AREA URNS HPF: 1 /HPF (ref 0–5)

## 2017-12-31 PROCEDURE — 25000003 PHARM REV CODE 250: Performed by: EMERGENCY MEDICINE

## 2017-12-31 PROCEDURE — 99285 EMERGENCY DEPT VISIT HI MDM: CPT | Mod: ,,, | Performed by: PSYCHIATRY & NEUROLOGY

## 2017-12-31 PROCEDURE — 93306 TTE W/DOPPLER COMPLETE: CPT | Mod: 26,,, | Performed by: INTERNAL MEDICINE

## 2017-12-31 PROCEDURE — A9585 GADOBUTROL INJECTION: HCPCS | Performed by: EMERGENCY MEDICINE

## 2017-12-31 PROCEDURE — 93010 ELECTROCARDIOGRAM REPORT: CPT | Mod: ,,, | Performed by: INTERNAL MEDICINE

## 2017-12-31 PROCEDURE — 99285 EMERGENCY DEPT VISIT HI MDM: CPT | Mod: 25

## 2017-12-31 PROCEDURE — 84484 ASSAY OF TROPONIN QUANT: CPT

## 2017-12-31 PROCEDURE — 63600175 PHARM REV CODE 636 W HCPCS: Performed by: EMERGENCY MEDICINE

## 2017-12-31 PROCEDURE — 85025 COMPLETE CBC W/AUTO DIFF WBC: CPT

## 2017-12-31 PROCEDURE — 80053 COMPREHEN METABOLIC PANEL: CPT

## 2017-12-31 PROCEDURE — 80053 COMPREHEN METABOLIC PANEL: CPT | Mod: 91

## 2017-12-31 PROCEDURE — G8978 MOBILITY CURRENT STATUS: HCPCS | Mod: CK

## 2017-12-31 PROCEDURE — 83880 ASSAY OF NATRIURETIC PEPTIDE: CPT

## 2017-12-31 PROCEDURE — 81000 URINALYSIS NONAUTO W/SCOPE: CPT

## 2017-12-31 PROCEDURE — G0378 HOSPITAL OBSERVATION PER HR: HCPCS

## 2017-12-31 PROCEDURE — 97162 PT EVAL MOD COMPLEX 30 MIN: CPT

## 2017-12-31 PROCEDURE — 93306 TTE W/DOPPLER COMPLETE: CPT

## 2017-12-31 PROCEDURE — 25000003 PHARM REV CODE 250: Performed by: PHYSICIAN ASSISTANT

## 2017-12-31 PROCEDURE — G8979 MOBILITY GOAL STATUS: HCPCS | Mod: CJ

## 2017-12-31 PROCEDURE — 93005 ELECTROCARDIOGRAM TRACING: CPT

## 2017-12-31 PROCEDURE — 82550 ASSAY OF CK (CPK): CPT

## 2017-12-31 PROCEDURE — 25500020 PHARM REV CODE 255: Performed by: EMERGENCY MEDICINE

## 2017-12-31 PROCEDURE — 80061 LIPID PANEL: CPT

## 2017-12-31 PROCEDURE — 96374 THER/PROPH/DIAG INJ IV PUSH: CPT | Mod: 59

## 2017-12-31 PROCEDURE — 87400 INFLUENZA A/B EACH AG IA: CPT | Mod: 59

## 2017-12-31 RX ORDER — GADOBUTROL 604.72 MG/ML
7 INJECTION INTRAVENOUS
Status: COMPLETED | OUTPATIENT
Start: 2017-12-31 | End: 2017-12-31

## 2017-12-31 RX ORDER — TAMSULOSIN HYDROCHLORIDE 0.4 MG/1
0.4 CAPSULE ORAL DAILY
Qty: 30 CAPSULE | Refills: 0 | Status: SHIPPED | OUTPATIENT
Start: 2017-12-31 | End: 2017-12-31

## 2017-12-31 RX ORDER — TAMSULOSIN HYDROCHLORIDE 0.4 MG/1
0.4 CAPSULE ORAL DAILY
Qty: 30 CAPSULE | Refills: 0 | Status: SHIPPED | OUTPATIENT
Start: 2017-12-31 | End: 2019-03-29 | Stop reason: SDUPTHER

## 2017-12-31 RX ORDER — ATORVASTATIN CALCIUM 10 MG/1
10 TABLET, FILM COATED ORAL NIGHTLY
Status: DISCONTINUED | OUTPATIENT
Start: 2017-12-31 | End: 2017-12-31 | Stop reason: HOSPADM

## 2017-12-31 RX ORDER — TRAMADOL HYDROCHLORIDE 50 MG/1
50 TABLET ORAL EVERY 12 HOURS PRN
Status: DISCONTINUED | OUTPATIENT
Start: 2017-12-31 | End: 2017-12-31 | Stop reason: HOSPADM

## 2017-12-31 RX ORDER — ONDANSETRON 4 MG/1
4 TABLET, ORALLY DISINTEGRATING ORAL
Status: COMPLETED | OUTPATIENT
Start: 2017-12-31 | End: 2017-12-31

## 2017-12-31 RX ORDER — CIPROFLOXACIN 500 MG/1
500 TABLET ORAL EVERY 12 HOURS
Qty: 14 TABLET | Refills: 0 | Status: SHIPPED | OUTPATIENT
Start: 2017-12-31 | End: 2018-01-07

## 2017-12-31 RX ORDER — DONEPEZIL HYDROCHLORIDE 5 MG/1
10 TABLET, FILM COATED ORAL NIGHTLY
Status: DISCONTINUED | OUTPATIENT
Start: 2017-12-31 | End: 2017-12-31 | Stop reason: HOSPADM

## 2017-12-31 RX ORDER — MORPHINE SULFATE 4 MG/ML
4 INJECTION, SOLUTION INTRAMUSCULAR; INTRAVENOUS
Status: DISCONTINUED | OUTPATIENT
Start: 2017-12-31 | End: 2017-12-31

## 2017-12-31 RX ORDER — CIPROFLOXACIN 500 MG/1
500 TABLET ORAL EVERY 12 HOURS
Status: DISCONTINUED | OUTPATIENT
Start: 2017-12-31 | End: 2017-12-31 | Stop reason: HOSPADM

## 2017-12-31 RX ORDER — ASPIRIN 81 MG/1
81 TABLET ORAL DAILY
Status: DISCONTINUED | OUTPATIENT
Start: 2017-12-31 | End: 2017-12-31

## 2017-12-31 RX ORDER — ENOXAPARIN SODIUM 100 MG/ML
40 INJECTION SUBCUTANEOUS EVERY 24 HOURS
Status: DISCONTINUED | OUTPATIENT
Start: 2017-12-31 | End: 2017-12-31 | Stop reason: HOSPADM

## 2017-12-31 RX ORDER — ASPIRIN 81 MG/1
81 TABLET ORAL DAILY
Status: DISCONTINUED | OUTPATIENT
Start: 2017-12-31 | End: 2017-12-31 | Stop reason: HOSPADM

## 2017-12-31 RX ORDER — KETOROLAC TROMETHAMINE 30 MG/ML
15 INJECTION, SOLUTION INTRAMUSCULAR; INTRAVENOUS
Status: COMPLETED | OUTPATIENT
Start: 2017-12-31 | End: 2017-12-31

## 2017-12-31 RX ORDER — ASPIRIN 81 MG/1
81 TABLET ORAL DAILY
Qty: 30 TABLET | Refills: 0 | Status: SHIPPED | OUTPATIENT
Start: 2018-01-01 | End: 2019-03-29 | Stop reason: SDUPTHER

## 2017-12-31 RX ADMIN — GADOBUTROL 7 ML: 604.72 INJECTION INTRAVENOUS at 12:12

## 2017-12-31 RX ADMIN — KETOROLAC TROMETHAMINE 15 MG: 30 INJECTION, SOLUTION INTRAMUSCULAR at 10:12

## 2017-12-31 RX ADMIN — ASPIRIN 81 MG: 81 TABLET, COATED ORAL at 10:12

## 2017-12-31 RX ADMIN — ONDANSETRON 4 MG: 4 TABLET, ORALLY DISINTEGRATING ORAL at 10:12

## 2017-12-31 NOTE — DISCHARGE SUMMARY
[facility] Hospital Medicine  Discharge Summary      Patient Name: Cooper Pope Jr.  MRN: [id number]  Admission Date: 12/31/2017  Hospital Length of Stay: 0 days  Discharge Date and Time:  12/31/2017 4:26 PM  Attending Physician: Dayo Marr MD   Discharging Provider: MALINA Balbuena  Primary Care Provider: Leihga Diallo MD      HPI:   Cooper Pope is a 62 year old male with dementia and previous CVA who presented to the Emergency Room overnight with reports of headache, dizziness and weakness making ambulation difficult. He reports laying in bed to go to sleep and feeling as if he were having another stroke. He notes feeling generally warm and not like himself. The patient's headache is frontal and described as severe. There is associated sensitivity to light. The patient describes his dizziness as the room spinning. He does not report any weakness. However, on exam he has focal right sided weakness. The patient's wife reports that his deficits following one of his previous CVAs included right sided weakness. However, she reports that he recovered fully from this and usually walks their dog daily. He denies any fever, chills, dysphagia, bladder and bowel incontinence as well as chest pain. In the ED, work up showed an elevated potassium of 5.6 with a repeat pending and a CT scan of the head that is negative for acute findings. Code status was discussed with the patient. He is a full code. His wife is his surrogate medical decision maker.     * No surgery found *      Hospital Course:   The patient was admitted for evaluation of ataxia, right sided weakness, headache and dizziness. He was also found to have an elevated potassium and UTI. CT scan of the head, MRI of the head, carotid US and 2D echo were negative for acute findings. Neurology elevated the patient and felt that his symptoms were due to TIA as they gradually resolved during his hospitalization. The patient  did not report taking an ASA daily. He and his wife denied history of bleeding and were unsure as to why he was not taking one. He was instructed to restart a daily ASA regimen. Prior to discharge, the patient complained of symptoms of BPH. He was started on Flomax and asked to follow up with his Urologist, Dr. Parekh. Repeat labs showed a normal potassium. The initial potassium level was likely a lab error.      Consults:   Consults         Status Ordering Provider     Inpatient consult to Neurology  Once     Provider:  Hugo Yepez MD    Completed [employer]          * Ataxia and right sided weakness    -Likely due to TIA.   -Continue ASA and statin.   -Patient was evaluated for Rehab and determined not to be a candidate as his symptoms resolved.          Headache    -Possibly associated with neurologic process.   -Resolved.           Hyperkalemia    -Most likely a lab error.   -Repeat labs show normal potassium.         Dementia    Continue Aricept.           Hyperlipidemia    Continue statin.             Essential hypertension    Resume home medications.             Final Active Diagnoses:    Diagnosis Date Noted POA    PRINCIPAL PROBLEM:  Ataxia and right sided weakness [R27.0] 12/31/2017 Yes    Headache [R51] 12/31/2017 Yes    Hyperkalemia [E87.5] 12/31/2017 Unknown    Dementia [F03.90]  Yes    Hyperlipidemia [E78.5] 03/14/2016 Yes    Essential hypertension [I10] 02/06/2014 Yes      Problems Resolved During this Admission:    Diagnosis Date Noted Date Resolved POA       Discharged Condition: stable    Disposition: Home or Self Care    Follow Up:  Follow-up Information     Leigha Diallo MD In 3 days.    Specialty:  Family Medicine  Contact information:  [address] [address]  [phone]             Cooper Parekh IV, MD In 1 week.    Specialty:  Urology  Contact information:  [address]  [place] [address]  [phone]                 Patient  Instructions:     Diet Low Sodium, 2gm         Significant Diagnostic Studies: Labs:   BMP:   Recent Labs  Lab 12/31/17  0516 12/31/17  1455    91    140   K 5.6* 4.6  4.6    107   CO2 22* 24   BUN 12 14   CREATININE 1.1 1.1   CALCIUM 9.1 9.2   , CMP   Recent Labs  Lab 12/31/17  0516 12/31/17  1455    140   K 5.6* 4.6  4.6    107   CO2 22* 24    91   BUN 12 14   CREATININE 1.1 1.1   CALCIUM 9.1 9.2   PROT 7.9 7.0   ALBUMIN 3.3* 3.4*   BILITOT 0.6 1.0   ALKPHOS 84 94   AST 40 13   ALT 7* <5*   ANIONGAP 8 9   ESTGFRAFRICA >60 >60   EGFRNONAA >60 >60    and All labs within the past 24 hours have been reviewed    Pending Diagnostic Studies:     Procedure Component Value Units Date/Time    Lipid panel [[id number]] Collected:  12/31/17 1455    Order Status:  Sent Lab Status:  In process Updated:  12/31/17 1456    Specimen:  Blood from Blood          Medications:  Reconciled Home Medications:   Current Discharge Medication List      START taking these medications    Details   aspirin (ECOTRIN) 81 MG EC tablet Take 1 tablet (81 mg total) by mouth once daily.  Qty: 30 tablet, Refills: 0      ciprofloxacin HCl (CIPRO) 500 MG tablet Take 1 tablet (500 mg total) by mouth every 12 (twelve) hours.  Qty: 14 tablet, Refills: 0      tamsulosin (FLOMAX) 0.4 mg Cp24 Take 1 capsule (0.4 mg total) by mouth once daily.  Qty: 30 capsule, Refills: 0         CONTINUE these medications which have NOT CHANGED    Details   amlodipine-benazepril 5-20 mg (LOTREL) 5-20 mg per capsule Take 2 capsules by mouth once daily.  Qty: 60 capsule, Refills: 5    Associated Diagnoses: HTN (hypertension), benign      cloNIDine (CATAPRES) 0.3 MG tablet Take 1 tablet (0.3 mg total) by mouth 3 (three) times daily.  Qty: 90 tablet, Refills: 5    Associated Diagnoses: HTN (hypertension), benign      cyanocobalamin (VITAMIN B-12) 1,000 mcg/mL injection Inject 1 mL (1,000 mcg total) into the muscle every 30 days.  Qty: 10 mL,  Refills: 5    Associated Diagnoses: Vitamin B12 deficiency      donepezil (ARICEPT) 10 MG tablet Take 1 tablet (10 mg total) by mouth every evening.  Qty: 30 tablet, Refills: 5      furosemide (LASIX) 40 MG tablet Take 1 tablet (40 mg total) by mouth once daily.  Qty: 30 tablet, Refills: 4    Associated Diagnoses: HTN (hypertension), benign      megestrol (MEGACE) 400 mg/10 mL (40 mg/mL) Susp Take 20 mLs (800 mg total) by mouth once daily.  Qty: 480 mL, Refills: 3    Associated Diagnoses: Weight loss      metoprolol succinate (TOPROL-XL) 25 MG 24 hr tablet Take 1 tablet (25 mg total) by mouth once daily.  Qty: 30 tablet, Refills: 5    Associated Diagnoses: HTN (hypertension), benign      multivit-iron-FA-calcium-mins 9 mg iron-400 mcg Tab tablet Take 1 tablet by mouth once daily.  Qty: 30 tablet, Refills: 0      promethazine (PHENERGAN) 25 MG tablet Take 1 tablet (25 mg total) by mouth 2 (two) times daily as needed for Nausea.  Qty: 60 tablet, Refills: 3    Associated Diagnoses: Nausea      atorvastatin (LIPITOR) 10 MG tablet Take 1 tablet (10 mg total) by mouth once daily.  Qty: 90 tablet, Refills: 4      oxycodone (ROXICODONE) 30 MG Tab Take 1 tablet (30 mg total) by mouth daily as needed.  Qty: 15 tablet, Refills: 0    Associated Diagnoses: Spinal stenosis of lumbar region with radiculopathy      tramadol (ULTRAM) 50 mg tablet Take 1 tablet (50 mg total) by mouth every 6 (six) hours as needed for Pain.  Qty: 12 tablet, Refills: 0             Indwelling Lines/Drains at time of discharge:   Lines/Drains/Airways          No matching active lines, drains, or airways          Time spent on the discharge of patient: Greater than 30 minutes.   Patient was seen and examined on the date of discharge and determined to be suitable for discharge.         MALINA Balbuena  Department of Hospital Medicine  [facility]
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
## 2017-12-31 NOTE — ASSESSMENT & PLAN NOTE
-Likely due to TIA.   -Continue ASA and statin.   -Patient was evaluated for Rehab and determined not to be a candidate as his symptoms resolved.

## 2017-12-31 NOTE — NURSING
Discharged orders received and reviewed with family and pt. Pt instructed when to take each medication next dose. IV removed, telemetry removed. Pt assisted with dressing by staff. Pt transported to [facility] via w/c by staff for family to transport home.
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
## 2017-12-31 NOTE — ED NOTES
"Pt was being uncooperative with orthostatics and he refused to have his pressure sitting or standing. Pt stated, "he is too weak and we need to wait to do orthostatics when he is stronger." Pt refused to try to sit or stand with assistance from myself and the physical therapist that was at the bedside. Nurse notified.   "

## 2017-12-31 NOTE — ASSESSMENT & PLAN NOTE
-Possibly associated with neurologic process.   -Continue work up as above.   -Symptomatic care.

## 2017-12-31 NOTE — ED NOTES
Pt still refuses to do orthostatics, he finally gave a urine sample after refusing to provide one after being requested multiple times throughout the morning.

## 2017-12-31 NOTE — PROGRESS NOTES
Pt arrived to floor. María bedside report given. VSS Cardiac monitor placed. Call light in reach and hourly rounding made.
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
## 2017-12-31 NOTE — ASSESSMENT & PLAN NOTE
-Rule out CVA.   -Follow up UA.   -Check 2D echo, carotid US and lipid panel.   -PT and OT evaluation.   -Continue statin and ASA.

## 2017-12-31 NOTE — ED NOTES
Pt sleeping in bed, respirations even and unlabored. Pt easily arousable and oriented x4 with c/o 10/10 HA. Pt aware he is awaiting lab results. Will continue to monitor. Bed in low position, side rails up, call light in reach, family at bedside.

## 2017-12-31 NOTE — ASSESSMENT & PLAN NOTE
-Possible lab error.   -No EKG changes.   -Will continue to monitor and treat as needed.   -Ace Inhibitor on hold.

## 2017-12-31 NOTE — ED NOTES
Did a swallow test with tsp of water, pt showed no difficulty with it, gave 2nd tsp of water, no complaints or visual difficulties  Swallowing, no coughing or choking noted so gave about one ounce of coffee per pt request and again no coughing, choking, or difficulty noted.

## 2017-12-31 NOTE — HPI
Cooper Pope is a 62 year old male with dementia and previous CVA who presented to the Emergency Room overnight with reports of headache, dizziness and weakness making ambulation difficult. He reports laying in bed to go to sleep and feeling as if he were having another stroke. He notes feeling generally warm and not like himself. The patient's headache is frontal and described as severe. There is associated sensitivity to light. The patient describes his dizziness as the room spinning. He does not report any weakness. However, on exam he has focal right sided weakness. The patient's wife reports that his deficits following one of his previous CVAs included right sided weakness. However, she reports that he recovered fully from this and usually walks their dog daily. He denies any fever, chills, dysphagia, bladder and bowel incontinence as well as chest pain. In the ED, work up showed an elevated potassium of 5.6 with a repeat pending and a CT scan of the head that is negative for acute findings. Code status was discussed with the patient. He is a full code. His wife is his surrogate medical decision maker.
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
## 2017-12-31 NOTE — HOSPITAL COURSE
The patient was admitted for evaluation of ataxia, right sided weakness, headache and dizziness. He was also found to have an elevated potassium and UTI. CT scan of the head, MRI of the head, carotid US and 2D echo were negative for acute findings. Neurology elevated the patient and felt that his symptoms were due to TIA as they gradually resolved during his hospitalization. The patient did not report taking an ASA daily. He and his wife denied history of bleeding and were unsure as to why he was not taking one. He was instructed to restart a daily ASA regimen. Prior to discharge, the patient complained of symptoms of BPH. He was started on Flomax and asked to follow up with his Urologist, Dr. Parekh. Repeat labs showed a normal potassium. The initial potassium level was likely a lab error.

## 2018-01-01 LAB
CHOLEST SERPL-MCNC: 54 MG/DL
CHOLEST/HDLC SERPL: 1 {RATIO}
HDLC SERPL-MCNC: 53 MG/DL
HDLC SERPL: 98.1 %
LDLC SERPL CALC-MCNC: ABNORMAL MG/DL
NONHDLC SERPL-MCNC: 1 MG/DL
TRIGL SERPL-MCNC: 47 MG/DL

## 2018-01-12 ENCOUNTER — TELEPHONE (OUTPATIENT)
Dept: NEPHROLOGY | Facility: CLINIC | Age: 63
End: 2018-01-12

## 2018-01-12 NOTE — TELEPHONE ENCOUNTER
Called pt. At both numbers listed regarding rescheduling appt. with Dr. Natali Jenkins on 1/16/18 as he will be out of the office. , no answer, no option to leave voice message.

## 2018-01-23 ENCOUNTER — TELEPHONE (OUTPATIENT)
Dept: NEPHROLOGY | Facility: CLINIC | Age: 63
End: 2018-01-23

## 2018-01-23 NOTE — TELEPHONE ENCOUNTER
----- Message from Kelli Palomo sent at 1/23/2018 10:01 AM CST -----  Contact: Alba  Patient forgot about his appointment, Please call her at  [phone] about getting him work back in.    Thanks  td

## 2018-02-27 ENCOUNTER — TELEPHONE (OUTPATIENT)
Dept: NEPHROLOGY | Facility: CLINIC | Age: 63
End: 2018-02-27

## 2018-02-27 NOTE — TELEPHONE ENCOUNTER
----- Message from Waqar Jordan sent at 2/27/2018  2:46 PM CST -----  Contact: Pt-scottie    Pt- wife scottie called in regards to an appointment pt need to be seen soon as possible callback [phone]

## 2018-03-05 ENCOUNTER — OFFICE VISIT (OUTPATIENT)
Dept: NEUROLOGY | Facility: CLINIC | Age: 63
End: 2018-03-05
Payer: MEDICAID

## 2018-03-05 VITALS
HEIGHT: 72 IN | BODY MASS INDEX: 21.89 KG/M2 | HEART RATE: 84 BPM | DIASTOLIC BLOOD PRESSURE: 90 MMHG | SYSTOLIC BLOOD PRESSURE: 166 MMHG | WEIGHT: 161.63 LBS

## 2018-03-05 DIAGNOSIS — M79.641 PAIN OF RIGHT HAND: Primary | ICD-10-CM

## 2018-03-05 DIAGNOSIS — M25.572 LEFT ANKLE PAIN, UNSPECIFIED CHRONICITY: ICD-10-CM

## 2018-03-05 PROCEDURE — 99999 PR PBB SHADOW E&M-EST. PATIENT-LVL IV: CPT | Mod: PBBFAC,,, | Performed by: PSYCHIATRY & NEUROLOGY

## 2018-03-05 PROCEDURE — 99213 OFFICE O/P EST LOW 20 MIN: CPT | Mod: S$PBB,,, | Performed by: PSYCHIATRY & NEUROLOGY

## 2018-03-05 PROCEDURE — 99214 OFFICE O/P EST MOD 30 MIN: CPT | Mod: PBBFAC,PO | Performed by: PSYCHIATRY & NEUROLOGY

## 2018-03-05 RX ORDER — ESCITALOPRAM OXALATE 10 MG/1
10 TABLET ORAL DAILY
Qty: 30 TABLET | Refills: 11 | Status: SHIPPED | OUTPATIENT
Start: 2018-03-05 | End: 2019-03-29 | Stop reason: SDUPTHER

## 2018-03-05 RX ORDER — TRAMADOL HYDROCHLORIDE 50 MG/1
50 TABLET ORAL EVERY 8 HOURS PRN
Qty: 20 TABLET | Refills: 0 | Status: SHIPPED | OUTPATIENT
Start: 2018-03-05 | End: 2018-03-15 | Stop reason: SDUPTHER
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
## 2018-03-06 ENCOUNTER — TELEPHONE (OUTPATIENT)
Dept: FAMILY MEDICINE | Facility: CLINIC | Age: 63
End: 2018-03-06

## 2018-03-06 DIAGNOSIS — M48.061 SPINAL STENOSIS OF LUMBAR REGION WITHOUT NEUROGENIC CLAUDICATION: Primary | ICD-10-CM

## 2018-03-14 ENCOUNTER — OFFICE VISIT (OUTPATIENT)
Dept: UROLOGY | Facility: CLINIC | Age: 63
End: 2018-03-14
Payer: MEDICAID

## 2018-03-14 VITALS
WEIGHT: 162.25 LBS | HEIGHT: 72 IN | HEART RATE: 84 BPM | BODY MASS INDEX: 21.98 KG/M2 | SYSTOLIC BLOOD PRESSURE: 194 MMHG | DIASTOLIC BLOOD PRESSURE: 110 MMHG

## 2018-03-14 DIAGNOSIS — N39.0 URINARY TRACT INFECTION WITHOUT HEMATURIA, SITE UNSPECIFIED: Primary | ICD-10-CM

## 2018-03-14 DIAGNOSIS — R51.9 HEADACHE DISORDER: ICD-10-CM

## 2018-03-14 PROCEDURE — 99999 PR PBB SHADOW E&M-EST. PATIENT-LVL III: CPT | Mod: PBBFAC,,, | Performed by: UROLOGY

## 2018-03-14 PROCEDURE — 99213 OFFICE O/P EST LOW 20 MIN: CPT | Mod: PBBFAC | Performed by: UROLOGY

## 2018-03-14 PROCEDURE — 99214 OFFICE O/P EST MOD 30 MIN: CPT | Mod: S$PBB,,, | Performed by: UROLOGY
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
## 2018-03-15 ENCOUNTER — OFFICE VISIT (OUTPATIENT)
Dept: FAMILY MEDICINE | Facility: CLINIC | Age: 63
End: 2018-03-15
Payer: MEDICAID

## 2018-03-15 ENCOUNTER — TELEPHONE (OUTPATIENT)
Dept: FAMILY MEDICINE | Facility: CLINIC | Age: 63
End: 2018-03-15

## 2018-03-15 VITALS
TEMPERATURE: 99 F | OXYGEN SATURATION: 99 % | DIASTOLIC BLOOD PRESSURE: 120 MMHG | BODY MASS INDEX: 22.15 KG/M2 | WEIGHT: 163.56 LBS | RESPIRATION RATE: 17 BRPM | SYSTOLIC BLOOD PRESSURE: 190 MMHG | HEIGHT: 72 IN | HEART RATE: 109 BPM

## 2018-03-15 DIAGNOSIS — R11.0 NAUSEA: ICD-10-CM

## 2018-03-15 DIAGNOSIS — I16.0 HYPERTENSIVE URGENCY: ICD-10-CM

## 2018-03-15 PROCEDURE — 99215 OFFICE O/P EST HI 40 MIN: CPT | Mod: S$PBB,,, | Performed by: FAMILY MEDICINE

## 2018-03-15 PROCEDURE — 99214 OFFICE O/P EST MOD 30 MIN: CPT | Mod: PBBFAC,PO | Performed by: FAMILY MEDICINE

## 2018-03-15 PROCEDURE — 99999 PR PBB SHADOW E&M-EST. PATIENT-LVL IV: CPT | Mod: PBBFAC,,, | Performed by: FAMILY MEDICINE

## 2018-03-15 RX ORDER — ACETAMINOPHEN 500 MG
1 TABLET ORAL DAILY
Qty: 1 EACH | Refills: 0 | COMMUNITY
Start: 2018-03-15

## 2018-03-15 RX ORDER — AMLODIPINE AND BENAZEPRIL HYDROCHLORIDE 5; 20 MG/1; MG/1
2 CAPSULE ORAL DAILY
Qty: 60 CAPSULE | Refills: 0 | Status: SHIPPED | OUTPATIENT
Start: 2018-03-15 | End: 2019-03-04 | Stop reason: SDUPTHER

## 2018-03-15 RX ORDER — METOPROLOL SUCCINATE 25 MG/1
25 TABLET, EXTENDED RELEASE ORAL DAILY
Qty: 30 TABLET | Refills: 0 | Status: SHIPPED | OUTPATIENT
Start: 2018-03-15 | End: 2018-05-09 | Stop reason: ALTCHOICE

## 2018-03-15 RX ORDER — PROMETHAZINE HYDROCHLORIDE 25 MG/1
25 TABLET ORAL 2 TIMES DAILY PRN
Qty: 60 TABLET | Refills: 0 | Status: SHIPPED | OUTPATIENT
Start: 2018-03-15 | End: 2019-03-04 | Stop reason: SDUPTHER

## 2018-03-15 RX ORDER — CLONIDINE HYDROCHLORIDE 0.3 MG/1
0.3 TABLET ORAL 3 TIMES DAILY
Qty: 90 TABLET | Refills: 0 | Status: SHIPPED | OUTPATIENT
Start: 2018-03-15 | End: 2018-05-10 | Stop reason: SDUPTHER

## 2018-03-15 NOTE — TELEPHONE ENCOUNTER
----- Message from Sneha Palomo sent at 3/15/2018  9:42 AM CDT -----  Contact: pt   Pt will be 10 mins late due to traffic.    [phone] (home)
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
## 2018-05-07 ENCOUNTER — TELEPHONE (OUTPATIENT)
Dept: FAMILY MEDICINE | Facility: CLINIC | Age: 63
End: 2018-05-07

## 2018-05-07 NOTE — TELEPHONE ENCOUNTER
----- Message from Brenton Novak sent at 5/7/2018 11:20 AM CDT -----  Contact: [phone]  Would like same-day appointment for Er follow-up, was seen in Er in [place].  Please call back at [phone].  Md Deanna

## 2018-05-08 ENCOUNTER — TELEPHONE (OUTPATIENT)
Dept: FAMILY MEDICINE | Facility: CLINIC | Age: 63
End: 2018-05-08

## 2018-05-08 NOTE — TELEPHONE ENCOUNTER
----- Message from Kelli Palomo sent at 5/8/2018  1:55 PM CDT -----  Contact: Patient   Patient needs to reschedule his appointment, Please call him at [phone].     Thanks  Td

## 2018-05-09 ENCOUNTER — OFFICE VISIT (OUTPATIENT)
Dept: FAMILY MEDICINE | Facility: CLINIC | Age: 63
End: 2018-05-09
Payer: MEDICAID

## 2018-05-09 ENCOUNTER — LAB VISIT (OUTPATIENT)
Dept: LAB | Facility: HOSPITAL | Age: 63
End: 2018-05-09
Payer: MEDICAID

## 2018-05-09 VITALS
OXYGEN SATURATION: 98 % | TEMPERATURE: 99 F | DIASTOLIC BLOOD PRESSURE: 104 MMHG | BODY MASS INDEX: 21.74 KG/M2 | WEIGHT: 160.5 LBS | HEIGHT: 72 IN | RESPIRATION RATE: 18 BRPM | SYSTOLIC BLOOD PRESSURE: 160 MMHG | HEART RATE: 100 BPM

## 2018-05-09 DIAGNOSIS — Z09 HOSPITAL DISCHARGE FOLLOW-UP: ICD-10-CM

## 2018-05-09 DIAGNOSIS — Z09 HOSPITAL DISCHARGE FOLLOW-UP: Primary | ICD-10-CM

## 2018-05-09 DIAGNOSIS — I10 ESSENTIAL HYPERTENSION: ICD-10-CM

## 2018-05-09 LAB
ALBUMIN SERPL BCP-MCNC: 3.8 G/DL
ALP SERPL-CCNC: 82 U/L
ALT SERPL W/O P-5'-P-CCNC: 8 U/L
ANION GAP SERPL CALC-SCNC: 10 MMOL/L
AST SERPL-CCNC: 17 U/L
BASOPHILS # BLD AUTO: 0.03 K/UL
BASOPHILS NFR BLD: 0.6 %
BILIRUB SERPL-MCNC: 1 MG/DL
BUN SERPL-MCNC: 15 MG/DL
CALCIUM SERPL-MCNC: 9.5 MG/DL
CHLORIDE SERPL-SCNC: 107 MMOL/L
CO2 SERPL-SCNC: 21 MMOL/L
CREAT SERPL-MCNC: 1.3 MG/DL
DIFFERENTIAL METHOD: ABNORMAL
EOSINOPHIL # BLD AUTO: 0.1 K/UL
EOSINOPHIL NFR BLD: 2.1 %
ERYTHROCYTE [DISTWIDTH] IN BLOOD BY AUTOMATED COUNT: 15.1 %
EST. GFR  (AFRICAN AMERICAN): >60 ML/MIN/1.73 M^2
EST. GFR  (NON AFRICAN AMERICAN): 57.7 ML/MIN/1.73 M^2
GLUCOSE SERPL-MCNC: 75 MG/DL
HCT VFR BLD AUTO: 33.7 %
HGB BLD-MCNC: 10.9 G/DL
IMM GRANULOCYTES # BLD AUTO: 0.01 K/UL
IMM GRANULOCYTES NFR BLD AUTO: 0.2 %
LYMPHOCYTES # BLD AUTO: 1.3 K/UL
LYMPHOCYTES NFR BLD: 25.1 %
MCH RBC QN AUTO: 34.5 PG
MCHC RBC AUTO-ENTMCNC: 32.3 G/DL
MCV RBC AUTO: 107 FL
MONOCYTES # BLD AUTO: 0.6 K/UL
MONOCYTES NFR BLD: 11.1 %
NEUTROPHILS # BLD AUTO: 3.2 K/UL
NEUTROPHILS NFR BLD: 60.9 %
NRBC BLD-RTO: 0 /100 WBC
PLATELET # BLD AUTO: 218 K/UL
PMV BLD AUTO: 9.5 FL
POTASSIUM SERPL-SCNC: 4.7 MMOL/L
PROT SERPL-MCNC: 7.6 G/DL
RBC # BLD AUTO: 3.16 M/UL
SODIUM SERPL-SCNC: 138 MMOL/L
WBC # BLD AUTO: 5.21 K/UL

## 2018-05-09 PROCEDURE — 99214 OFFICE O/P EST MOD 30 MIN: CPT | Mod: S$PBB,,, | Performed by: FAMILY MEDICINE

## 2018-05-09 PROCEDURE — 80053 COMPREHEN METABOLIC PANEL: CPT

## 2018-05-09 PROCEDURE — 99214 OFFICE O/P EST MOD 30 MIN: CPT | Mod: PBBFAC,PO | Performed by: FAMILY MEDICINE

## 2018-05-09 PROCEDURE — 99999 PR PBB SHADOW E&M-EST. PATIENT-LVL IV: CPT | Mod: PBBFAC,,, | Performed by: FAMILY MEDICINE

## 2018-05-09 PROCEDURE — 85025 COMPLETE CBC W/AUTO DIFF WBC: CPT

## 2018-05-09 PROCEDURE — 36415 COLL VENOUS BLD VENIPUNCTURE: CPT | Mod: PO

## 2018-05-09 RX ORDER — METOPROLOL SUCCINATE 50 MG/1
50 TABLET, EXTENDED RELEASE ORAL DAILY
Qty: 30 TABLET | Refills: 0 | Status: SHIPPED | OUTPATIENT
Start: 2018-05-09 | End: 2018-05-10 | Stop reason: SDUPTHER
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
## 2018-05-10 ENCOUNTER — OFFICE VISIT (OUTPATIENT)
Dept: CARDIOLOGY | Facility: CLINIC | Age: 63
End: 2018-05-10
Payer: MEDICAID

## 2018-05-10 VITALS
BODY MASS INDEX: 21.74 KG/M2 | HEART RATE: 84 BPM | WEIGHT: 160.5 LBS | DIASTOLIC BLOOD PRESSURE: 110 MMHG | SYSTOLIC BLOOD PRESSURE: 170 MMHG | HEIGHT: 72 IN

## 2018-05-10 DIAGNOSIS — I16.0 HYPERTENSIVE URGENCY: ICD-10-CM

## 2018-05-10 DIAGNOSIS — R07.9 CHEST PAIN AT REST: Primary | ICD-10-CM

## 2018-05-10 DIAGNOSIS — I10 ESSENTIAL HYPERTENSION: ICD-10-CM

## 2018-05-10 DIAGNOSIS — E78.00 PURE HYPERCHOLESTEROLEMIA: ICD-10-CM

## 2018-05-10 PROCEDURE — 99213 OFFICE O/P EST LOW 20 MIN: CPT | Mod: PBBFAC,PO | Performed by: NUCLEAR MEDICINE

## 2018-05-10 PROCEDURE — 99215 OFFICE O/P EST HI 40 MIN: CPT | Mod: S$PBB,,, | Performed by: NUCLEAR MEDICINE

## 2018-05-10 PROCEDURE — 99999 PR PBB SHADOW E&M-EST. PATIENT-LVL III: CPT | Mod: PBBFAC,,, | Performed by: NUCLEAR MEDICINE

## 2018-05-10 PROCEDURE — 93005 ELECTROCARDIOGRAM TRACING: CPT | Mod: PBBFAC,PO | Performed by: INTERNAL MEDICINE

## 2018-05-10 PROCEDURE — 93010 ELECTROCARDIOGRAM REPORT: CPT | Mod: S$PBB,,, | Performed by: INTERNAL MEDICINE

## 2018-05-10 RX ORDER — SPIRONOLACTONE 50 MG/1
50 TABLET, FILM COATED ORAL DAILY
Qty: 30 TABLET | Refills: 6 | Status: SHIPPED | OUTPATIENT
Start: 2018-05-10 | End: 2019-03-29 | Stop reason: SDUPTHER

## 2018-05-10 RX ORDER — CLONIDINE HYDROCHLORIDE 0.3 MG/1
0.3 TABLET ORAL 2 TIMES DAILY
Qty: 90 TABLET | Refills: 0
Start: 2018-05-10 | End: 2018-06-07 | Stop reason: SDUPTHER

## 2018-05-10 RX ORDER — METOPROLOL SUCCINATE 50 MG/1
100 TABLET, EXTENDED RELEASE ORAL DAILY
Qty: 30 TABLET | Refills: 0
Start: 2018-05-10 | End: 2019-03-29 | Stop reason: SDUPTHER

## 2018-05-10 NOTE — PROGRESS NOTES
Subjective:   Patient ID:  Cooper Pope Jr. is a 62 y.o. male who presents for follow-up of Hospital Follow Up      HPI1- RESISTANT ESSENTIAL HYPERTENSION  ABOUT 2 WEEKS AGO WHILE VISITING HER DAUGHTER IN [place], WENT TO ED BECAUSE OF ATYPICAL CHEST PAIN, AND ELEVATED BP,  PATIENT RULE OUT FOR ACS  KNOWN TO OUR GROUP,  PRIMARY CARDIOLOGIST DR NOONAN,  LAST SEEN  January 2017 BY RACHAEL KAREN,  HX OF HTN WITH LVH AND HFPEF, DD,  CHRONIC CHEST PAIN- NEGATIVE WORK UP FOR ISCHEMIA  PRESENTLY- CONCERN ABOUT ELEVATION OF BP  NO ANGINA OR EQUIVALENT  NO UNUSUAL COBB. NO ORTHOPNEA OR PND  NO FOCAL CNS SYMPTOMS OR SIGNS TO SUGGEST TIA OR STROKE  NO PALPITATIONS  ECG TODAY- SR LVH WITH CHRONIC REPOLARIZATION CHANGES  CARD MED- POOR COMPLIANCE    Review of Systems   Constitution: Negative for chills, fever, weakness, night sweats, weight gain and weight loss.   HENT: Negative for nosebleeds.    Eyes: Negative for blurred vision, double vision and visual disturbance.   Cardiovascular: Negative for chest pain, dyspnea on exertion, irregular heartbeat, leg swelling, orthopnea, palpitations, paroxysmal nocturnal dyspnea and syncope.   Respiratory: Negative for cough, hemoptysis and wheezing.    Endocrine: Negative for polydipsia and polyuria.   Hematologic/Lymphatic: Does not bruise/bleed easily.   Skin: Negative for rash.   Musculoskeletal: Negative for joint pain, joint swelling, muscle weakness and myalgias.   Gastrointestinal: Negative for abdominal pain, hematemesis, jaundice and melena.   Genitourinary: Negative for dysuria, hematuria and nocturia.   Neurological: Negative for dizziness, focal weakness, headaches and sensory change.   Psychiatric/Behavioral: Negative for depression. The patient does not have insomnia and is not nervous/anxious.      Family History   Problem Relation Age of Onset    Alzheimer's disease Mother     Diabetes Mother     Arthritis Mother     Lung cancer Brother     Stomach cancer Maternal Uncle      Lung cancer Maternal Uncle      Past Medical History:   Diagnosis Date    Anemia of chronic disease 4/23/2016    Anxiety     B12 deficiency     Colon polyp     Degenerative arthritis     Dementia     Encounter for blood transfusion     Essential hypertension 2/6/2014    Hep C w/o coma, chronic     Hx of peptic ulcer     Hyperlipidemia     Renal cell cancer     Spinal stenosis of lumbar region with radiculopathy 4/22/2016    Stroke     2006    TIA (transient ischemic attack)     after CVA     Current Outpatient Prescriptions on File Prior to Visit   Medication Sig Dispense Refill    amlodipine-benazepril 5-20 mg (LOTREL) 5-20 mg per capsule Take 2 capsules by mouth once daily. 60 capsule 0    aspirin (ECOTRIN) 81 MG EC tablet Take 1 tablet (81 mg total) by mouth once daily. (Patient taking differently: Take 81 mg by mouth daily as needed. ) 30 tablet 0    atorvastatin (LIPITOR) 10 MG tablet Take 1 tablet (10 mg total) by mouth once daily. (Patient taking differently: Take 10 mg by mouth every evening. ) 90 tablet 4    blood pressure monitor (BLOOD PRESSURE KIT) Kit 1 Units by Misc.(Non-Drug; Combo Route) route once daily. 1 each 0    cloNIDine (CATAPRES) 0.3 MG tablet Take 1 tablet (0.3 mg total) by mouth 3 (three) times daily. 90 tablet 0    cyanocobalamin (VITAMIN B-12) 1,000 mcg/mL injection Inject 1 mL (1,000 mcg total) into the muscle every 30 days. 10 mL 5    donepezil (ARICEPT) 10 MG tablet Take 1 tablet (10 mg total) by mouth every evening. 30 tablet 5    escitalopram oxalate (LEXAPRO) 10 MG tablet Take 1 tablet (10 mg total) by mouth once daily. 30 tablet 11    furosemide (LASIX) 40 MG tablet Take 1 tablet (40 mg total) by mouth once daily. 30 tablet 4    megestrol (MEGACE) 400 mg/10 mL (40 mg/mL) Susp Take 20 mLs (800 mg total) by mouth once daily. 480 mL 3    metoprolol succinate (TOPROL-XL) 50 MG 24 hr tablet Take 1 tablet (50 mg total) by mouth once daily. 30 tablet 0     multivit-iron-FA-calcium-mins 9 mg iron-400 mcg Tab tablet Take 1 tablet by mouth once daily. 30 tablet 0    promethazine (PHENERGAN) 25 MG tablet Take 1 tablet (25 mg total) by mouth 2 (two) times daily as needed for Nausea. 60 tablet 0    tamsulosin (FLOMAX) 0.4 mg Cp24 Take 1 capsule (0.4 mg total) by mouth once daily. 30 capsule 0     No current facility-administered medications on file prior to visit.      Review of patient's allergies indicates:   Allergen Reactions    Tylenol [acetaminophen] Hives       Objective:     Physical Exam   Constitutional: He is oriented to person, place, and time. He appears well-developed. No distress.   HENT:   Head: Normocephalic.   Eyes: Conjunctivae are normal. Pupils are equal, round, and reactive to light.   Neck: Neck supple. No JVD present. No thyromegaly present.   Cardiovascular: Normal rate, regular rhythm and intact distal pulses.  PMI is displaced.  Exam reveals no gallop and no friction rub.    Murmur heard.   Medium-pitched harsh crescendo-decrescendo midsystolic murmur is present with a grade of 3/6  at the upper left sternal border, lower left sternal border  Pulses:       Carotid pulses are 2+ on the right side, and 2+ on the left side.       Radial pulses are 2+ on the right side, and 2+ on the left side.        Femoral pulses are 2+ on the right side, and 2+ on the left side.       Popliteal pulses are 2+ on the right side, and 2+ on the left side.        Dorsalis pedis pulses are 2+ on the right side, and 2+ on the left side.        Posterior tibial pulses are 2+ on the right side, and 2+ on the left side.   Pulmonary/Chest: Breath sounds normal. He has no wheezes. He has no rales. He exhibits no tenderness.   Abdominal: Soft. Bowel sounds are normal. He exhibits no mass. There is no hepatosplenomegaly. There is no tenderness.   Musculoskeletal: He exhibits no edema or tenderness.        Cervical back: Normal.        Thoracic back: Normal.        Lumbar  back: Normal.   Lymphadenopathy:     He has no cervical adenopathy.     He has no axillary adenopathy.        Right: No supraclavicular adenopathy present.        Left: No supraclavicular adenopathy present.   Neurological: He is alert and oriented to person, place, and time. He has normal strength. No sensory deficit. Gait normal.   Skin: Skin is warm. No cyanosis. No pallor. Nails show no clubbing.   Psychiatric: He has a normal mood and affect. His speech is normal and behavior is normal. Cognition and memory are normal.       Assessment:     1. Essential hypertension    2. Pure hypercholesterolemia      UNCONTROLLED RESISTANT HYPERTENSION  NO CLINICAL EVIDENCE OF ACTIVE MYOCARDIAL ISCHEMIA. NO ARRHYTHMIAS. NO ADHF  CNS STATUS STABLE  Plan:     Essential hypertension    Pure hypercholesterolemia    1- DC LASIX  2- CHANGE CLONIDINE TO BID  3- INCREASE TOPROL  MG DAILY AM  4- ADD ALDACTONE 50 MG DAILY  5- RETURN IN 4 WEEKS WITH BMP

## 2018-06-07 ENCOUNTER — TELEPHONE (OUTPATIENT)
Dept: FAMILY MEDICINE | Facility: CLINIC | Age: 63
End: 2018-06-07

## 2018-06-07 ENCOUNTER — OFFICE VISIT (OUTPATIENT)
Dept: NEUROLOGY | Facility: CLINIC | Age: 63
End: 2018-06-07
Payer: MEDICAID

## 2018-06-07 VITALS
DIASTOLIC BLOOD PRESSURE: 132 MMHG | HEART RATE: 96 BPM | WEIGHT: 158.94 LBS | BODY MASS INDEX: 21.53 KG/M2 | SYSTOLIC BLOOD PRESSURE: 182 MMHG | RESPIRATION RATE: 20 BRPM | HEIGHT: 72 IN

## 2018-06-07 DIAGNOSIS — I16.0 HYPERTENSIVE URGENCY: ICD-10-CM

## 2018-06-07 PROCEDURE — 99999 PR PBB SHADOW E&M-EST. PATIENT-LVL III: CPT | Mod: PBBFAC,,, | Performed by: PSYCHIATRY & NEUROLOGY

## 2018-06-07 PROCEDURE — 99214 OFFICE O/P EST MOD 30 MIN: CPT | Mod: S$PBB,,, | Performed by: PSYCHIATRY & NEUROLOGY

## 2018-06-07 PROCEDURE — 99213 OFFICE O/P EST LOW 20 MIN: CPT | Mod: PBBFAC,PO | Performed by: PSYCHIATRY & NEUROLOGY

## 2018-06-07 RX ORDER — CLONIDINE HYDROCHLORIDE 0.3 MG/1
0.3 TABLET ORAL 3 TIMES DAILY
Qty: 90 TABLET | Refills: 0 | Status: SHIPPED | OUTPATIENT
Start: 2018-06-07 | End: 2018-10-10 | Stop reason: SDUPTHER

## 2018-06-07 NOTE — TELEPHONE ENCOUNTER
----- Message from Leigha Diallo MD sent at 6/7/2018  2:44 PM CDT -----  Thanks   Ill see if we can get him in tomorrow.   He is very non compliant with his medications and his bp does tend to go up when he is in a lot of pain.     Can we get him an appt tomorrow?    ----- Message -----  From: Liss Yen LPN  Sent: 6/7/2018   1:58 PM  To: Leigha Diallo MD    [facility]ly [facility], this pt is in clinic for neuro and his b/p is 182/132 dr quintanilla wants him to see you asap. hes not going to see him in clinic umtil its addressed. Thank you
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
## 2018-06-13 ENCOUNTER — TELEPHONE (OUTPATIENT)
Dept: FAMILY MEDICINE | Facility: CLINIC | Age: 63
End: 2018-06-13

## 2018-06-13 NOTE — TELEPHONE ENCOUNTER
----- Message from Leigha Diallo MD sent at 6/13/2018  3:17 PM CDT -----  Did we ever get an appt for follow up for mr del cid ?

## 2018-06-13 NOTE — TELEPHONE ENCOUNTER
LVM previously and again today to get pt scheduled for appt per another provider who states his BP was high.

## 2018-06-28 ENCOUNTER — TELEPHONE (OUTPATIENT)
Dept: UROLOGY | Facility: CLINIC | Age: 63
End: 2018-06-28

## 2018-06-28 DIAGNOSIS — N39.0 URINARY TRACT INFECTION WITHOUT HEMATURIA, SITE UNSPECIFIED: Primary | ICD-10-CM

## 2018-06-28 NOTE — TELEPHONE ENCOUNTER
Spoke with patient and informed him that he is to arrive 1 hour early for his CT scan on 7/2/18 to have a creatinine level drawn. Patient states understanding.

## 2018-06-28 NOTE — TELEPHONE ENCOUNTER
Called patient to schedule STAT creatinie 1 hr prior to CT scan. No answer; left message to call back.

## 2018-06-28 NOTE — TELEPHONE ENCOUNTER
----- Message from Ronda Dudley sent at 6/28/2018 10:49 AM CDT -----  Good Morning!    This patient is scheduled to have a radiology exam. We will need a recent creatinine for this patient (within the last 30 days). Please place STAT order and schedule patient 1 hour prior to radiology appointment. If you have any questions please contact Radiology at [phone].    Thank You,    Ronda EDMOND  Radiology Dept

## 2018-06-28 NOTE — TELEPHONE ENCOUNTER
----- Message from Mariluz Jack sent at 6/28/2018 12:05 PM CDT -----  Contact: pt  He's calling in regards to missed call ,pls call pt back at [phone] (home)

## 2018-06-29 ENCOUNTER — TELEPHONE (OUTPATIENT)
Dept: RADIOLOGY | Facility: HOSPITAL | Age: 63
End: 2018-06-29

## 2018-09-07 NOTE — TELEPHONE ENCOUNTER
----- Message from Brenton Novak sent at 3/6/2018  1:33 PM CST -----  Contact: self [phone]  Would like to consult with nurse regarding referral . Please call back at [phone].  Md Richard  
[phone]   Cristi Michel     Referral to pain management plz ma'am. States you said call with the information when he had it. He done now broke his foot. Thank he done his equilibrium.   
Order placed  
(0) Performs both tasks correctly

## 2018-10-10 DIAGNOSIS — I16.0 HYPERTENSIVE URGENCY: ICD-10-CM

## 2018-10-11 RX ORDER — CLONIDINE HYDROCHLORIDE 0.3 MG/1
TABLET ORAL
Qty: 90 TABLET | Refills: 0 | Status: SHIPPED | OUTPATIENT
Start: 2018-10-11 | End: 2019-02-28 | Stop reason: SDUPTHER

## 2019-02-28 DIAGNOSIS — R63.4 WEIGHT LOSS: ICD-10-CM

## 2019-02-28 DIAGNOSIS — I16.0 HYPERTENSIVE URGENCY: ICD-10-CM

## 2019-02-28 RX ORDER — CLONIDINE HYDROCHLORIDE 0.3 MG/1
0.3 TABLET ORAL 3 TIMES DAILY
Qty: 90 TABLET | Refills: 0 | Status: SHIPPED | OUTPATIENT
Start: 2019-02-28 | End: 2019-03-04 | Stop reason: SDUPTHER

## 2019-02-28 RX ORDER — MEGESTROL ACETATE 40 MG/ML
800 SUSPENSION ORAL DAILY
Qty: 480 ML | Refills: 3 | Status: SHIPPED | OUTPATIENT
Start: 2019-02-28 | End: 2019-03-29 | Stop reason: ALTCHOICE

## 2019-03-04 ENCOUNTER — NURSE TRIAGE (OUTPATIENT)
Dept: ADMINISTRATIVE | Facility: CLINIC | Age: 64
End: 2019-03-04

## 2019-03-04 DIAGNOSIS — R11.0 NAUSEA: ICD-10-CM

## 2019-03-04 DIAGNOSIS — I16.0 HYPERTENSIVE URGENCY: ICD-10-CM

## 2019-03-04 RX ORDER — CLONIDINE HYDROCHLORIDE 0.3 MG/1
0.3 TABLET ORAL 3 TIMES DAILY
Qty: 90 TABLET | Refills: 0 | Status: SHIPPED | OUTPATIENT
Start: 2019-03-04 | End: 2019-03-29 | Stop reason: SDUPTHER

## 2019-03-04 RX ORDER — PROMETHAZINE HYDROCHLORIDE 25 MG/1
25 TABLET ORAL 2 TIMES DAILY PRN
Qty: 60 TABLET | Refills: 0 | Status: SHIPPED | OUTPATIENT
Start: 2019-03-04 | End: 2019-03-29 | Stop reason: SDUPTHER

## 2019-03-04 RX ORDER — AMLODIPINE BESYLATE 5 MG/1
5 TABLET ORAL DAILY
Qty: 30 TABLET | Refills: 0 | Status: SHIPPED | OUTPATIENT
Start: 2019-03-04 | End: 2019-03-29 | Stop reason: SDUPTHER

## 2019-03-04 RX ORDER — AMLODIPINE AND BENAZEPRIL HYDROCHLORIDE 5; 20 MG/1; MG/1
2 CAPSULE ORAL DAILY
Qty: 60 CAPSULE | Refills: 0 | Status: CANCELLED | OUTPATIENT
Start: 2019-03-04 | End: 2020-03-03

## 2019-03-04 RX ORDER — BENAZEPRIL HYDROCHLORIDE 20 MG/1
20 TABLET ORAL DAILY
Qty: 30 TABLET | Refills: 0 | Status: SHIPPED | OUTPATIENT
Start: 2019-03-04 | End: 2019-03-29 | Stop reason: SDUPTHER

## 2019-03-04 NOTE — TELEPHONE ENCOUNTER
Reason for Disposition   BP = 180/110 and missed most recent dose of blood pressure medication    Protocols used: ST HIGH BLOOD PRESSURE-A-OH    Mr. Pope states he was taken to the ED in [place] by EMS yesterday because he passed out due to elevated BPs. Patient states he has request refills since Friday 3/1/19. He states his blood pressure this morning was 180/140. Patient asked to repeat BP during triage call, he refused. Patient states he is not going back to the ED. He states he feels funny, but denies chest pain or sob. Patient is requesting refills on amlodipine-benazepril, clonidine, and phenergan.

## 2019-03-04 NOTE — TELEPHONE ENCOUNTER
Patient has an appointment with you to establish care and is out of his medications per his wife.  Medications have been pended for your review.  Please advise

## 2019-03-04 NOTE — TELEPHONE ENCOUNTER
Patient has an establish care with you on 3/29/2019 and would like a refill of his blood pressure medications be sent to [facility] in [place].  Please advise

## 2019-03-04 NOTE — TELEPHONE ENCOUNTER
Need to split bp med into two medication as his insurance no longer covers. Will refill medications.

## 2019-03-29 ENCOUNTER — OFFICE VISIT (OUTPATIENT)
Dept: FAMILY MEDICINE | Facility: CLINIC | Age: 64
End: 2019-03-29
Payer: MEDICAID

## 2019-03-29 VITALS
HEART RATE: 79 BPM | WEIGHT: 151 LBS | BODY MASS INDEX: 20.45 KG/M2 | SYSTOLIC BLOOD PRESSURE: 173 MMHG | HEIGHT: 72 IN | DIASTOLIC BLOOD PRESSURE: 114 MMHG | OXYGEN SATURATION: 99 % | TEMPERATURE: 98 F

## 2019-03-29 DIAGNOSIS — F41.9 ANXIETY: ICD-10-CM

## 2019-03-29 DIAGNOSIS — I16.0 HYPERTENSIVE URGENCY: ICD-10-CM

## 2019-03-29 DIAGNOSIS — Z79.899 ENCOUNTER FOR MEDICATION MANAGEMENT: ICD-10-CM

## 2019-03-29 DIAGNOSIS — G89.4 CHRONIC PAIN SYNDROME: Primary | ICD-10-CM

## 2019-03-29 DIAGNOSIS — I10 ESSENTIAL HYPERTENSION: ICD-10-CM

## 2019-03-29 DIAGNOSIS — M48.061 SPINAL STENOSIS OF LUMBAR REGION, UNSPECIFIED WHETHER NEUROGENIC CLAUDICATION PRESENT: ICD-10-CM

## 2019-03-29 DIAGNOSIS — R11.0 NAUSEA: ICD-10-CM

## 2019-03-29 DIAGNOSIS — E53.8 VITAMIN B12 DEFICIENCY: ICD-10-CM

## 2019-03-29 PROCEDURE — 99215 PR OFFICE/OUTPT VISIT, EST, LEVL V, 40-54 MIN: ICD-10-PCS | Mod: S$PBB,,, | Performed by: FAMILY MEDICINE

## 2019-03-29 PROCEDURE — 99215 OFFICE O/P EST HI 40 MIN: CPT | Mod: S$PBB,,, | Performed by: FAMILY MEDICINE

## 2019-03-29 PROCEDURE — 99214 OFFICE O/P EST MOD 30 MIN: CPT | Mod: PBBFAC,PO | Performed by: FAMILY MEDICINE

## 2019-03-29 PROCEDURE — 99999 PR PBB SHADOW E&M-EST. PATIENT-LVL IV: CPT | Mod: PBBFAC,,, | Performed by: FAMILY MEDICINE

## 2019-03-29 PROCEDURE — 99999 PR PBB SHADOW E&M-EST. PATIENT-LVL IV: ICD-10-PCS | Mod: PBBFAC,,, | Performed by: FAMILY MEDICINE

## 2019-03-29 RX ORDER — ASPIRIN 81 MG/1
81 TABLET ORAL DAILY
Qty: 30 TABLET | Refills: 0 | Status: SHIPPED | OUTPATIENT
Start: 2019-03-29 | End: 2020-10-22

## 2019-03-29 RX ORDER — CLONIDINE HYDROCHLORIDE 0.3 MG/1
0.3 TABLET ORAL 3 TIMES DAILY
Qty: 90 TABLET | Refills: 2 | Status: SHIPPED | OUTPATIENT
Start: 2019-03-29 | End: 2019-07-25 | Stop reason: SDUPTHER

## 2019-03-29 RX ORDER — CYANOCOBALAMIN 1000 UG/ML
1000 INJECTION, SOLUTION INTRAMUSCULAR; SUBCUTANEOUS
Qty: 10 ML | Refills: 5 | Status: SHIPPED | OUTPATIENT
Start: 2019-03-29 | End: 2020-10-22 | Stop reason: SDUPTHER

## 2019-03-29 RX ORDER — CYPROHEPTADINE HYDROCHLORIDE 4 MG/1
4 TABLET ORAL 3 TIMES DAILY PRN
Qty: 90 TABLET | Refills: 2 | Status: SHIPPED | OUTPATIENT
Start: 2019-03-29 | End: 2021-02-22 | Stop reason: SDUPTHER

## 2019-03-29 RX ORDER — DONEPEZIL HYDROCHLORIDE 10 MG/1
10 TABLET, FILM COATED ORAL NIGHTLY
Qty: 30 TABLET | Refills: 5 | Status: SHIPPED | OUTPATIENT
Start: 2019-03-29 | End: 2019-07-25 | Stop reason: SDUPTHER

## 2019-03-29 RX ORDER — AMLODIPINE BESYLATE 5 MG/1
5 TABLET ORAL DAILY
Qty: 30 TABLET | Refills: 5 | Status: SHIPPED | OUTPATIENT
Start: 2019-03-29 | End: 2019-04-04 | Stop reason: SDUPTHER

## 2019-03-29 RX ORDER — ATORVASTATIN CALCIUM 10 MG/1
10 TABLET, FILM COATED ORAL DAILY
Qty: 90 TABLET | Refills: 4 | Status: SHIPPED | OUTPATIENT
Start: 2019-03-29 | End: 2019-07-25 | Stop reason: SDUPTHER

## 2019-03-29 RX ORDER — ESCITALOPRAM OXALATE 10 MG/1
10 TABLET ORAL DAILY
Qty: 30 TABLET | Refills: 11 | Status: SHIPPED | OUTPATIENT
Start: 2019-03-29 | End: 2019-04-25

## 2019-03-29 RX ORDER — TAMSULOSIN HYDROCHLORIDE 0.4 MG/1
0.4 CAPSULE ORAL DAILY
Qty: 30 CAPSULE | Refills: 5 | Status: SHIPPED | OUTPATIENT
Start: 2019-03-29 | End: 2020-03-28

## 2019-03-29 RX ORDER — METOPROLOL SUCCINATE 50 MG/1
100 TABLET, EXTENDED RELEASE ORAL DAILY
Qty: 30 TABLET | Refills: 2
Start: 2019-03-29 | End: 2019-07-29 | Stop reason: SDUPTHER

## 2019-03-29 RX ORDER — BENAZEPRIL HYDROCHLORIDE 20 MG/1
20 TABLET ORAL DAILY
Qty: 30 TABLET | Refills: 2 | Status: SHIPPED | OUTPATIENT
Start: 2019-03-29 | End: 2019-04-04

## 2019-03-29 RX ORDER — PROMETHAZINE HYDROCHLORIDE 25 MG/1
25 TABLET ORAL 2 TIMES DAILY PRN
Qty: 60 TABLET | Refills: 0 | Status: SHIPPED | OUTPATIENT
Start: 2019-03-29 | End: 2020-06-10

## 2019-03-29 RX ORDER — SPIRONOLACTONE 50 MG/1
50 TABLET, FILM COATED ORAL DAILY
Qty: 30 TABLET | Refills: 6 | Status: SHIPPED | OUTPATIENT
Start: 2019-03-29 | End: 2020-01-02
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
## 2019-04-04 ENCOUNTER — CLINICAL SUPPORT (OUTPATIENT)
Dept: CARDIOLOGY | Facility: CLINIC | Age: 64
End: 2019-04-04
Payer: MEDICAID

## 2019-04-04 ENCOUNTER — OFFICE VISIT (OUTPATIENT)
Dept: CARDIOLOGY | Facility: CLINIC | Age: 64
End: 2019-04-04
Payer: MEDICAID

## 2019-04-04 VITALS
WEIGHT: 149.69 LBS | SYSTOLIC BLOOD PRESSURE: 186 MMHG | HEIGHT: 72 IN | DIASTOLIC BLOOD PRESSURE: 118 MMHG | BODY MASS INDEX: 20.28 KG/M2 | HEART RATE: 69 BPM

## 2019-04-04 DIAGNOSIS — I10 ESSENTIAL HYPERTENSION: ICD-10-CM

## 2019-04-04 DIAGNOSIS — R07.89 OTHER CHEST PAIN: ICD-10-CM

## 2019-04-04 DIAGNOSIS — E78.00 PURE HYPERCHOLESTEROLEMIA: ICD-10-CM

## 2019-04-04 DIAGNOSIS — I10 ESSENTIAL HYPERTENSION: Primary | ICD-10-CM

## 2019-04-04 PROCEDURE — 93005 ELECTROCARDIOGRAM TRACING: CPT | Mod: PBBFAC | Performed by: INTERNAL MEDICINE

## 2019-04-04 PROCEDURE — 99213 OFFICE O/P EST LOW 20 MIN: CPT | Mod: PBBFAC,25 | Performed by: INTERNAL MEDICINE

## 2019-04-04 PROCEDURE — 93010 EKG 12-LEAD: ICD-10-PCS | Mod: S$PBB,,, | Performed by: INTERNAL MEDICINE

## 2019-04-04 PROCEDURE — 99214 OFFICE O/P EST MOD 30 MIN: CPT | Mod: S$PBB,25,, | Performed by: INTERNAL MEDICINE

## 2019-04-04 PROCEDURE — 99214 PR OFFICE/OUTPT VISIT, EST, LEVL IV, 30-39 MIN: ICD-10-PCS | Mod: S$PBB,25,, | Performed by: INTERNAL MEDICINE

## 2019-04-04 PROCEDURE — 93010 ELECTROCARDIOGRAM REPORT: CPT | Mod: S$PBB,,, | Performed by: INTERNAL MEDICINE

## 2019-04-04 PROCEDURE — 99999 PR PBB SHADOW E&M-EST. PATIENT-LVL III: ICD-10-PCS | Mod: PBBFAC,,, | Performed by: INTERNAL MEDICINE

## 2019-04-04 PROCEDURE — 99999 PR PBB SHADOW E&M-EST. PATIENT-LVL III: CPT | Mod: PBBFAC,,, | Performed by: INTERNAL MEDICINE

## 2019-04-04 RX ORDER — BENAZEPRIL HYDROCHLORIDE 40 MG/1
40 TABLET ORAL DAILY
Qty: 30 TABLET | Refills: 2 | Status: SHIPPED | OUTPATIENT
Start: 2019-04-04 | End: 2019-07-25 | Stop reason: SDUPTHER

## 2019-04-04 RX ORDER — AMLODIPINE BESYLATE 10 MG/1
10 TABLET ORAL DAILY
Qty: 90 TABLET | Refills: 1 | Status: SHIPPED | OUTPATIENT
Start: 2019-04-04 | End: 2019-07-25 | Stop reason: SDUPTHER
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
## 2019-04-05 ENCOUNTER — TELEPHONE (OUTPATIENT)
Dept: FAMILY MEDICINE | Facility: CLINIC | Age: 64
End: 2019-04-05

## 2019-04-05 NOTE — TELEPHONE ENCOUNTER
Pharmacy called about His Rx for spironolactone 50mg which has a drug interaction with his Ace inhibitor

## 2019-04-05 NOTE — TELEPHONE ENCOUNTER
I called pharmacy ([facility]) I was left oh hold for over 30 min. I will try calling back after lunch

## 2019-04-05 NOTE — TELEPHONE ENCOUNTER
----- Message from Gerson Carrera sent at 4/5/2019  8:38 AM CDT -----  Contact: [facility]  [facility] called in regarding the attached patient and his Rx for spironolactone (ALDACTONE) 50 MG tablet which has a drug interaction with his Ace Inhibitor.        [facility] [address] [place] [id number] [address] [address]  Phone: [phone] Fax: [phone]

## 2019-04-08 NOTE — TELEPHONE ENCOUNTER
S/W pharmacy and notified the pharmacy that pt could use the medications together. Pharmacy verbalized understanding.//rf

## 2019-04-17 ENCOUNTER — CLINICAL SUPPORT (OUTPATIENT)
Dept: CARDIOLOGY | Facility: CLINIC | Age: 64
End: 2019-04-17
Attending: INTERNAL MEDICINE
Payer: MEDICAID

## 2019-04-17 DIAGNOSIS — R07.89 OTHER CHEST PAIN: ICD-10-CM

## 2019-04-17 DIAGNOSIS — I10 ESSENTIAL HYPERTENSION: ICD-10-CM

## 2019-04-17 PROCEDURE — 93306 TTE W/DOPPLER COMPLETE: CPT | Mod: PBBFAC | Performed by: INTERNAL MEDICINE

## 2019-04-17 PROCEDURE — 93306 2D ECHO WITH COLOR FLOW DOPPLER: ICD-10-PCS | Mod: 26,S$PBB,, | Performed by: INTERNAL MEDICINE

## 2019-04-18 ENCOUNTER — TELEPHONE (OUTPATIENT)
Dept: CARDIOLOGY | Facility: CLINIC | Age: 64
End: 2019-04-18

## 2019-04-18 LAB
ESTIMATED PA SYSTOLIC PRESSURE: 46.82
MITRAL VALVE REGURGITATION: ABNORMAL
RETIRED EF AND QEF - SEE NOTES: 50 (ref 55–65)
TRICUSPID VALVE REGURGITATION: ABNORMAL

## 2019-04-18 NOTE — TELEPHONE ENCOUNTER
----- Message from Chanelle Rai sent at 4/18/2019  2:04 PM CDT -----  Contact: MANN WIFE  CALLING CONCERNING NEEDING A PULMONARY SPECIALISTS.[facility] IS NOT AVAILABLE FOR NEW PATIENT WITH MEDICAID/ .PLEASE CALL WIFE @ [phone]

## 2019-04-18 NOTE — TELEPHONE ENCOUNTER
Spoke with pt's wife notified her the following information. Pt wife will call pulmonary to schedule appt.

## 2019-04-18 NOTE — TELEPHONE ENCOUNTER
----- Message from Matthew Ferguson MD sent at 4/18/2019 11:29 AM CDT -----  Please call the patient regarding his abnormal result. Stable heart function but elevated pressures in right side of heart due to Pulm HTN - refer to pulmonary for further eval and follow up as scheduled.

## 2019-04-25 ENCOUNTER — OFFICE VISIT (OUTPATIENT)
Dept: NEUROLOGY | Facility: CLINIC | Age: 64
End: 2019-04-25
Payer: MEDICARE

## 2019-04-25 ENCOUNTER — LAB VISIT (OUTPATIENT)
Dept: LAB | Facility: HOSPITAL | Age: 64
End: 2019-04-25
Attending: PSYCHIATRY & NEUROLOGY
Payer: MEDICAID

## 2019-04-25 VITALS
WEIGHT: 151 LBS | BODY MASS INDEX: 20.45 KG/M2 | HEART RATE: 80 BPM | SYSTOLIC BLOOD PRESSURE: 168 MMHG | RESPIRATION RATE: 20 BRPM | DIASTOLIC BLOOD PRESSURE: 100 MMHG | HEIGHT: 72 IN

## 2019-04-25 DIAGNOSIS — F03.90 DEMENTIA WITHOUT BEHAVIORAL DISTURBANCE, UNSPECIFIED DEMENTIA TYPE: Primary | ICD-10-CM

## 2019-04-25 DIAGNOSIS — R27.0 ATAXIA: ICD-10-CM

## 2019-04-25 DIAGNOSIS — F03.90 DEMENTIA WITHOUT BEHAVIORAL DISTURBANCE, UNSPECIFIED DEMENTIA TYPE: ICD-10-CM

## 2019-04-25 LAB
CREAT SERPL-MCNC: 1.2 MG/DL (ref 0.5–1.4)
EST. GFR  (AFRICAN AMERICAN): >60 ML/MIN/1.73 M^2
EST. GFR  (NON AFRICAN AMERICAN): >60 ML/MIN/1.73 M^2

## 2019-04-25 PROCEDURE — 99214 OFFICE O/P EST MOD 30 MIN: CPT | Mod: PBBFAC,PN | Performed by: PSYCHIATRY & NEUROLOGY

## 2019-04-25 PROCEDURE — 99999 PR PBB SHADOW E&M-EST. PATIENT-LVL IV: ICD-10-PCS | Mod: PBBFAC,,, | Performed by: PSYCHIATRY & NEUROLOGY

## 2019-04-25 PROCEDURE — 99214 PR OFFICE/OUTPT VISIT, EST, LEVL IV, 30-39 MIN: ICD-10-PCS | Mod: S$PBB,,, | Performed by: PSYCHIATRY & NEUROLOGY

## 2019-04-25 PROCEDURE — 99214 OFFICE O/P EST MOD 30 MIN: CPT | Mod: S$PBB,,, | Performed by: PSYCHIATRY & NEUROLOGY

## 2019-04-25 PROCEDURE — 82565 ASSAY OF CREATININE: CPT

## 2019-04-25 PROCEDURE — 99999 PR PBB SHADOW E&M-EST. PATIENT-LVL IV: CPT | Mod: PBBFAC,,, | Performed by: PSYCHIATRY & NEUROLOGY

## 2019-04-25 PROCEDURE — 36415 COLL VENOUS BLD VENIPUNCTURE: CPT

## 2019-05-09 PROBLEM — R07.89 ATYPICAL CHEST PAIN: Status: ACTIVE | Noted: 2019-05-09

## 2019-05-09 PROBLEM — I51.7 LVH (LEFT VENTRICULAR HYPERTROPHY): Status: ACTIVE | Noted: 2019-05-09

## 2019-05-09 PROBLEM — I27.20 PULMONARY HYPERTENSION: Status: ACTIVE | Noted: 2019-05-09

## 2019-05-13 ENCOUNTER — TELEPHONE (OUTPATIENT)
Dept: RADIOLOGY | Facility: HOSPITAL | Age: 64
End: 2019-05-13

## 2019-05-13 DIAGNOSIS — F03.90 DEMENTIA WITHOUT BEHAVIORAL DISTURBANCE, UNSPECIFIED DEMENTIA TYPE: Primary | ICD-10-CM

## 2019-05-17 ENCOUNTER — TELEPHONE (OUTPATIENT)
Dept: RADIOLOGY | Facility: HOSPITAL | Age: 64
End: 2019-05-17

## 2019-05-18 ENCOUNTER — HOSPITAL ENCOUNTER (OUTPATIENT)
Dept: RADIOLOGY | Facility: HOSPITAL | Age: 64
Discharge: HOME | End: 2019-05-18
Attending: PSYCHIATRY & NEUROLOGY
Payer: MEDICARE

## 2019-05-18 DIAGNOSIS — F03.90 DEMENTIA WITHOUT BEHAVIORAL DISTURBANCE, UNSPECIFIED DEMENTIA TYPE: ICD-10-CM

## 2019-05-18 DIAGNOSIS — R27.0 ATAXIA: ICD-10-CM

## 2019-05-18 PROCEDURE — 70553 MRI BRAIN STEM W/O & W/DYE: CPT | Mod: TC

## 2019-05-18 PROCEDURE — A9585 GADOBUTROL INJECTION: HCPCS | Performed by: PSYCHIATRY & NEUROLOGY

## 2019-05-18 PROCEDURE — 70553 MRI BRAIN STEM W/O & W/DYE: CPT | Mod: 26,,, | Performed by: RADIOLOGY

## 2019-05-18 PROCEDURE — 25500020 PHARM REV CODE 255: Performed by: PSYCHIATRY & NEUROLOGY

## 2019-05-18 PROCEDURE — 70553 MRI BRAIN W WO CONTRAST: ICD-10-PCS | Mod: 26,,, | Performed by: RADIOLOGY

## 2019-05-18 RX ORDER — GADOBUTROL 604.72 MG/ML
6.5 INJECTION INTRAVENOUS
Status: COMPLETED | OUTPATIENT
Start: 2019-05-18 | End: 2019-05-18

## 2019-05-18 RX ADMIN — GADOBUTROL 6.5 ML: 604.72 INJECTION INTRAVENOUS at 12:05

## 2019-05-31 NOTE — PROGRESS NOTES
Chief Pain Complaint:  Low Back Pain, Mid Back Pain    History of Present Illness:  This patient is a 61 y.o. male who presents today complaining of the above noted pain/s. The patient describes the pain as follows.    - duration of pain: 3 years   - timing: intermittent   - character: sharp  - radiating, dermatomal: pain extends into the right leg  - antecedent trauma, prior spinal surgery: no prior trauma, prior lumbar surgery   - pertinent negatives: No fever, No chills, No weight loss, No bladder dysfunction, No bowel dysfunction, No saddle anesthesia  - pertinent positives: right leg weakness    - medications, other therapies tried (physical therapy, injections):     >> roxicodone    >> pt is undergoing PT currently    >> Has previously undergone spinal injection/s      Imaging / Labs / Studies (reviewed on 1/19/2017):      Results for orders placed during the hospital encounter of 01/11/16   MRI Lumbar Spine Without Contrast    Narrative MRI lumbar spine without contrast.01/11/16 09:07:00  History:  M48.06 Spinal stenosis, lumbar region,   Standard multiplanar noncontrast MRI sequences of the lumbar spine.  Small spinal canal on a congenital basis.  The tip of the conus is at the L2 level.  Marked disk space narrowing at the L5-S1 level with extensive edema of the T12 endplate.  Disk space narrowing at the L4-5 level with broad-based annular tear.  T12-L1: Normal.  L1-2: Normal.  L2-3: Normal.  L3-4: Desiccation of the disk with mild circumferential disk bulge.  Mild degenerative changes of the facets.  L4-5: Desiccation of the disk with broad-based disk herniation with herniated disk material paramedian greater to the right side.  Congenitally small spinal canal.  Severe central spinal stenosis.  Marked lateral recess stenosis.  Bilateral bony neural foraminal narrowings with possible L4 nerve root impingement.  L5-S1:  Marked disk space narrowing.  Extensive edema of the L5 and S1 which could be degenerative  mucinex and delsym are both good for the cough  Lots of clear liquids  May start steroids tomorrow, shot today  Steroids may help back too  Muscle relaxer for baCK  Consider Dr Michele Ren for back injections. Warm moist to back and stretches from physical therapy. in nature.  There is however a small amount of fluid in the L5-S1 disk space and 8 L5-S1 diskitis cannot entirely be excluded.         Results for orders placed during the hospital encounter of 04/11/16   X-Ray Lumbar Spine Complete 5 View    Narrative Lumbar spine series, 5 views.  Clinical indication: Back pain.  Postop changes noted about the abdomen/pelvis.  There is moderate interspace narrowing at L5-S1 consistent with degenerative disk disease.  Mild narrowing at L4 -- L5.  Arthritic lipping noted at L4 -- L5 and L5 -- S1.  No acute lumbar fracture or significant subluxation.              Results for orders placed during the hospital encounter of 05/31/16   X-Ray Lumbar Spine AP And Lateral    Narrative Comparison: 4/11/16  Findings:2 views obtained  .Prior L3-S1 instrumented lumbar fusion. The alignment is unchanged. No hardware failure or loosening. No acute fractures identified.  No marrow replacement process.         Review of Systems:  CONSTITUTIONAL: patient denies any fever, chills, or weight loss  SKIN: patient denies any rash or itching  RESPIRATORY: patient denies having any shortness of breath  GASTROINTESTINAL: patient denies having any diarrhea, constipation, or bowel incontinence  GENITOURINARY: patient denies having any abnormal bladder function    MUSCULOSKELETAL:  - patient complains of the above noted pain/s (see chief pain complaint)    NEUROLOGICAL:   - pain as above  - strength in Lower extremities is decreased, on the RIGHT  - sensation in Lower extremities is abnormal, on the RIGHT  - patient denies any loss of bowel or bladder control      PSYCHIATRIC: patient reports feeling depressed      Physical Exam:  Visit Vitals    BP (!) 193/117    Pulse 92    Ht 6' (1.829 m)    Wt 75.8 kg (167 lb)    BMI 22.65 kg/m2     General: alert and oriented, in no apparent distress  Gait: normal gait  Skin: No rashes, No discoloration, No obvious lesions  HEENT: EOMI  Respiratory: respirations  nonlabored    Psych:  Mood and affect is appropriate      Assessment:  Chronic Pain Syndrome  Failed Back Surgery Syndrome      Plan:  Patient presents with low back pain, this is a chronic issue.  He has lumbar surgery in [place] in April 2016.  He is involved in PT currently.  He has been prescribed oxycodone by his primary care physician.  I recommend patient continue with physical therapy.  Patient's insurance would not cover spinal injections, so would be useless to pursue this.  I would not continue patient on narcotics, in particular would not continue pt on roxicodone.  Patient can follow up as needed.  Imaging / studies reviewed, detailed above.  I discussed in detail the risks, benefits, and alternatives to any and all potential treatment options.  All questions and concerns were fully addressed today in clinic.      >>Pain Disability Index:  1/19/2017 :: 103

## 2019-07-25 ENCOUNTER — OFFICE VISIT (OUTPATIENT)
Dept: NEUROLOGY | Facility: CLINIC | Age: 64
End: 2019-07-25
Payer: MEDICAID

## 2019-07-25 VITALS
DIASTOLIC BLOOD PRESSURE: 74 MMHG | WEIGHT: 146.38 LBS | RESPIRATION RATE: 18 BRPM | BODY MASS INDEX: 19.83 KG/M2 | HEIGHT: 72 IN | SYSTOLIC BLOOD PRESSURE: 142 MMHG | HEART RATE: 68 BPM

## 2019-07-25 DIAGNOSIS — K58.9 IRRITABLE BOWEL SYNDROME WITHOUT DIARRHEA: ICD-10-CM

## 2019-07-25 DIAGNOSIS — I10 ESSENTIAL HYPERTENSION: ICD-10-CM

## 2019-07-25 DIAGNOSIS — G89.29 CHRONIC LOW BACK PAIN, UNSPECIFIED BACK PAIN LATERALITY, WITH SCIATICA PRESENCE UNSPECIFIED: ICD-10-CM

## 2019-07-25 DIAGNOSIS — M48.061 SPINAL STENOSIS OF LUMBAR REGION WITH RADICULOPATHY: ICD-10-CM

## 2019-07-25 DIAGNOSIS — C64.1 MALIGNANT NEOPLASM OF RIGHT KIDNEY: ICD-10-CM

## 2019-07-25 DIAGNOSIS — E78.00 PURE HYPERCHOLESTEROLEMIA: ICD-10-CM

## 2019-07-25 DIAGNOSIS — M54.16 SPINAL STENOSIS OF LUMBAR REGION WITH RADICULOPATHY: ICD-10-CM

## 2019-07-25 DIAGNOSIS — M48.061 SPINAL STENOSIS OF LUMBAR REGION, UNSPECIFIED WHETHER NEUROGENIC CLAUDICATION PRESENT: ICD-10-CM

## 2019-07-25 DIAGNOSIS — D63.8 ANEMIA OF CHRONIC DISEASE: ICD-10-CM

## 2019-07-25 DIAGNOSIS — R00.2 PALPITATIONS: ICD-10-CM

## 2019-07-25 DIAGNOSIS — I51.7 LVH (LEFT VENTRICULAR HYPERTROPHY): ICD-10-CM

## 2019-07-25 DIAGNOSIS — I27.20 PULMONARY HYPERTENSION: ICD-10-CM

## 2019-07-25 DIAGNOSIS — G44.209 TENSION-TYPE HEADACHE, NOT INTRACTABLE, UNSPECIFIED CHRONICITY PATTERN: ICD-10-CM

## 2019-07-25 DIAGNOSIS — E53.8 VITAMIN B12 DEFICIENCY: ICD-10-CM

## 2019-07-25 DIAGNOSIS — M46.46 DISCITIS OF LUMBAR REGION: ICD-10-CM

## 2019-07-25 DIAGNOSIS — G21.4 VASCULAR PARKINSONISM: ICD-10-CM

## 2019-07-25 DIAGNOSIS — R27.0 ATAXIA: ICD-10-CM

## 2019-07-25 DIAGNOSIS — N28.1 RENAL CYST: ICD-10-CM

## 2019-07-25 DIAGNOSIS — F01.518 VASCULAR DEMENTIA WITH BEHAVIOR DISTURBANCE: Primary | ICD-10-CM

## 2019-07-25 DIAGNOSIS — Z87.828 H/O PENETRATING ABDOMINAL TRAUMA: ICD-10-CM

## 2019-07-25 DIAGNOSIS — R11.2 NAUSEA AND VOMITING, INTRACTABILITY OF VOMITING NOT SPECIFIED, UNSPECIFIED VOMITING TYPE: ICD-10-CM

## 2019-07-25 DIAGNOSIS — I16.0 HYPERTENSIVE URGENCY: ICD-10-CM

## 2019-07-25 DIAGNOSIS — M54.5 CHRONIC LOW BACK PAIN, UNSPECIFIED BACK PAIN LATERALITY, WITH SCIATICA PRESENCE UNSPECIFIED: ICD-10-CM

## 2019-07-25 DIAGNOSIS — R94.31 ABNORMAL ECG: ICD-10-CM

## 2019-07-25 DIAGNOSIS — I63.239 CEREBRAL INFARCTION DUE TO OCCLUSION OF CAROTID ARTERY, UNSPECIFIED BLOOD VESSEL LATERALITY: ICD-10-CM

## 2019-07-25 DIAGNOSIS — F41.9 ANXIETY: ICD-10-CM

## 2019-07-25 DIAGNOSIS — R93.5 ABNORMAL CT OF THE ABDOMEN: ICD-10-CM

## 2019-07-25 DIAGNOSIS — R41.3 MEMORY LOSS: ICD-10-CM

## 2019-07-25 DIAGNOSIS — G89.4 CHRONIC PAIN SYNDROME: ICD-10-CM

## 2019-07-25 DIAGNOSIS — N28.89 RENAL MASS: ICD-10-CM

## 2019-07-25 DIAGNOSIS — D63.8 ANEMIA IN CHRONIC ILLNESS: ICD-10-CM

## 2019-07-25 DIAGNOSIS — B18.2 HEP C W/O COMA, CHRONIC: ICD-10-CM

## 2019-07-25 PROBLEM — E87.5 HYPERKALEMIA: Status: RESOLVED | Noted: 2017-12-31 | Resolved: 2019-07-25

## 2019-07-25 PROBLEM — R07.9 CHEST PAIN AT REST: Status: RESOLVED | Noted: 2018-05-10 | Resolved: 2019-07-25

## 2019-07-25 PROBLEM — R07.89 ATYPICAL CHEST PAIN: Status: RESOLVED | Noted: 2019-05-09 | Resolved: 2019-07-25

## 2019-07-25 PROCEDURE — 99215 PR OFFICE/OUTPT VISIT, EST, LEVL V, 40-54 MIN: ICD-10-PCS | Mod: S$PBB,,, | Performed by: PSYCHIATRY & NEUROLOGY

## 2019-07-25 PROCEDURE — 99999 PR PBB SHADOW E&M-EST. PATIENT-LVL III: CPT | Mod: PBBFAC,,, | Performed by: PSYCHIATRY & NEUROLOGY

## 2019-07-25 PROCEDURE — 99213 OFFICE O/P EST LOW 20 MIN: CPT | Mod: PBBFAC | Performed by: PSYCHIATRY & NEUROLOGY

## 2019-07-25 PROCEDURE — 99215 OFFICE O/P EST HI 40 MIN: CPT | Mod: S$PBB,,, | Performed by: PSYCHIATRY & NEUROLOGY

## 2019-07-25 PROCEDURE — 99999 PR PBB SHADOW E&M-EST. PATIENT-LVL III: ICD-10-PCS | Mod: PBBFAC,,, | Performed by: PSYCHIATRY & NEUROLOGY

## 2019-07-25 RX ORDER — CLONIDINE HYDROCHLORIDE 0.3 MG/1
0.3 TABLET ORAL 3 TIMES DAILY
Qty: 90 TABLET | Refills: 2 | Status: SHIPPED | OUTPATIENT
Start: 2019-07-25 | End: 2019-07-29 | Stop reason: SDUPTHER

## 2019-07-25 RX ORDER — ATORVASTATIN CALCIUM 10 MG/1
10 TABLET, FILM COATED ORAL DAILY
Qty: 90 TABLET | Refills: 4 | Status: SHIPPED | OUTPATIENT
Start: 2019-07-25 | End: 2020-10-22 | Stop reason: SDUPTHER

## 2019-07-25 RX ORDER — AMLODIPINE BESYLATE 10 MG/1
10 TABLET ORAL DAILY
Qty: 90 TABLET | Refills: 1 | Status: SHIPPED | OUTPATIENT
Start: 2019-07-25 | End: 2019-07-29 | Stop reason: SDUPTHER

## 2019-07-25 RX ORDER — MEMANTINE HYDROCHLORIDE 10 MG/1
10 TABLET ORAL 2 TIMES DAILY
Qty: 60 TABLET | Refills: 5 | Status: SHIPPED | OUTPATIENT
Start: 2019-07-25 | End: 2019-07-25 | Stop reason: CLARIF

## 2019-07-25 RX ORDER — BENAZEPRIL HYDROCHLORIDE 40 MG/1
40 TABLET ORAL DAILY
Qty: 30 TABLET | Refills: 2 | Status: SHIPPED | OUTPATIENT
Start: 2019-07-25 | End: 2019-07-29 | Stop reason: SDUPTHER

## 2019-07-25 RX ORDER — DONEPEZIL HYDROCHLORIDE 10 MG/1
10 TABLET, FILM COATED ORAL NIGHTLY
Qty: 90 TABLET | Refills: 3 | Status: SHIPPED | OUTPATIENT
Start: 2019-07-25 | End: 2020-07-20 | Stop reason: SDUPTHER

## 2019-07-25 RX ORDER — MEMANTINE HYDROCHLORIDE 10 MG/1
10 TABLET ORAL 2 TIMES DAILY
Qty: 60 TABLET | Refills: 5 | Status: SHIPPED | OUTPATIENT
Start: 2019-07-25 | End: 2020-07-20 | Stop reason: SDUPTHER

## 2019-07-25 NOTE — TELEPHONE ENCOUNTER
----- Message from Jennifer Sherwood LPN sent at 7/25/2019  3:22 PM CDT -----  Pt request an appointment with Dr. Paz for medication refills.    Thanks,

## 2019-07-25 NOTE — TELEPHONE ENCOUNTER
Message left with pt wife to return call to office.     Spoke with pt states that he needs medication refills as ordered. Informed over due for f/u. Scheduled tomorrow for 5:20pm. Verbalized agreement to date and time as mentioned. Call concluded.

## 2019-07-25 NOTE — LETTER
July 25, 2019      Leona Hurst MD  [address]  [place] [address]           [facility]  [address] [place] [id number]  Phone: [phone]  Fax: [phone]          Patient: Cooper Pope Jr.   MR Number: [id number]   YOB: 1955   Date of Visit: 7/25/2019       Dear Dr. Leona Hurst:    Thank you for referring Cooper Pope to me for evaluation. Attached you will find relevant portions of my assessment and plan of care.    If you have questions, please do not hesitate to call me. I look forward to following Cooper Pope along with you.    Sincerely,    Devan Cason MD    Enclosure  CC:  No Recipients    If you would like to receive this communication electronically, please contact [email or web address] or [phone] to request more information on [employer] access.    For providers and/or their staff who would like to refer a patient to [facility], please contact us through our one-stop-shop provider referral line, [facility], at [phone].    If you feel you have received this communication in error or would no longer like to receive these types of communications, please e-mail [email or web address]
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
## 2019-07-25 NOTE — PATIENT INSTRUCTIONS
Memantine Tablets  What is this medicine?  MEMANTINE (MEM an teen) is used to treat dementia caused by Alzheimer's disease.  How should I use this medicine?  Take this medicine by mouth with a glass of water. Follow the directions on the prescription label. You may take this medicine with or without food. Take your doses at regular intervals. Do not take your medicine more often than directed. Continue to take your medicine even if you feel better. Do not stop taking except on the advice of your doctor or health care professional.  Talk to your pediatrician regarding the use of this medicine in children. Special care may be needed.  What side effects may I notice from receiving this medicine?  Side effects that you should report to your doctor or health care professional as soon as possible:  · allergic reactions like skin rash, itching or hives, swelling of the face, lips, or tongue  · agitation or a feeling of restlessness  · depressed mood  · dizziness  · hallucinations  · redness, blistering, peeling or loosening of the skin, including inside the mouth  · seizures  · vomiting  Side effects that usually do not require medical attention (report to your doctor or health care professional if they continue or are bothersome):  · constipation  · diarrhea  · headache  · nausea  · trouble sleeping  What may interact with this medicine?  · acetazolamide  · amantadine  · cimetidine  · dextromethorphan  · dofetilide  · hydrochlorothiazide  · ketamine  · metformin  · methazolamide  · quinidine  · ranitidine  · sodium bicarbonate  · triamterene  What if I miss a dose?  If you miss a dose, take it as soon as you can. If it is almost time for your next dose, take only that dose. Do not take double or extra doses. If you do not take your medicine for several days, contact your health care provider. Your dose may need to be changed.  Where should I keep my medicine?  Keep out of the reach of children.  Store at room  temperature between 15 degrees and 30 degrees C (59 degrees and 86 degrees F). Throw away any unused medicine after the expiration date.  What should I tell my health care provider before I take this medicine?  They need to know if you have any of these conditions:  · difficulty passing urine  · kidney disease  · liver disease  · seizures  · an unusual or allergic reaction to memantine, other medicines, foods, dyes, or preservatives  · pregnant or trying to get pregnant  · breast-feeding  What should I watch for while using this medicine?  Visit your doctor or health care professional for regular checks on your progress. Check with your doctor or health care professional if there is no improvement in your symptoms or if they get worse.  You may get drowsy or dizzy. Do not drive, use machinery, or do anything that needs mental alertness until you know how this drug affects you. Do not stand or sit up quickly, especially if you are an older patient. This reduces the risk of dizzy or fainting spells. Alcohol can make you more drowsy and dizzy. Avoid alcoholic drinks.  NOTE:This sheet is a summary. It may not cover all possible information. If you have questions about this medicine, talk to your doctor, pharmacist, or health care provider. Copyright© 2017 [employer]        Donepezil tablets  What is this medicine?  DONEPEZIL (forman NEP e zil) is used to treat mild to moderate dementia caused by Alzheimer's disease.  How should I use this medicine?  Take this medicine by mouth with a glass of water. Follow the directions on the prescription label. You may take this medicine with or without food. Take this medicine at regular intervals. This medicine is usually taken before bedtime. Do not take it more often than directed. Continue to take your medicine even if you feel better. Do not stop taking except on your doctor's advice.  If you are taking the 23 mg donepezil tablet, swallow it whole; do not cut, crush, or chew  it.  Talk to your pediatrician regarding the use of this medicine in children. Special care may be needed.  What side effects may I notice from receiving this medicine?  Side effects that you should report to your doctor or health care professional as soon as possible:  · allergic reactions like skin rash, itching or hives, swelling of the face, lips, or tongue  · changes in vision  · feeling faint or lightheaded, falls  · problems with balance  · redness, blistering, peeling or loosening of the skin, including inside the mouth  · slow heartbeat, or palpitations  · stomach pain  · unusual bleeding or bruising, red or purple spots on the skin  · vomiting  · weight loss  Side effects that usually do not require medical attention (report to your doctor or health care professional if they continue or are bothersome):  · diarrhea, especially when starting treatment  · headache  · indigestion or heartburn  · loss of appetite  · muscle cramps  · nausea  What may interact with this medicine?  Do not take this medicine with any of the following medications:  · certain medicines for fungal infections like itraconazole, fluconazole, posaconazole, and voriconazole  · cisapride  · dextromethorphan; quinidine  · dofetilide  · dronedarone  · pimozide  · quinidine  · thioridazine  · ziprasidone  This medicine may also interact with the following medications:  · antihistamines for allergy, cough and cold  · atropine  · bethanechol  · carbamazepine  · certain medicines for bladder problems like oxybutynin, tolterodine  · certain medicines for Parkinson's disease like benztropine, trihexyphenidyl  · certain medicines for stomach problems like dicyclomine, hyoscyamine  · certain medicines for travel sickness like scopolamine  · dexamethasone  · ipratropium  · NSAIDs, medicines for pain and inflammation, like ibuprofen or naproxen  · other medicines for Alzheimer's disease  · other medicines that prolong the QT interval (cause an  abnormal heart rhythm)  · phenobarbital  · phenytoin  · rifampin, rifabutin or rifapentine  What if I miss a dose?  If you miss a dose, take it as soon as you can. If it is almost time for your next dose, take only that dose, do not take double or extra doses.  Where should I keep my medicine?  Keep out of reach of children.  Store at room temperature between 15 and 30 degrees C (59 and 86 degrees F). Throw away any unused medicine after the expiration date.  What should I tell my health care provider before I take this medicine?  They need to know if you have any of these conditions:  · asthma or other lung disease  · difficulty passing urine  · head injury  · heart disease  · history of irregular heartbeat  · liver disease  · seizures (convulsions)  · stomach or intestinal disease, ulcers or stomach bleeding  · an unusual or allergic reaction to donepezil, other medicines, foods, dyes, or preservatives  · pregnant or trying to get pregnant  · breast-feeding  What should I watch for while using this medicine?  Visit your doctor or health care professional for regular checks on your progress. Check with your doctor or health care professional if your symptoms do not get better or if they get worse.  You may get drowsy or dizzy. Do not drive, use machinery, or do anything that needs mental alertness until you know how this drug affects you.  NOTE:This sheet is a summary. It may not cover all possible information. If you have questions about this medicine, talk to your doctor, pharmacist, or health care provider. Copyright© 2017 [employer]        Dementia and Caregiver Support  Dementia is a chronic condition that affects the brain. It causes a gradual loss of memory and higher intellectual functions. A person with dementia may have trouble recognizing familiar people and places, or knowing what day it is. The persons memory, judgment, and decision-making may also be affected. In severe cases, the person may not  respond when someone talks to him or her.  The most common form of dementia is Alzheimers disease. Doctors dont fully understand what causes AD. It has no cure. But medicines can treat some of the symptoms.  Some of the less common causes for dementia are curable. So its important to have a complete medical evaluation to look for conditions that can be treated.  Home care  These tips can help you care for a person with AD at home:  · A responsible person must be with the person who has advanced AD at all times.  He or she should not be left alone or unsupervised.  · In the case of advanced AD, keep all medicines in a secure place. They should be under the caregivers control. A person with advanced AD should not be allowed to take his or her own medicines. This needs to be supervised by the caregiver.  Here are ways to help a person with dementia:  Activities  Keep to a daily routine. Changes in routine can cause stress for someone with dementia. Make a schedule for common daily tasks. These include bathing, dressing, taking medicines, eating meals, going for walks, and going to bed.  Communication  When talking to a person with dementia, talk slowly and clearly. Use a gentle tone of voice. Choose short, simple words and sentences. Ask one question at a time. Dont interrupt, criticize, or argue. Be calm and supportive. Use friendly facial expressions. Use pointing and touching to help communicate. If the person has a loss of long-term memory, dont ask questions about past events. Instead, talk about what is happening now.  Behavioral tips  Use lists, signs, family photos, clocks, and calendars as memory aids. Label cabinets and drawers. Try to distract, not confront, the person. When he or she becomes frustrated or upset, direct the persons attention to eating or some other interesting activity.  Medical-legal tips  Talk with your doctor or  about getting a power of  for healthcare and for  financial decisions. It is best to do this while the person can still sign legal documents and make his or her own legal decisions. Otherwise, you'll need a court order.  Support for the caregiver  As the caregiver, you will need a lot of support for yourself. Caring for a person with dementia is a full-time job. It can drain your emotions and lead to frustration and anger toward the one you love. It is common to have feelings of grief over losing the relationship that you once had. As a caregiver to someone with dementia, you are at higher risk for depression, anxiety and stress.  Here are some tips to help you cope with being a caregiver:  · Learn about dementia and Alzheimers disease so you know what to expect.  · Find out about the resources in your community, including adult day-care programs. Ask your healthcare provider for a referral to a , if needed.  · Take care of yourself with a healthy diet, exercise, and plenty of rest.  · Ask for help. Share some of the caretaking duties with family and friends.  · Make personal time for yourself. This is essential! Consider hiring an in-home sitter or home health aide.  · Seek counseling or join a caregivers support group. Don't isolate yourself or try to cope with this alone. In a support group, you can learn from others in a similar situation.  · Visit the Alzheimers Association website (www.alz.org) for more information.  Follow-up care  Follow up with the persons healthcare provider, or as advised.  When to seek medical advice  Call your healthcare provider right away if any of these occur:  · Frequent falls  · The person refuses to eat or drink  · Violent behavior or behavior becomes too difficult to manage at home  · Increased drowsiness, or failure to respond normally  · Headache or nausea that gets worse, or repeated vomiting  · Numbness or weakness of the face, an arm, or a leg  · Slurred speech, trouble speaking, walking, or  seeing  · Fainting spell, dizziness, or seizure (staring spells, lip-smacking, twitching, sudden changes in mental status)  · Unexplained fever of 100.4º F (38.0º C) or higher  Date Last Reviewed: 8/1/2016  © 1138-7695 [employer]. [address] [place] [address], [place] [address]. All rights reserved. This information is not intended as a substitute for professional medical care. Always follow your healthcare professional's instructions.        Memantine Tablets  What is this medicine?  MEMANTINE (MEM an teen) is used to treat dementia caused by Alzheimer's disease.  How should I use this medicine?  Take this medicine by mouth with a glass of water. Follow the directions on the prescription label. You may take this medicine with or without food. Take your doses at regular intervals. Do not take your medicine more often than directed. Continue to take your medicine even if you feel better. Do not stop taking except on the advice of your doctor or health care professional.  Talk to your pediatrician regarding the use of this medicine in children. Special care may be needed.  What side effects may I notice from receiving this medicine?  Side effects that you should report to your doctor or health care professional as soon as possible:  · allergic reactions like skin rash, itching or hives, swelling of the face, lips, or tongue  · agitation or a feeling of restlessness  · depressed mood  · dizziness  · hallucinations  · redness, blistering, peeling or loosening of the skin, including inside the mouth  · seizures  · vomiting  Side effects that usually do not require medical attention (report to your doctor or health care professional if they continue or are bothersome):  · constipation  · diarrhea  · headache  · nausea  · trouble sleeping  What may interact with this  medicine?  · acetazolamide  · amantadine  · cimetidine  · dextromethorphan  · dofetilide  · hydrochlorothiazide  · ketamine  · metformin  · methazolamide  · quinidine  · ranitidine  · sodium bicarbonate  · triamterene  What if I miss a dose?  If you miss a dose, take it as soon as you can. If it is almost time for your next dose, take only that dose. Do not take double or extra doses. If you do not take your medicine for several days, contact your health care provider. Your dose may need to be changed.  Where should I keep my medicine?  Keep out of the reach of children.  Store at room temperature between 15 degrees and 30 degrees C (59 degrees and 86 degrees F). Throw away any unused medicine after the expiration date.  What should I tell my health care provider before I take this medicine?  They need to know if you have any of these conditions:  · difficulty passing urine  · kidney disease  · liver disease  · seizures  · an unusual or allergic reaction to memantine, other medicines, foods, dyes, or preservatives  · pregnant or trying to get pregnant  · breast-feeding  What should I watch for while using this medicine?  Visit your doctor or health care professional for regular checks on your progress. Check with your doctor or health care professional if there is no improvement in your symptoms or if they get worse.  You may get drowsy or dizzy. Do not drive, use machinery, or do anything that needs mental alertness until you know how this drug affects you. Do not stand or sit up quickly, especially if you are an older patient. This reduces the risk of dizzy or fainting spells. Alcohol can make you more drowsy and dizzy. Avoid alcoholic drinks.  NOTE:This sheet is a summary. It may not cover all possible information. If you have questions about this medicine, talk to your doctor, pharmacist, or health care provider. Copyright© 2017 [employer]        Donepezil tablets  What is this medicine?  DONEPEZIL (forman NEP e  zil) is used to treat mild to moderate dementia caused by Alzheimer's disease.  How should I use this medicine?  Take this medicine by mouth with a glass of water. Follow the directions on the prescription label. You may take this medicine with or without food. Take this medicine at regular intervals. This medicine is usually taken before bedtime. Do not take it more often than directed. Continue to take your medicine even if you feel better. Do not stop taking except on your doctor's advice.  If you are taking the 23 mg donepezil tablet, swallow it whole; do not cut, crush, or chew it.  Talk to your pediatrician regarding the use of this medicine in children. Special care may be needed.  What side effects may I notice from receiving this medicine?  Side effects that you should report to your doctor or health care professional as soon as possible:  · allergic reactions like skin rash, itching or hives, swelling of the face, lips, or tongue  · changes in vision  · feeling faint or lightheaded, falls  · problems with balance  · redness, blistering, peeling or loosening of the skin, including inside the mouth  · slow heartbeat, or palpitations  · stomach pain  · unusual bleeding or bruising, red or purple spots on the skin  · vomiting  · weight loss  Side effects that usually do not require medical attention (report to your doctor or health care professional if they continue or are bothersome):  · diarrhea, especially when starting treatment  · headache  · indigestion or heartburn  · loss of appetite  · muscle cramps  · nausea  What may interact with this medicine?  Do not take this medicine with any of the following medications:  · certain medicines for fungal infections like itraconazole, fluconazole, posaconazole, and voriconazole  · cisapride  · dextromethorphan; quinidine  · dofetilide  · dronedarone  · pimozide  · quinidine  · thioridazine  · ziprasidone  This medicine may also interact with the following  medications:  · antihistamines for allergy, cough and cold  · atropine  · bethanechol  · carbamazepine  · certain medicines for bladder problems like oxybutynin, tolterodine  · certain medicines for Parkinson's disease like benztropine, trihexyphenidyl  · certain medicines for stomach problems like dicyclomine, hyoscyamine  · certain medicines for travel sickness like scopolamine  · dexamethasone  · ipratropium  · NSAIDs, medicines for pain and inflammation, like ibuprofen or naproxen  · other medicines for Alzheimer's disease  · other medicines that prolong the QT interval (cause an abnormal heart rhythm)  · phenobarbital  · phenytoin  · rifampin, rifabutin or rifapentine  What if I miss a dose?  If you miss a dose, take it as soon as you can. If it is almost time for your next dose, take only that dose, do not take double or extra doses.  Where should I keep my medicine?  Keep out of reach of children.  Store at room temperature between 15 and 30 degrees C (59 and 86 degrees F). Throw away any unused medicine after the expiration date.  What should I tell my health care provider before I take this medicine?  They need to know if you have any of these conditions:  · asthma or other lung disease  · difficulty passing urine  · head injury  · heart disease  · history of irregular heartbeat  · liver disease  · seizures (convulsions)  · stomach or intestinal disease, ulcers or stomach bleeding  · an unusual or allergic reaction to donepezil, other medicines, foods, dyes, or preservatives  · pregnant or trying to get pregnant  · breast-feeding  What should I watch for while using this medicine?  Visit your doctor or health care professional for regular checks on your progress. Check with your doctor or health care professional if your symptoms do not get better or if they get worse.  You may get drowsy or dizzy. Do not drive, use machinery, or do anything that needs mental alertness until you know how this drug affects  you.  NOTE:This sheet is a summary. It may not cover all possible information. If you have questions about this medicine, talk to your doctor, pharmacist, or health care provider. Copyright© 2017 [employer]        Dementia and Caregiver Support  Dementia is a chronic condition that affects the brain. It causes a gradual loss of memory and higher intellectual functions. A person with dementia may have trouble recognizing familiar people and places, or knowing what day it is. The persons memory, judgment, and decision-making may also be affected. In severe cases, the person may not respond when someone talks to him or her.  The most common form of dementia is Alzheimers disease. Doctors dont fully understand what causes AD. It has no cure. But medicines can treat some of the symptoms.  Some of the less common causes for dementia are curable. So its important to have a complete medical evaluation to look for conditions that can be treated.  Home care  These tips can help you care for a person with AD at home:  · A responsible person must be with the person who has advanced AD at all times.  He or she should not be left alone or unsupervised.  · In the case of advanced AD, keep all medicines in a secure place. They should be under the caregivers control. A person with advanced AD should not be allowed to take his or her own medicines. This needs to be supervised by the caregiver.  Here are ways to help a person with dementia:  Activities  Keep to a daily routine. Changes in routine can cause stress for someone with dementia. Make a schedule for common daily tasks. These include bathing, dressing, taking medicines, eating meals, going for walks, and going to bed.  Communication  When talking to a person with dementia, talk slowly and clearly. Use a gentle tone of voice. Choose short, simple words and sentences. Ask one question at a time. Dont interrupt, criticize, or argue. Be calm and supportive. Use friendly  facial expressions. Use pointing and touching to help communicate. If the person has a loss of long-term memory, dont ask questions about past events. Instead, talk about what is happening now.  Behavioral tips  Use lists, signs, family photos, clocks, and calendars as memory aids. Label cabinets and drawers. Try to distract, not confront, the person. When he or she becomes frustrated or upset, direct the persons attention to eating or some other interesting activity.  Medical-legal tips  Talk with your doctor or  about getting a power of  for healthcare and for financial decisions. It is best to do this while the person can still sign legal documents and make his or her own legal decisions. Otherwise, you'll need a court order.  Support for the caregiver  As the caregiver, you will need a lot of support for yourself. Caring for a person with dementia is a full-time job. It can drain your emotions and lead to frustration and anger toward the one you love. It is common to have feelings of grief over losing the relationship that you once had. As a caregiver to someone with dementia, you are at higher risk for depression, anxiety and stress.  Here are some tips to help you cope with being a caregiver:  · Learn about dementia and Alzheimers disease so you know what to expect.  · Find out about the resources in your community, including adult day-care programs. Ask your healthcare provider for a referral to a , if needed.  · Take care of yourself with a healthy diet, exercise, and plenty of rest.  · Ask for help. Share some of the caretaking duties with family and friends.  · Make personal time for yourself. This is essential! Consider hiring an in-home sitter or home health aide.  · Seek counseling or join a caregivers support group. Don't isolate yourself or try to cope with this alone. In a support group, you can learn from others in a similar situation.  · Visit the Alzheimers  Association website (www.alz.org) for more information.  Follow-up care  Follow up with the persons healthcare provider, or as advised.  When to seek medical advice  Call your healthcare provider right away if any of these occur:  · Frequent falls  · The person refuses to eat or drink  · Violent behavior or behavior becomes too difficult to manage at home  · Increased drowsiness, or failure to respond normally  · Headache or nausea that gets worse, or repeated vomiting  · Numbness or weakness of the face, an arm, or a leg  · Slurred speech, trouble speaking, walking, or seeing  · Fainting spell, dizziness, or seizure (staring spells, lip-smacking, twitching, sudden changes in mental status)  · Unexplained fever of 100.4º F (38.0º C) or higher  Date Last Reviewed: 8/1/2016  © 3712-6741 [employer]. [address] [place] [address], [place] [address]. All rights reserved. This information is not intended as a substitute for professional medical care. Always follow your healthcare professional's instructions.

## 2019-07-29 ENCOUNTER — OFFICE VISIT (OUTPATIENT)
Dept: FAMILY MEDICINE | Facility: CLINIC | Age: 64
End: 2019-07-29
Payer: MEDICARE

## 2019-07-29 ENCOUNTER — TELEPHONE (OUTPATIENT)
Dept: FAMILY MEDICINE | Facility: CLINIC | Age: 64
End: 2019-07-29

## 2019-07-29 VITALS
HEIGHT: 72 IN | SYSTOLIC BLOOD PRESSURE: 165 MMHG | DIASTOLIC BLOOD PRESSURE: 110 MMHG | OXYGEN SATURATION: 99 % | BODY MASS INDEX: 19.59 KG/M2 | TEMPERATURE: 99 F | HEART RATE: 95 BPM | WEIGHT: 144.63 LBS

## 2019-07-29 DIAGNOSIS — R63.4 WEIGHT LOSS: ICD-10-CM

## 2019-07-29 DIAGNOSIS — I16.0 HYPERTENSIVE URGENCY: ICD-10-CM

## 2019-07-29 DIAGNOSIS — Z12.11 SCREENING FOR COLON CANCER: ICD-10-CM

## 2019-07-29 DIAGNOSIS — G89.4 CHRONIC PAIN SYNDROME: ICD-10-CM

## 2019-07-29 DIAGNOSIS — F01.518 VASCULAR DEMENTIA WITH BEHAVIOR DISTURBANCE: ICD-10-CM

## 2019-07-29 DIAGNOSIS — N40.0 BENIGN PROSTATIC HYPERPLASIA, UNSPECIFIED WHETHER LOWER URINARY TRACT SYMPTOMS PRESENT: ICD-10-CM

## 2019-07-29 DIAGNOSIS — Z12.5 SCREENING FOR PROSTATE CANCER: ICD-10-CM

## 2019-07-29 DIAGNOSIS — I27.20 PULMONARY HTN: ICD-10-CM

## 2019-07-29 DIAGNOSIS — M48.061 SPINAL STENOSIS OF LUMBAR REGION WITHOUT NEUROGENIC CLAUDICATION: ICD-10-CM

## 2019-07-29 DIAGNOSIS — I10 ESSENTIAL HYPERTENSION: Primary | ICD-10-CM

## 2019-07-29 DIAGNOSIS — E78.00 PURE HYPERCHOLESTEROLEMIA: ICD-10-CM

## 2019-07-29 PROCEDURE — 99214 PR OFFICE/OUTPT VISIT, EST, LEVL IV, 30-39 MIN: ICD-10-PCS | Mod: S$PBB,,, | Performed by: FAMILY MEDICINE

## 2019-07-29 PROCEDURE — 99999 PR PBB SHADOW E&M-EST. PATIENT-LVL IV: CPT | Mod: PBBFAC,,, | Performed by: FAMILY MEDICINE

## 2019-07-29 PROCEDURE — 99214 OFFICE O/P EST MOD 30 MIN: CPT | Mod: PBBFAC,PO | Performed by: FAMILY MEDICINE

## 2019-07-29 PROCEDURE — 99999 PR PBB SHADOW E&M-EST. PATIENT-LVL IV: ICD-10-PCS | Mod: PBBFAC,,, | Performed by: FAMILY MEDICINE

## 2019-07-29 PROCEDURE — 99214 OFFICE O/P EST MOD 30 MIN: CPT | Mod: S$PBB,,, | Performed by: FAMILY MEDICINE

## 2019-07-29 RX ORDER — METOPROLOL SUCCINATE 50 MG/1
100 TABLET, EXTENDED RELEASE ORAL DAILY
Qty: 30 TABLET | Refills: 2 | Status: ON HOLD
Start: 2019-07-29 | End: 2020-07-28

## 2019-07-29 RX ORDER — OXYCODONE HYDROCHLORIDE 30 MG/1
30 TABLET ORAL EVERY 12 HOURS PRN
Qty: 20 TABLET | Refills: 0 | Status: SHIPPED | OUTPATIENT
Start: 2019-07-29 | End: 2019-08-18

## 2019-07-29 RX ORDER — SODIUM, POTASSIUM,MAG SULFATES 17.5-3.13G
1 SOLUTION, RECONSTITUTED, ORAL ORAL DAILY
Qty: 1 KIT | Refills: 0 | Status: SHIPPED | OUTPATIENT
Start: 2019-07-29 | End: 2019-07-31

## 2019-07-29 RX ORDER — BENAZEPRIL HYDROCHLORIDE 40 MG/1
40 TABLET ORAL DAILY
Qty: 30 TABLET | Refills: 2 | Status: SHIPPED | OUTPATIENT
Start: 2019-07-29 | End: 2020-10-22

## 2019-07-29 RX ORDER — AMLODIPINE BESYLATE 10 MG/1
10 TABLET ORAL DAILY
Qty: 90 TABLET | Refills: 1 | Status: SHIPPED | OUTPATIENT
Start: 2019-07-29 | End: 2021-02-22

## 2019-07-29 RX ORDER — HYDROCHLOROTHIAZIDE 12.5 MG/1
12.5 TABLET ORAL DAILY
Qty: 30 TABLET | Refills: 2 | Status: ON HOLD | OUTPATIENT
Start: 2019-07-29 | End: 2020-07-28

## 2019-07-29 RX ORDER — CLONIDINE HYDROCHLORIDE 0.3 MG/1
0.3 TABLET ORAL 3 TIMES DAILY
Qty: 90 TABLET | Refills: 2 | Status: SHIPPED | OUTPATIENT
Start: 2019-07-29 | End: 2020-01-06 | Stop reason: SDUPTHER

## 2019-07-29 NOTE — TELEPHONE ENCOUNTER
----- Message from Isidoro Sosa sent at 7/29/2019 11:10 AM CDT -----  Contact: pt   Pt states that he was told to come in on 7/29 for his appt that was scheduled on 7/26. Pt states spoke with someone at registration desk and was told he was being ami. For today. pls return call.           ..[phone]
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
## 2019-07-29 NOTE — TELEPHONE ENCOUNTER
Returned call to pt states he was supposed to be scheduled today and was not. Able to get pt in to be seen today at 5:20pm. States he will be on time for today's appt. Call concluded.

## 2019-07-30 ENCOUNTER — TELEPHONE (OUTPATIENT)
Dept: FAMILY MEDICINE | Facility: CLINIC | Age: 64
End: 2019-07-30

## 2019-07-30 NOTE — TELEPHONE ENCOUNTER
----- Message from Ghazal Paz MD sent at 7/29/2019  6:32 PM CDT -----  Please make sure prescription for pain referral gets sent to [place] pain specialists - should be in printer

## 2019-08-01 ENCOUNTER — TELEPHONE (OUTPATIENT)
Dept: ENDOSCOPY | Facility: HOSPITAL | Age: 64
End: 2019-08-01

## 2019-08-02 ENCOUNTER — TELEPHONE (OUTPATIENT)
Dept: ENDOSCOPY | Facility: HOSPITAL | Age: 64
End: 2019-08-02

## 2019-08-02 NOTE — TELEPHONE ENCOUNTER
Endoscopy Scheduling Questionnaire:    1. Have you been admitted overnight to the hospital in the past 3 months? no  2. Do you get chest pain and SOB while walking up a flight of stairs? no  3. Have you had a cardiac stent placed in the past 12 months? no  4. Have you had a stroke or heart attack in the past 6 months? no  5. Have you had any chest pain in the past 3 months? no      If so, have you been evaluated by your PCP or Cardiologist? no  6. Do you take medication for weight loss? no  7. Have you been diagnosed with Diverticulitis within the past 3 months? no  8. Are you having any GI symptoms that you feel need to be evaluated prior to your procedure? no  9. Are you on dialysis? no  10. Are you diabetic? no  11. Do you have any other health issues that you feel might limit your ability to safely have the procedure and/or sedation? no  12. Is the patient over 81 yo? no        If so, has the patient been seen by their PCP or GI in the last 3 months? N/A       -I have reviewed the last colonoscopy for recommendations regarding surveillance and bowel prep  Yes  -I have reviewed the patient's medications and allergies. He is not on high risk medication, A clearance request NA  -I have verified the pharmacy information. The prep being used is Miralax. The patient's prep instructions were sent via [facility] patient portal..    Date Endoscopy Scheduled: Date: 8-

## 2019-08-20 ENCOUNTER — HOSPITAL ENCOUNTER (OUTPATIENT)
Facility: HOSPITAL | Age: 64
Discharge: HOME | End: 2019-08-20
Attending: FAMILY MEDICINE | Admitting: FAMILY MEDICINE
Payer: MEDICARE

## 2019-08-20 ENCOUNTER — ANESTHESIA EVENT (OUTPATIENT)
Dept: ENDOSCOPY | Facility: HOSPITAL | Age: 64
End: 2019-08-20
Payer: MEDICARE

## 2019-08-20 ENCOUNTER — ANESTHESIA (OUTPATIENT)
Dept: ENDOSCOPY | Facility: HOSPITAL | Age: 64
End: 2019-08-20
Payer: MEDICARE

## 2019-08-20 VITALS
HEART RATE: 74 BPM | TEMPERATURE: 98 F | RESPIRATION RATE: 20 BRPM | DIASTOLIC BLOOD PRESSURE: 98 MMHG | SYSTOLIC BLOOD PRESSURE: 154 MMHG | BODY MASS INDEX: 19.5 KG/M2 | HEIGHT: 72 IN | OXYGEN SATURATION: 96 % | WEIGHT: 144 LBS

## 2019-08-20 DIAGNOSIS — Z12.11 SPECIAL SCREENING FOR MALIGNANT NEOPLASMS, COLON: ICD-10-CM

## 2019-08-20 DIAGNOSIS — K57.30 DIVERTICULOSIS OF COLON: ICD-10-CM

## 2019-08-20 DIAGNOSIS — K63.5 POLYP OF COLON, UNSPECIFIED PART OF COLON, UNSPECIFIED TYPE: ICD-10-CM

## 2019-08-20 DIAGNOSIS — Z12.11 COLON CANCER SCREENING: Primary | ICD-10-CM

## 2019-08-20 PROCEDURE — 27201089 HC SNARE, DISP (ANY): Performed by: FAMILY MEDICINE

## 2019-08-20 PROCEDURE — 37000009 HC ANESTHESIA EA ADD 15 MINS: Performed by: FAMILY MEDICINE

## 2019-08-20 PROCEDURE — 45385 COLONOSCOPY W/LESION REMOVAL: CPT | Performed by: FAMILY MEDICINE

## 2019-08-20 PROCEDURE — 25000003 PHARM REV CODE 250: Performed by: NURSE ANESTHETIST, CERTIFIED REGISTERED

## 2019-08-20 PROCEDURE — 88305 TISSUE SPECIMEN TO PATHOLOGY - SURGERY: ICD-10-PCS | Mod: 26,,, | Performed by: PATHOLOGY

## 2019-08-20 PROCEDURE — 88305 TISSUE EXAM BY PATHOLOGIST: CPT | Mod: 26,,, | Performed by: PATHOLOGY

## 2019-08-20 PROCEDURE — 45385 PR COLONOSCOPY,REMV LESN,SNARE: ICD-10-PCS | Mod: PT,,, | Performed by: FAMILY MEDICINE

## 2019-08-20 PROCEDURE — 45385 COLONOSCOPY W/LESION REMOVAL: CPT | Mod: PT,,, | Performed by: FAMILY MEDICINE

## 2019-08-20 PROCEDURE — 37000008 HC ANESTHESIA 1ST 15 MINUTES: Performed by: FAMILY MEDICINE

## 2019-08-20 PROCEDURE — 63600175 PHARM REV CODE 636 W HCPCS: Performed by: NURSE ANESTHETIST, CERTIFIED REGISTERED

## 2019-08-20 PROCEDURE — 63600175 PHARM REV CODE 636 W HCPCS: Performed by: FAMILY MEDICINE

## 2019-08-20 PROCEDURE — 88305 TISSUE EXAM BY PATHOLOGIST: CPT | Mod: 59 | Performed by: PATHOLOGY

## 2019-08-20 PROCEDURE — 27200997: Performed by: FAMILY MEDICINE

## 2019-08-20 RX ORDER — SODIUM CHLORIDE, SODIUM LACTATE, POTASSIUM CHLORIDE, CALCIUM CHLORIDE 600; 310; 30; 20 MG/100ML; MG/100ML; MG/100ML; MG/100ML
INJECTION, SOLUTION INTRAVENOUS CONTINUOUS
Status: DISCONTINUED | OUTPATIENT
Start: 2019-08-20 | End: 2019-08-20 | Stop reason: HOSPADM

## 2019-08-20 RX ORDER — PROPOFOL 10 MG/ML
VIAL (ML) INTRAVENOUS
Status: DISCONTINUED | OUTPATIENT
Start: 2019-08-20 | End: 2019-08-20

## 2019-08-20 RX ORDER — SODIUM CHLORIDE 0.9 % (FLUSH) 0.9 %
10 SYRINGE (ML) INJECTION
Status: DISCONTINUED | OUTPATIENT
Start: 2019-08-20 | End: 2019-08-20 | Stop reason: HOSPADM

## 2019-08-20 RX ORDER — LIDOCAINE HYDROCHLORIDE 10 MG/ML
INJECTION, SOLUTION EPIDURAL; INFILTRATION; INTRACAUDAL; PERINEURAL
Status: DISCONTINUED | OUTPATIENT
Start: 2019-08-20 | End: 2019-08-20

## 2019-08-20 RX ADMIN — PROPOFOL 50 MG: 10 INJECTION, EMULSION INTRAVENOUS at 11:08

## 2019-08-20 RX ADMIN — PROPOFOL 50 MG: 10 INJECTION, EMULSION INTRAVENOUS at 10:08

## 2019-08-20 RX ADMIN — LIDOCAINE HYDROCHLORIDE 50 MG: 10 INJECTION, SOLUTION EPIDURAL; INFILTRATION; INTRACAUDAL; PERINEURAL at 10:08

## 2019-08-20 RX ADMIN — SODIUM CHLORIDE, SODIUM LACTATE, POTASSIUM CHLORIDE, AND CALCIUM CHLORIDE: .6; .31; .03; .02 INJECTION, SOLUTION INTRAVENOUS at 10:08
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
## 2019-08-20 NOTE — PLAN OF CARE
D/C instructions given to pt and wife. Demonstrated understanding by teach back method. D/C criteria met. Denies pain and nausea. Pt dressed. Pt taken down to awaiting vehicle via wheelchair.

## 2019-08-20 NOTE — ANESTHESIA POSTPROCEDURE EVALUATION
Anesthesia Post Evaluation    Patient: Cooper Pope Jr.    Procedure(s) Performed: Procedure(s) (LRB):  COLONOSCOPY (N/A)    Final Anesthesia Type: MAC  Patient location during evaluation: PACU  Patient participation: Yes- Able to Participate  Level of consciousness: awake and alert and confused  Post-procedure vital signs: reviewed and stable  Pain management: adequate  Airway patency: patent  PONV status at discharge: No PONV  Anesthetic complications: no      Cardiovascular status: blood pressure returned to baseline and hemodynamically stable  Respiratory status: unassisted, spontaneous ventilation and room air  Hydration status: euvolemic  Follow-up not needed.          Vitals Value Taken Time   /102 8/20/2019 11:22 AM   Temp 36.8 °C (98.2 °F) 8/20/2019 11:22 AM   Pulse 75 8/20/2019 11:22 AM   Resp 18 8/20/2019 11:22 AM   SpO2 98 % 8/20/2019 11:22 AM         No case tracking events are documented in the log.      Pain/Say Score: Say Score: 9 (8/20/2019 11:22 AM)

## 2019-08-20 NOTE — ANESTHESIA RELEASE NOTE
Anesthesia Release from PACU Note    Patient: Cooper Pope Jr.    Procedure(s) Performed: Procedure(s) (LRB):  COLONOSCOPY (N/A)    Anesthesia type: MAC    Post pain: Adequate analgesia    Post assessment: no apparent anesthetic complications, tolerated procedure well and no evidence of recall    Last Vitals:   Visit Vitals  BP (!) 137/102 (BP Location: Left arm, Patient Position: Lying)   Pulse 75   Temp 36.8 °C (98.2 °F) (Temporal)   Resp 18   Ht 6' (1.829 m)   Wt 65.3 kg (144 lb)   SpO2 98%   BMI 19.53 kg/m²       Post vital signs: stable    Level of consciousness: awake    Nausea/Vomiting: no nausea/no vomiting    Complications: none    Airway Patency: patent    Respiratory: unassisted, spontaneous ventilation, room air    Cardiovascular: stable and blood pressure at baseline    Hydration: euvolemic

## 2019-08-20 NOTE — [employer] PATIENT INSTRUCTIONS
Discharge Summary/Instructions after an Endoscopic Procedure  Patient Name: Cooper Pope  Patient MRN: [id number]  Patient YOB: 1955 Tuesday, August 20, 2019 Antione Coronel MD  RESTRICTIONS:  During your procedure today, you received medications for sedation.  These   medications may affect your judgment, balance and coordination.  Therefore,   for 24 hours, you have the following restrictions:   - DO NOT drive a car, operate machinery, make legal/financial decisions,   sign important papers or drink alcohol.    ACTIVITY:  Today: no heavy lifting, straining or running due to procedural   sedation/anesthesia.  The following day: return to full activity including work.  DIET:  Eat and drink normally unless instructed otherwise.     TREATMENT FOR COMMON SIDE EFFECTS:  - Mild abdominal pain, nausea, belching, bloating or excessive gas:  rest,   eat lightly and use a heating pad.  - Sore Throat: treat with throat lozenges and/or gargle with warm salt   water.  - Because air was used during the procedure, expelling large amounts of air   from your rectum or belching is normal.  - If a bowel prep was taken, you may not have a bowel movement for 1-3 days.    This is normal.  SYMPTOMS TO WATCH FOR AND REPORT TO YOUR PHYSICIAN:  1. Abdominal pain or bloating, other than gas cramps.  2. Chest pain.  3. Back pain.  4. Signs of infection such as: chills or fever occurring within 24 hours   after the procedure.  5. Rectal bleeding, which would show as bright red, maroon, or black stools.   (A tablespoon of blood from the rectum is not serious, especially if   hemorrhoids are present.)  6. Vomiting.  7. Weakness or dizziness.  GO DIRECTLY TO THE NEAREST EMERGENCY ROOM IF YOU HAVE ANY OF THE FOLLOWING:      Difficulty breathing              Chills and/or fever over 101 F   Persistent vomiting and/or vomiting blood   Severe abdominal pain   Severe chest pain   Black, tarry stools   Bleeding- more than one  tablespoon   Any other symptom or condition that you feel may need urgent attention  Your doctor recommends these additional instructions:  If any biopsies were taken, your doctors clinic will contact you in 1 to 2   weeks with any results.  - Patient has a contact number available for emergencies.  The signs and   symptoms of potential delayed complications were discussed with the   patient.  Return to normal activities tomorrow.  Written discharge   instructions were provided to the patient.   - Resume previous diet.   - Continue present medications.   - Await pathology results.   - Repeat colonoscopy in 3 years for surveillance.   - Telephone my office for pathology results in 1 week.   - Discharge patient to home (via wheelchair).  For questions, problems or results please call your physician Antione Coronel MD at Work:  [phone]  If you have any questions about the above instructions, call the GI   department at [phone] or call the endoscopy unit at [phone]   from 7am until 3 pm.  [facility] [place], EMERGENCY ROOM PHONE NUMBER:   [phone]  IF A COMPLICATION OR EMERGENCY SITUATION ARISES AND YOU ARE UNABLE TO REACH   YOUR PHYSICIAN - GO DIRECTLY TO THE EMERGENCY ROOM.  I have read or have had read to me these discharge instructions for my   procedure and have received a written copy.  I understand these   instructions and will follow-up with my physician if I have any questions.     __________________________________       _____________________________________  Nurse Signature                                          Patient/Designated   Responsible Party Signature  Antione Coronel MD  8/20/2019 11:20:05 AM  This report has been verified and signed electronically.  [employer]

## 2019-08-20 NOTE — DISCHARGE SUMMARY
Endoscopy Discharge Summary      Admit Date: 8/20/2019    Discharge Date and Time:  8/20/2019 11:24 AM    Attending Physician: Antione Coronel MD     Discharge Physician: Antione Coronel MD     Principal Admitting Diagnoses: Colon cancer screening         Discharge Diagnosis: The primary encounter diagnosis was Colon cancer screening. Diagnoses of Special screening for malignant neoplasms, colon, Polyp of colon, unspecified part of colon, unspecified type, and Diverticulosis of colon were also pertinent to this visit.     Discharged Condition: Good    Indication for Admission: Colon cancer screening     Hospital Course: Patient was admitted for an inpatient procedure and tolerated the procedure well with no complications.    Significant Diagnostic Studies: Colonoscopy with cold snare polypectomy and Colonoscopy with hot snare polypectomy    Pathology (if any):  Specimen (12h ago, onward)    Start     Ordered    08/20/19 1105  Specimen to Pathology - Surgery  Once     Comments:  1.  Cecal polyp2.  Sigmoid colon polyp     Start Status     08/20/19 1105 Collected (08/20/19 1122) Order ID: [id number]       08/20/19 1119          Estimated Blood Loss: 1 ml.    Discussed with: patient and family.    Disposition: Home.    Follow Up/Patient Instructions:   Current Discharge Medication List      CONTINUE these medications which have NOT CHANGED    Details   amLODIPine (NORVASC) 10 MG tablet Take 1 tablet (10 mg total) by mouth once daily.  Qty: 90 tablet, Refills: 1      atorvastatin (LIPITOR) 10 MG tablet Take 1 tablet (10 mg total) by mouth once daily.  Qty: 90 tablet, Refills: 4      benazepril (LOTENSIN) 40 MG tablet Take 1 tablet (40 mg total) by mouth once daily.  Qty: 30 tablet, Refills: 2      cyanocobalamin (VITAMIN B-12) 1,000 mcg/mL injection Inject 1 mL (1,000 mcg total) into the muscle every 30 days.  Qty: 10 mL, Refills: 5    Associated Diagnoses: Vitamin B12 deficiency      cyproheptadine (PERIACTIN) 4 mg  tablet Take 1 tablet (4 mg total) by mouth 3 (three) times daily as needed (for appetite).  Qty: 90 tablet, Refills: 2      donepezil (ARICEPT) 10 MG tablet Take 1 tablet (10 mg total) by mouth every evening.  Qty: 90 tablet, Refills: 3      hydroCHLOROthiazide (HYDRODIURIL) 12.5 MG Tab Take 1 tablet (12.5 mg total) by mouth once daily.  Qty: 30 tablet, Refills: 2      memantine (NAMENDA) 10 MG Tab Take 1 tablet (10 mg total) by mouth 2 (two) times daily.  Qty: 60 tablet, Refills: 5      metoprolol succinate (TOPROL-XL) 50 MG 24 hr tablet Take 2 tablets (100 mg total) by mouth once daily.  Qty: 30 tablet, Refills: 2    Associated Diagnoses: Essential hypertension      promethazine (PHENERGAN) 25 MG tablet Take 1 tablet (25 mg total) by mouth 2 (two) times daily as needed for Nausea.  Qty: 60 tablet, Refills: 0    Associated Diagnoses: Nausea      tamsulosin (FLOMAX) 0.4 mg Cap Take 1 capsule (0.4 mg total) by mouth once daily.  Qty: 30 capsule, Refills: 5      aspirin (ECOTRIN) 81 MG EC tablet Take 1 tablet (81 mg total) by mouth once daily.  Qty: 30 tablet, Refills: 0      blood pressure monitor (BLOOD PRESSURE KIT) Kit 1 Units by Misc.(Non-Drug; Combo Route) route once daily.  Qty: 1 each, Refills: 0    Associated Diagnoses: Hypertensive urgency      cloNIDine (CATAPRES) 0.3 MG tablet Take 1 tablet (0.3 mg total) by mouth 3 (three) times daily.  Qty: 90 tablet, Refills: 2    Comments: LAST REFILL//OVERDUE FOR ANNUAL  Associated Diagnoses: Hypertensive urgency      multivit-iron-FA-calcium-mins 9 mg iron-400 mcg Tab tablet Take 1 tablet by mouth once daily.  Qty: 30 tablet, Refills: 0      spironolactone (ALDACTONE) 50 MG tablet Take 1 tablet (50 mg total) by mouth once daily.  Qty: 30 tablet, Refills: 6             Discharge Procedure Orders   Diet general     Call MD for:  temperature >100.4     Call MD for:  persistent nausea and vomiting     Call MD for:  severe uncontrolled pain     Call MD for:  difficulty  breathing, headache or visual disturbances     Call MD for:  redness, tenderness, or signs of infection (pain, swelling, redness, odor or green/yellow discharge around incision site)     Call MD for:  hives     Call MD for:  persistent dizziness or light-headedness     No dressing needed

## 2019-08-20 NOTE — ANESTHESIA PREPROCEDURE EVALUATION
08/20/2019  Cooper Pope Jr. is a 63 y.o., male.    Anesthesia Evaluation    I have reviewed the Patient Summary Reports.    I have reviewed the Nursing Notes.   I have reviewed the Medications.     Review of Systems  Cardiovascular:   Hypertension, poorly controlled    Renal/:   Chronic Renal Disease    Hepatic/GI:   Liver Disease, Hepatitis    Musculoskeletal:   Arthritis     Neurological:   TIA, Denies CVA. Headaches  Dementia mild    Psych:   Psychiatric History          Physical Exam  General:  Well nourished                 Anesthesia Plan  Type of Anesthesia, risks & benefits discussed:  Anesthesia Type:  MAC  Patient's Preference:   Intra-op Monitoring Plan:   Intra-op Monitoring Plan Comments:   Post Op Pain Control Plan: IV/PO Opioids PRN  Post Op Pain Control Plan Comments:   Induction:   IV  Beta Blocker:  Patient is on a Beta-Blocker and has received one dose within the past 24 hours (No further documentation required).       Informed Consent: Patient understands risks and agrees with Anesthesia plan.  Questions answered. Anesthesia consent signed with patient representative.  ASA Score: 3     Day of Surgery Review of History & Physical: I have interviewed and examined the patient. I have reviewed the patient's H&P dated:  There are no significant changes.          Ready For Surgery From Anesthesia Perspective.

## 2019-08-20 NOTE — PLAN OF CARE
MD at bedside. Discussed procedure results and plan of care with patient and family. Vital signs stable. Will cont to monitor.

## 2019-08-20 NOTE — DISCHARGE INSTRUCTIONS

## 2020-01-02 ENCOUNTER — HOSPITAL ENCOUNTER (EMERGENCY)
Facility: HOSPITAL | Age: 65
Discharge: HOME | End: 2020-01-02
Attending: EMERGENCY MEDICINE
Payer: MEDICARE

## 2020-01-02 ENCOUNTER — TELEPHONE (OUTPATIENT)
Dept: FAMILY MEDICINE | Facility: CLINIC | Age: 65
End: 2020-01-02

## 2020-01-02 VITALS
RESPIRATION RATE: 18 BRPM | SYSTOLIC BLOOD PRESSURE: 184 MMHG | HEART RATE: 80 BPM | HEIGHT: 72 IN | OXYGEN SATURATION: 100 % | WEIGHT: 134 LBS | TEMPERATURE: 98 F | BODY MASS INDEX: 18.15 KG/M2 | DIASTOLIC BLOOD PRESSURE: 111 MMHG

## 2020-01-02 DIAGNOSIS — R07.9 CHEST PAIN: Primary | ICD-10-CM

## 2020-01-02 DIAGNOSIS — R59.0 INGUINAL LYMPHADENOPATHY: ICD-10-CM

## 2020-01-02 LAB
ALBUMIN SERPL BCP-MCNC: 3.7 G/DL (ref 3.5–5.2)
ALP SERPL-CCNC: 94 U/L (ref 55–135)
ALT SERPL W/O P-5'-P-CCNC: <5 U/L (ref 10–44)
ANION GAP SERPL CALC-SCNC: 11 MMOL/L (ref 8–16)
AST SERPL-CCNC: 12 U/L (ref 10–40)
BASOPHILS # BLD AUTO: 0.01 K/UL (ref 0–0.2)
BASOPHILS NFR BLD: 0.2 % (ref 0–1.9)
BILIRUB SERPL-MCNC: 0.6 MG/DL (ref 0.1–1)
BUN SERPL-MCNC: 13 MG/DL (ref 8–23)
CALCIUM SERPL-MCNC: 9.1 MG/DL (ref 8.7–10.5)
CHLORIDE SERPL-SCNC: 108 MMOL/L (ref 95–110)
CO2 SERPL-SCNC: 21 MMOL/L (ref 23–29)
CREAT SERPL-MCNC: 1 MG/DL (ref 0.5–1.4)
DIFFERENTIAL METHOD: ABNORMAL
EOSINOPHIL # BLD AUTO: 0.2 K/UL (ref 0–0.5)
EOSINOPHIL NFR BLD: 3.9 % (ref 0–8)
ERYTHROCYTE [DISTWIDTH] IN BLOOD BY AUTOMATED COUNT: 14.9 % (ref 11.5–14.5)
EST. GFR  (AFRICAN AMERICAN): >60 ML/MIN/1.73 M^2
EST. GFR  (NON AFRICAN AMERICAN): >60 ML/MIN/1.73 M^2
GLUCOSE SERPL-MCNC: 113 MG/DL (ref 70–110)
HCT VFR BLD AUTO: 31.4 % (ref 40–54)
HGB BLD-MCNC: 10.5 G/DL (ref 14–18)
IMM GRANULOCYTES # BLD AUTO: 0.01 K/UL (ref 0–0.04)
IMM GRANULOCYTES NFR BLD AUTO: 0.2 % (ref 0–0.5)
LIPASE SERPL-CCNC: 27 U/L (ref 4–60)
LYMPHOCYTES # BLD AUTO: 1.7 K/UL (ref 1–4.8)
LYMPHOCYTES NFR BLD: 37.2 % (ref 18–48)
MCH RBC QN AUTO: 33.2 PG (ref 27–31)
MCHC RBC AUTO-ENTMCNC: 33.4 G/DL (ref 32–36)
MCV RBC AUTO: 99 FL (ref 82–98)
MONOCYTES # BLD AUTO: 0.5 K/UL (ref 0.3–1)
MONOCYTES NFR BLD: 9.8 % (ref 4–15)
NEUTROPHILS # BLD AUTO: 2.2 K/UL (ref 1.8–7.7)
NEUTROPHILS NFR BLD: 48.7 % (ref 38–73)
NRBC BLD-RTO: 0 /100 WBC
PLATELET # BLD AUTO: 218 K/UL (ref 150–350)
PMV BLD AUTO: 9 FL (ref 9.2–12.9)
POTASSIUM SERPL-SCNC: 3.5 MMOL/L (ref 3.5–5.1)
PROT SERPL-MCNC: 7 G/DL (ref 6–8.4)
RBC # BLD AUTO: 3.16 M/UL (ref 4.6–6.2)
SODIUM SERPL-SCNC: 140 MMOL/L (ref 136–145)
TROPONIN I SERPL DL<=0.01 NG/ML-MCNC: 0.01 NG/ML (ref 0–0.03)
WBC # BLD AUTO: 4.57 K/UL (ref 3.9–12.7)

## 2020-01-02 PROCEDURE — 93010 ELECTROCARDIOGRAM REPORT: CPT | Mod: ,,, | Performed by: INTERNAL MEDICINE

## 2020-01-02 PROCEDURE — 93010 EKG 12-LEAD: ICD-10-PCS | Mod: ,,, | Performed by: INTERNAL MEDICINE

## 2020-01-02 PROCEDURE — 83690 ASSAY OF LIPASE: CPT

## 2020-01-02 PROCEDURE — 93005 ELECTROCARDIOGRAM TRACING: CPT

## 2020-01-02 PROCEDURE — 80053 COMPREHEN METABOLIC PANEL: CPT

## 2020-01-02 PROCEDURE — 84484 ASSAY OF TROPONIN QUANT: CPT

## 2020-01-02 PROCEDURE — 36415 COLL VENOUS BLD VENIPUNCTURE: CPT

## 2020-01-02 PROCEDURE — 85025 COMPLETE CBC W/AUTO DIFF WBC: CPT

## 2020-01-02 PROCEDURE — 99285 EMERGENCY DEPT VISIT HI MDM: CPT | Mod: 25

## 2020-01-02 NOTE — TELEPHONE ENCOUNTER
Spoke with pt and verbalized that he can come in on Monday the 6th and be seen by Dr. Min at 8:15. Pt understood. Because  Dr. VICTORIA  Is going to be out.

## 2020-01-02 NOTE — TELEPHONE ENCOUNTER
----- Message from Efe Gamboa sent at 1/2/2020  2:48 PM CST -----  Contact: Self- [phone]  .Type:  Sooner Apoointment Request    Caller is requesting a sooner appointment.  Caller declined first available appointment listed below.  Caller will not accept being placed on the waitlist and is requesting a message be sent to doctor.  Name of Caller:Cooper Pope Jr.  When is the first available appointment?01/17/20  Symptoms:Pt has lost a lot of weight (30-40 pounds) / stomach pain   Would the patient rather a call back or a response via [facility]? Call back   Best Call Back Number:.[phone] (home)   Additional Information:       Thank You,   Efe Gamboa

## 2020-01-03 ENCOUNTER — PES CALL (OUTPATIENT)
Dept: ADMINISTRATIVE | Facility: CLINIC | Age: 65
End: 2020-01-03
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
## 2020-01-06 ENCOUNTER — OFFICE VISIT (OUTPATIENT)
Dept: FAMILY MEDICINE | Facility: CLINIC | Age: 65
End: 2020-01-06
Payer: MEDICARE

## 2020-01-06 VITALS
BODY MASS INDEX: 18.54 KG/M2 | OXYGEN SATURATION: 99 % | HEART RATE: 98 BPM | HEIGHT: 72 IN | WEIGHT: 136.88 LBS | RESPIRATION RATE: 18 BRPM | TEMPERATURE: 98 F

## 2020-01-06 DIAGNOSIS — R10.9 RIGHT SIDED ABDOMINAL PAIN: Primary | ICD-10-CM

## 2020-01-06 DIAGNOSIS — R63.4 WEIGHT LOSS, ABNORMAL: ICD-10-CM

## 2020-01-06 DIAGNOSIS — I10 ESSENTIAL HYPERTENSION: ICD-10-CM

## 2020-01-06 DIAGNOSIS — F01.518 VASCULAR DEMENTIA WITH BEHAVIOR DISTURBANCE: ICD-10-CM

## 2020-01-06 DIAGNOSIS — I16.0 HYPERTENSIVE URGENCY: ICD-10-CM

## 2020-01-06 PROBLEM — Z12.11 SPECIAL SCREENING FOR MALIGNANT NEOPLASMS, COLON: Status: RESOLVED | Noted: 2019-08-20 | Resolved: 2020-01-06

## 2020-01-06 PROBLEM — K57.30 DIVERTICULOSIS OF COLON: Status: RESOLVED | Noted: 2019-08-20 | Resolved: 2020-01-06

## 2020-01-06 PROBLEM — K63.5 COLON POLYPS: Status: RESOLVED | Noted: 2019-08-20 | Resolved: 2020-01-06

## 2020-01-06 PROBLEM — Z12.11 COLON CANCER SCREENING: Status: RESOLVED | Noted: 2019-08-20 | Resolved: 2020-01-06

## 2020-01-06 PROCEDURE — 99213 OFFICE O/P EST LOW 20 MIN: CPT | Mod: PBBFAC,PO | Performed by: FAMILY MEDICINE

## 2020-01-06 PROCEDURE — 1159F PR MEDICATION LIST DOCUMENTED IN MEDICAL RECORD: ICD-10-PCS | Mod: ,,, | Performed by: FAMILY MEDICINE

## 2020-01-06 PROCEDURE — 99214 PR OFFICE/OUTPT VISIT, EST, LEVL IV, 30-39 MIN: ICD-10-PCS | Mod: S$PBB,,, | Performed by: FAMILY MEDICINE

## 2020-01-06 PROCEDURE — 99999 PR PBB SHADOW E&M-EST. PATIENT-LVL III: ICD-10-PCS | Mod: PBBFAC,,, | Performed by: FAMILY MEDICINE

## 2020-01-06 PROCEDURE — 1125F AMNT PAIN NOTED PAIN PRSNT: CPT | Mod: ,,, | Performed by: FAMILY MEDICINE

## 2020-01-06 PROCEDURE — 1159F MED LIST DOCD IN RCRD: CPT | Mod: ,,, | Performed by: FAMILY MEDICINE

## 2020-01-06 PROCEDURE — 1125F PR PAIN SEVERITY QUANTIFIED, PAIN PRESENT: ICD-10-PCS | Mod: ,,, | Performed by: FAMILY MEDICINE

## 2020-01-06 PROCEDURE — 99999 PR PBB SHADOW E&M-EST. PATIENT-LVL III: CPT | Mod: PBBFAC,,, | Performed by: FAMILY MEDICINE

## 2020-01-06 PROCEDURE — 99214 OFFICE O/P EST MOD 30 MIN: CPT | Mod: S$PBB,,, | Performed by: FAMILY MEDICINE

## 2020-01-06 RX ORDER — CLONIDINE HYDROCHLORIDE 0.3 MG/1
0.3 TABLET ORAL 3 TIMES DAILY
Qty: 90 TABLET | Refills: 2 | Status: SHIPPED | OUTPATIENT
Start: 2020-01-06 | End: 2020-07-10

## 2020-01-06 NOTE — PROGRESS NOTES
CHIEF COMPLAINT:  This is a 64-year-old male complaining of abdominal pain and weight loss.    SUBJECTIVE:  The patient is a difficult historian.  He is accompanied today by his wife who provides some of the history.  The patient apparently has a history of dementia.  He complains of right-sided abdominal pain on and off for several months.  Sometimes he cannot lie on his right side.  He is quite concerned about weight loss.  He has lost approximately 24 lb in the last 6 months.  He was recently seen in the ED with complaints of chest pain, abdominal pain, nausea and weight loss.  He was noted to have right inguinal adenopathy.  EKG, chest x-ray and lab including cardiac enzymes were unrevealing. The patient currently denies nausea, vomiting, diarrhea, or bright red blood per rectum.  Patient reports bowel movements every 3-4 days which are usually hard and difficult to pass.  He complains of 1 black colored stool.  Patient had colonoscopy in August 2019 which was unremarkable except for 1 polyp.  He denies anorexia, fatigue or weakness.  He admits to occasional night sweats.    Patient's blood pressure is mildly elevated at  154/98.  He takes amlodipine 10 mg daily, benazepril 40 mg daily, clonidine 0.3 mg 3 times daily, hydrochlorothiazide 12.5 mg daily and metoprolol XL 50 mg daily for hypertension.  Patient takes Namenda 10 mg twice daily for dementia.  Patient is status post renal cancer and treated hepatitis-C.  He has chronic anemia.    ROS:  GENERAL: Patient denies fever or chills.  Patient denies weight gain.  Patient denies anorexia, fatigue, weakness or swollen glands.  Positive for weight loss and night sweats.  SKIN: Patient denies rash.  HEENT: Patient denies sore throat, ear pain, hearing loss, nasal congestion, or runny nose. Patient denies visual disturbance, eye irritation or discharge.  LUNGS: Patient denies cough, wheeze or hemoptysis.  CARDIOVASCULAR: Patient denies chest pain, shortness of  breath, palpitations, syncope or lower extremity edema.  GI: Patient denies vomiting, diarrhea, blood in stool or melena.  Positive for abdominal pain, nausea and constipation.    GENITOURINARY: Patient denies dysuria, frequency, hematuria, nocturia, urgency or incontinence.  MUSCULOSKELETAL: Patient denies joint pain, swelling, redness or warmth.  NEUROLOGIC: Patient denies headache, vertigo, paresthesias, weakness in limb, dysarthria, dysphagia or abnormality of gait.  PSYCHIATRIC: Patient denies depression or anxiety.  Positive for memory loss.    OBJECTIVE:   GENERAL: Well-developed well-nourished black male alert and oriented x3 in no acute distress.  Mild-to-moderate cognitive impairment.  No hallucinations or delusions.  Positive flight of ideas.  Difficult conversationalist.  SKIN: Clear without rash.  Normal color and tone.  HEENT: Eyes: Clear conjunctivae.  No scleral icterus.  Ears: Clear canals.  Clear TMs.  Nose: Without congestion.  Pharynx: Without injection or exudates.  NECK: Supple, normal range of motion.  No lymphadenopathy.  No masses or enlarged thyroid.  No JVD.  Carotids 2+ and equal.  No bruits.  LUNGS: Clear to auscultation.  Normal respiratory effort.  CARDIOVASCULAR: Regular rhythm, normal S1, S2 without murmur, gallop or rub.  BACK: No CVA or spinal tenderness.  ABDOMEN: Normal appearance.  Active bowel sounds.  Soft, nontender without mass or organomegaly.  No rebound or guarding.  EXTREMITIES: Without cyanosis, clubbing or edema.  Distal pulses 2+ and equal.  Normal range of motion in all extremities.  No joint effusion, erythema or warmth.  Shotty inguinal adenopathy bilaterally.  NEUROLOGIC:   Motor strength equal bilaterally.  Sensation normal to touch.  Deep tendon reflexes 2+ and equal.  Gait without abnormality.  No tremor.  Negative cerebellar signs.  JEFF:  Moderately enlarged prostate, smooth, nontender.  Heme-negative stool x2.      ASSESSMENT:  1. Right sided abdominal pain     2. Weight loss, abnormal    3. Essential hypertension    4. Vascular dementia with behavior disturbance      PLAN:   1.  CT scan of the abdomen and pelvis.  2.  Follow healthy diet and eat regularly.  3.  Seek medical attention for worsening condition.  4.  Follow-up after results reviewed.    This note is generated with speech recognition software and is subject to transcription error and sound alike phrases that may be missed by proofreading.

## 2020-01-07 ENCOUNTER — TELEPHONE (OUTPATIENT)
Dept: RADIOLOGY | Facility: HOSPITAL | Age: 65
End: 2020-01-07

## 2020-01-08 ENCOUNTER — HOSPITAL ENCOUNTER (OUTPATIENT)
Dept: RADIOLOGY | Facility: HOSPITAL | Age: 65
Discharge: HOME | End: 2020-01-08
Attending: FAMILY MEDICINE
Payer: MEDICARE

## 2020-01-08 DIAGNOSIS — R63.4 WEIGHT LOSS, ABNORMAL: ICD-10-CM

## 2020-01-08 DIAGNOSIS — R10.9 RIGHT SIDED ABDOMINAL PAIN: ICD-10-CM

## 2020-01-08 PROCEDURE — 74178 CT ABDOMEN PELVIS W WO CONTRAST: ICD-10-PCS | Mod: 26,,, | Performed by: RADIOLOGY

## 2020-01-08 PROCEDURE — 74178 CT ABD&PLV WO CNTR FLWD CNTR: CPT | Mod: TC

## 2020-01-08 PROCEDURE — 25500020 PHARM REV CODE 255: Performed by: FAMILY MEDICINE

## 2020-01-08 PROCEDURE — 74178 CT ABD&PLV WO CNTR FLWD CNTR: CPT | Mod: 26,,, | Performed by: RADIOLOGY

## 2020-01-08 RX ADMIN — IOHEXOL 75 ML: 350 INJECTION, SOLUTION INTRAVENOUS at 01:01

## 2020-01-08 RX ADMIN — IOHEXOL 30 ML: 350 INJECTION, SOLUTION INTRAVENOUS at 12:01

## 2020-01-09 ENCOUNTER — TELEPHONE (OUTPATIENT)
Dept: FAMILY MEDICINE | Facility: CLINIC | Age: 65
End: 2020-01-09

## 2020-01-09 DIAGNOSIS — N28.89 LEFT KIDNEY MASS: Primary | ICD-10-CM

## 2020-01-09 NOTE — TELEPHONE ENCOUNTER
No answer. [facility] asking patient to give us a call back.     ----- Message from Annamarie Luis MD sent at 1/9/2020  9:32 AM CST -----  CONTACT PATIENT.  CT scan showed abnormal lesion of left kidney and he needs to see urologist regarding further evaluation.  The rest of the CT scan showed no other abnormalities except for kidney cysts and constipation.  Make sure that he is eating fiber taking a fiber product daily.  He also may want to take a laxative such as MiraLax to clean out his colon.

## 2020-01-10 NOTE — TELEPHONE ENCOUNTER
Let me know how soon he is going to be able to get into Urology.  You might also try urology-oncology at the [facility].

## 2020-01-10 NOTE — TELEPHONE ENCOUNTER
Patient advised. He verbally verified understanding, and asked to have results visible on myChart. I was unable to, but I did print and mail out a letter  Please also enter urology order/referral.    ----- Message from Annamarie Luis MD sent at 1/9/2020  9:32 AM CST -----  CONTACT PATIENT.  CT scan showed abnormal lesion of left kidney and he needs to see urologist regarding further evaluation.  The rest of the CT scan showed no other abnormalities except for kidney cysts and constipation.  Make sure that he is eating fiber taking a fiber product daily.  He also may want to take a laxative such as MiraLax to clean out his colon.

## 2020-01-10 NOTE — TELEPHONE ENCOUNTER
Appt scheduled with patient on 1/17/20 with Dr. Russell at the [facility]

## 2020-01-13 ENCOUNTER — TELEPHONE (OUTPATIENT)
Dept: FAMILY MEDICINE | Facility: CLINIC | Age: 65
End: 2020-01-13

## 2020-01-13 ENCOUNTER — OFFICE VISIT (OUTPATIENT)
Dept: FAMILY MEDICINE | Facility: CLINIC | Age: 65
End: 2020-01-13
Payer: MEDICARE

## 2020-01-13 ENCOUNTER — LAB VISIT (OUTPATIENT)
Dept: LAB | Facility: HOSPITAL | Age: 65
End: 2020-01-13
Attending: FAMILY MEDICINE
Payer: MEDICARE

## 2020-01-13 VITALS
WEIGHT: 142 LBS | HEIGHT: 72 IN | SYSTOLIC BLOOD PRESSURE: 164 MMHG | HEART RATE: 70 BPM | TEMPERATURE: 98 F | BODY MASS INDEX: 19.23 KG/M2 | DIASTOLIC BLOOD PRESSURE: 84 MMHG | OXYGEN SATURATION: 100 %

## 2020-01-13 DIAGNOSIS — G89.4 CHRONIC PAIN SYNDROME: ICD-10-CM

## 2020-01-13 DIAGNOSIS — F01.518 VASCULAR DEMENTIA WITH BEHAVIOR DISTURBANCE: ICD-10-CM

## 2020-01-13 DIAGNOSIS — F41.9 ANXIETY: ICD-10-CM

## 2020-01-13 DIAGNOSIS — Z12.5 SCREENING FOR PROSTATE CANCER: ICD-10-CM

## 2020-01-13 DIAGNOSIS — R10.9 LEFT SIDED ABDOMINAL PAIN: ICD-10-CM

## 2020-01-13 DIAGNOSIS — R63.4 WEIGHT LOSS, ABNORMAL: ICD-10-CM

## 2020-01-13 DIAGNOSIS — Z13.220 SCREENING FOR HYPERLIPIDEMIA: ICD-10-CM

## 2020-01-13 DIAGNOSIS — N28.89 LEFT KIDNEY MASS: Primary | ICD-10-CM

## 2020-01-13 DIAGNOSIS — I10 ESSENTIAL HYPERTENSION: ICD-10-CM

## 2020-01-13 DIAGNOSIS — M54.6 LEFT-SIDED THORACIC BACK PAIN, UNSPECIFIED CHRONICITY: ICD-10-CM

## 2020-01-13 LAB
CHOLEST SERPL-MCNC: 166 MG/DL (ref 120–199)
CHOLEST/HDLC SERPL: 2.7 {RATIO} (ref 2–5)
HDLC SERPL-MCNC: 62 MG/DL (ref 40–75)
HDLC SERPL: 37.3 % (ref 20–50)
LDLC SERPL CALC-MCNC: 90.6 MG/DL (ref 63–159)
NONHDLC SERPL-MCNC: 104 MG/DL
TRIGL SERPL-MCNC: 67 MG/DL (ref 30–150)

## 2020-01-13 PROCEDURE — 99999 PR PBB SHADOW E&M-EST. PATIENT-LVL III: CPT | Mod: PBBFAC,,, | Performed by: FAMILY MEDICINE

## 2020-01-13 PROCEDURE — 84153 ASSAY OF PSA TOTAL: CPT

## 2020-01-13 PROCEDURE — 36415 COLL VENOUS BLD VENIPUNCTURE: CPT | Mod: PO

## 2020-01-13 PROCEDURE — 99214 OFFICE O/P EST MOD 30 MIN: CPT | Mod: S$PBB,,, | Performed by: FAMILY MEDICINE

## 2020-01-13 PROCEDURE — 1125F PR PAIN SEVERITY QUANTIFIED, PAIN PRESENT: ICD-10-PCS | Mod: ,,, | Performed by: FAMILY MEDICINE

## 2020-01-13 PROCEDURE — 99213 OFFICE O/P EST LOW 20 MIN: CPT | Mod: PBBFAC,PO | Performed by: FAMILY MEDICINE

## 2020-01-13 PROCEDURE — 1159F MED LIST DOCD IN RCRD: CPT | Mod: ,,, | Performed by: FAMILY MEDICINE

## 2020-01-13 PROCEDURE — 1125F AMNT PAIN NOTED PAIN PRSNT: CPT | Mod: ,,, | Performed by: FAMILY MEDICINE

## 2020-01-13 PROCEDURE — 99214 PR OFFICE/OUTPT VISIT, EST, LEVL IV, 30-39 MIN: ICD-10-PCS | Mod: S$PBB,,, | Performed by: FAMILY MEDICINE

## 2020-01-13 PROCEDURE — 80061 LIPID PANEL: CPT

## 2020-01-13 PROCEDURE — 99999 PR PBB SHADOW E&M-EST. PATIENT-LVL III: ICD-10-PCS | Mod: PBBFAC,,, | Performed by: FAMILY MEDICINE

## 2020-01-13 PROCEDURE — 1159F PR MEDICATION LIST DOCUMENTED IN MEDICAL RECORD: ICD-10-PCS | Mod: ,,, | Performed by: FAMILY MEDICINE

## 2020-01-13 RX ORDER — ESCITALOPRAM OXALATE 10 MG/1
10 TABLET ORAL NIGHTLY
Qty: 30 TABLET | Refills: 5 | Status: SHIPPED | OUTPATIENT
Start: 2020-01-13 | End: 2021-01-12

## 2020-01-13 RX ORDER — POLYETHYLENE GLYCOL 3350 17 G/17G
17 POWDER, FOR SOLUTION ORAL DAILY
Qty: 10 PACKET | Refills: 5 | Status: SHIPPED | OUTPATIENT
Start: 2020-01-13 | End: 2020-01-23

## 2020-01-13 RX ORDER — OXYCODONE HYDROCHLORIDE 30 MG/1
30 TABLET ORAL EVERY 6 HOURS PRN
Qty: 30 TABLET | Refills: 0 | Status: SHIPPED | OUTPATIENT
Start: 2020-01-13 | End: 2020-07-20

## 2020-01-13 NOTE — TELEPHONE ENCOUNTER
----- Message from Ghazal Paz MD sent at 1/13/2020 12:58 PM CST -----  Please call patient and advise that the pharmacy called me about his pain medication prescription. He filled a prescription on 12/18/19 and should still have some pills left from that prescription. I wouldn't have prescribed him that medication if I knew the doctor from [place] was still prescribing him pain medication. He will need to follow up with pain management to get further pain medication.

## 2020-01-13 NOTE — TELEPHONE ENCOUNTER
Dr. Bethany Valadez  Internal medicine  [address], [place] [address] [phone]    Call this doctor's office and see if they are still prescribing pain medication to patient and if their clinic is still open?

## 2020-01-13 NOTE — TELEPHONE ENCOUNTER
Pharmacy called in and was wondering about pt oxycodone.  They were asking me all kinds of questions.   I went to talk to Dr. Paz and she said tell them to lencho and she will call the pt.

## 2020-01-13 NOTE — TELEPHONE ENCOUNTER
Spoke with pt and I stated that the pharmacy called me about his pain medication prescription. He filled a prescription on 12/18/19 and should still have some pills left from that prescription. I wouldn't have prescribed him that medication if I knew the doctor from [place] was still prescribing him pain medication. He will need to follow up with pain management to get further pain medication.      Pt said the old place that he went isn't open anymore.   So can you refill it? I told pt that im not sure if shes going to be able to do that.

## 2020-01-13 NOTE — TELEPHONE ENCOUNTER
Told pt that Dr. chávez  will be calling you tonight in regards to your medication.       Please call patient and advise that the pharmacy called me about his pain medication prescription. He filled a prescription on 12/18/19 and should still have some pills left from that prescription. I wouldn't have prescribed him that medication if I knew the doctor from [place] was still prescribing him pain medication. He will need to follow up with pain management to get further pain medication

## 2020-01-13 NOTE — TELEPHONE ENCOUNTER
Hey sorry im calling pt back and im going to tell him what you wrote up in the message. Your not calling him back.

## 2020-01-13 NOTE — TELEPHONE ENCOUNTER
I called Dr. Bethany Valadez   Internal medicine  [phone]   Dr. Valadez is not doing pain management anymore she is just internal medicine. But yes she stills there and still works there she just switch departments.
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
## 2020-01-14 LAB — COMPLEXED PSA SERPL-MCNC: 1.5 NG/ML (ref 0–4)

## 2020-01-17 ENCOUNTER — OFFICE VISIT (OUTPATIENT)
Dept: UROLOGY | Facility: CLINIC | Age: 65
End: 2020-01-17
Payer: MEDICARE

## 2020-01-17 ENCOUNTER — TELEPHONE (OUTPATIENT)
Dept: RADIOLOGY | Facility: HOSPITAL | Age: 65
End: 2020-01-17

## 2020-01-17 VITALS
HEIGHT: 72 IN | BODY MASS INDEX: 19.02 KG/M2 | DIASTOLIC BLOOD PRESSURE: 121 MMHG | SYSTOLIC BLOOD PRESSURE: 169 MMHG | WEIGHT: 140.44 LBS | HEART RATE: 77 BPM

## 2020-01-17 DIAGNOSIS — G21.4 VASCULAR PARKINSONISM: ICD-10-CM

## 2020-01-17 DIAGNOSIS — N28.89 LEFT RENAL MASS: Primary | ICD-10-CM

## 2020-01-17 DIAGNOSIS — F01.518 VASCULAR DEMENTIA WITH BEHAVIOR DISTURBANCE: ICD-10-CM

## 2020-01-17 PROCEDURE — 99999 PR PBB SHADOW E&M-EST. PATIENT-LVL III: CPT | Mod: PBBFAC,,, | Performed by: UROLOGY

## 2020-01-17 PROCEDURE — 1159F MED LIST DOCD IN RCRD: CPT | Mod: ,,, | Performed by: UROLOGY

## 2020-01-17 PROCEDURE — 1159F PR MEDICATION LIST DOCUMENTED IN MEDICAL RECORD: ICD-10-PCS | Mod: ,,, | Performed by: UROLOGY

## 2020-01-17 PROCEDURE — 1125F PR PAIN SEVERITY QUANTIFIED, PAIN PRESENT: ICD-10-PCS | Mod: ,,, | Performed by: UROLOGY

## 2020-01-17 PROCEDURE — 99213 OFFICE O/P EST LOW 20 MIN: CPT | Mod: PBBFAC | Performed by: UROLOGY

## 2020-01-17 PROCEDURE — 1125F AMNT PAIN NOTED PAIN PRSNT: CPT | Mod: ,,, | Performed by: UROLOGY

## 2020-01-17 PROCEDURE — 99214 PR OFFICE/OUTPT VISIT, EST, LEVL IV, 30-39 MIN: ICD-10-PCS | Mod: S$PBB,,, | Performed by: UROLOGY

## 2020-01-17 PROCEDURE — 99214 OFFICE O/P EST MOD 30 MIN: CPT | Mod: S$PBB,,, | Performed by: UROLOGY

## 2020-01-17 PROCEDURE — 99999 PR PBB SHADOW E&M-EST. PATIENT-LVL III: ICD-10-PCS | Mod: PBBFAC,,, | Performed by: UROLOGY

## 2020-01-17 NOTE — LETTER
January 17, 2020      Annamarie Luis MD  [address] [place] [address]           [facility] Urology Oncology  [address] [place] [address]  Phone: [phone]  Fax: [phone]          Patient: Cooper Pope Jr.   MR Number: [id number]   YOB: 1955   Date of Visit: 1/17/2020       Dear Dr. Ananmarie Luis:    Thank you for referring Cooper Pope to me for evaluation. Attached you will find relevant portions of my assessment and plan of care.    If you have questions, please do not hesitate to call me. I look forward to following Cooper Pope along with you.    Sincerely,    Jensen Russell MD    Enclosure  CC:  No Recipients    If you would like to receive this communication electronically, please contact [email or web address] or [phone] to request more information on [employer] access.    For providers and/or their staff who would like to refer a patient to [facility], please contact us through our one-stop-shop provider referral line, [facility], at [phone].    If you feel you have received this communication in error or would no longer like to receive these types of communications, please e-mail [email or web address]
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
## 2020-01-20 ENCOUNTER — OFFICE VISIT (OUTPATIENT)
Dept: NEUROLOGY | Facility: CLINIC | Age: 65
End: 2020-01-20
Payer: MEDICARE

## 2020-01-20 VITALS
HEIGHT: 72 IN | WEIGHT: 143.5 LBS | BODY MASS INDEX: 19.44 KG/M2 | DIASTOLIC BLOOD PRESSURE: 98 MMHG | HEART RATE: 80 BPM | SYSTOLIC BLOOD PRESSURE: 140 MMHG | RESPIRATION RATE: 16 BRPM

## 2020-01-20 DIAGNOSIS — Z87.828 H/O PENETRATING ABDOMINAL TRAUMA: ICD-10-CM

## 2020-01-20 DIAGNOSIS — I27.20 PULMONARY HYPERTENSION: ICD-10-CM

## 2020-01-20 DIAGNOSIS — G44.209 TENSION-TYPE HEADACHE, NOT INTRACTABLE, UNSPECIFIED CHRONICITY PATTERN: ICD-10-CM

## 2020-01-20 DIAGNOSIS — M54.50 CHRONIC LOW BACK PAIN, UNSPECIFIED BACK PAIN LATERALITY, UNSPECIFIED WHETHER SCIATICA PRESENT: ICD-10-CM

## 2020-01-20 DIAGNOSIS — N40.0 BENIGN PROSTATIC HYPERPLASIA, UNSPECIFIED WHETHER LOWER URINARY TRACT SYMPTOMS PRESENT: ICD-10-CM

## 2020-01-20 DIAGNOSIS — I10 ESSENTIAL HYPERTENSION: ICD-10-CM

## 2020-01-20 DIAGNOSIS — D63.8 ANEMIA OF CHRONIC DISEASE: ICD-10-CM

## 2020-01-20 DIAGNOSIS — M48.061 SPINAL STENOSIS OF LUMBAR REGION WITH RADICULOPATHY: ICD-10-CM

## 2020-01-20 DIAGNOSIS — K58.9 IRRITABLE BOWEL SYNDROME WITHOUT DIARRHEA: ICD-10-CM

## 2020-01-20 DIAGNOSIS — G31.84 MILD NEUROCOGNITIVE DISORDER: ICD-10-CM

## 2020-01-20 DIAGNOSIS — R27.0 ATAXIA: ICD-10-CM

## 2020-01-20 DIAGNOSIS — G89.4 CHRONIC PAIN SYNDROME: ICD-10-CM

## 2020-01-20 DIAGNOSIS — M48.061 SPINAL STENOSIS OF LUMBAR REGION, UNSPECIFIED WHETHER NEUROGENIC CLAUDICATION PRESENT: ICD-10-CM

## 2020-01-20 DIAGNOSIS — E78.00 PURE HYPERCHOLESTEROLEMIA: ICD-10-CM

## 2020-01-20 DIAGNOSIS — C64.1 MALIGNANT NEOPLASM OF RIGHT KIDNEY: ICD-10-CM

## 2020-01-20 DIAGNOSIS — E53.8 VITAMIN B12 DEFICIENCY: ICD-10-CM

## 2020-01-20 DIAGNOSIS — G89.29 CHRONIC LOW BACK PAIN, UNSPECIFIED BACK PAIN LATERALITY, UNSPECIFIED WHETHER SCIATICA PRESENT: ICD-10-CM

## 2020-01-20 DIAGNOSIS — R00.2 PALPITATIONS: ICD-10-CM

## 2020-01-20 DIAGNOSIS — M46.46 DISCITIS OF LUMBAR REGION: ICD-10-CM

## 2020-01-20 DIAGNOSIS — B18.2 HEP C W/O COMA, CHRONIC: ICD-10-CM

## 2020-01-20 DIAGNOSIS — R94.31 ABNORMAL ECG: ICD-10-CM

## 2020-01-20 DIAGNOSIS — N28.89 LEFT RENAL MASS: ICD-10-CM

## 2020-01-20 DIAGNOSIS — M54.16 SPINAL STENOSIS OF LUMBAR REGION WITH RADICULOPATHY: ICD-10-CM

## 2020-01-20 DIAGNOSIS — I63.239 CEREBRAL INFARCTION DUE TO OCCLUSION OF CAROTID ARTERY, UNSPECIFIED BLOOD VESSEL LATERALITY: ICD-10-CM

## 2020-01-20 DIAGNOSIS — G21.4 VASCULAR PARKINSONISM: ICD-10-CM

## 2020-01-20 DIAGNOSIS — D63.8 ANEMIA IN CHRONIC ILLNESS: ICD-10-CM

## 2020-01-20 DIAGNOSIS — F01.518 VASCULAR DEMENTIA WITH BEHAVIOR DISTURBANCE: Primary | ICD-10-CM

## 2020-01-20 DIAGNOSIS — I51.7 LVH (LEFT VENTRICULAR HYPERTROPHY): ICD-10-CM

## 2020-01-20 DIAGNOSIS — F41.9 ANXIETY: ICD-10-CM

## 2020-01-20 PROCEDURE — 99999 PR PBB SHADOW E&M-EST. PATIENT-LVL IV: CPT | Mod: PBBFAC,,, | Performed by: PSYCHIATRY & NEUROLOGY

## 2020-01-20 PROCEDURE — 99214 OFFICE O/P EST MOD 30 MIN: CPT | Mod: S$PBB,,, | Performed by: PSYCHIATRY & NEUROLOGY

## 2020-01-20 PROCEDURE — 99214 PR OFFICE/OUTPT VISIT, EST, LEVL IV, 30-39 MIN: ICD-10-PCS | Mod: S$PBB,,, | Performed by: PSYCHIATRY & NEUROLOGY

## 2020-01-20 PROCEDURE — 1125F PR PAIN SEVERITY QUANTIFIED, PAIN PRESENT: ICD-10-PCS | Mod: ,,, | Performed by: PSYCHIATRY & NEUROLOGY

## 2020-01-20 PROCEDURE — 1125F AMNT PAIN NOTED PAIN PRSNT: CPT | Mod: ,,, | Performed by: PSYCHIATRY & NEUROLOGY

## 2020-01-20 PROCEDURE — 99214 OFFICE O/P EST MOD 30 MIN: CPT | Mod: PBBFAC | Performed by: PSYCHIATRY & NEUROLOGY

## 2020-01-20 PROCEDURE — 99999 PR PBB SHADOW E&M-EST. PATIENT-LVL IV: ICD-10-PCS | Mod: PBBFAC,,, | Performed by: PSYCHIATRY & NEUROLOGY

## 2020-01-20 PROCEDURE — 1159F MED LIST DOCD IN RCRD: CPT | Mod: ,,, | Performed by: PSYCHIATRY & NEUROLOGY

## 2020-01-20 PROCEDURE — 1159F PR MEDICATION LIST DOCUMENTED IN MEDICAL RECORD: ICD-10-PCS | Mod: ,,, | Performed by: PSYCHIATRY & NEUROLOGY

## 2020-01-20 RX ORDER — LAMOTRIGINE 25 MG/1
25 TABLET ORAL 2 TIMES DAILY
Qty: 60 TABLET | Refills: 11 | Status: SHIPPED | OUTPATIENT
Start: 2020-01-20 | End: 2020-07-20 | Stop reason: SDUPTHER
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
## 2020-01-20 NOTE — PATIENT INSTRUCTIONS
Lamotrigine tablets  What is this medicine?  LAMOTRIGINE (la TOMMY tri jeen) is used to control seizures in adults and children with epilepsy and Lennox-Gastaut syndrome. It is also used in adults to treat bipolar disorder.  How should I use this medicine?  Take this medicine by mouth with a glass of water. Follow the directions on the prescription label. Do not chew these tablets. If this medicine upsets your stomach, take it with food or milk. Take your doses at regular intervals. Do not take your medicine more often than directed.  A special MedGuide will be given to you by the pharmacist with each new prescription and refill. Be sure to read this information carefully each time.  Talk to your pediatrician regarding the use of this medicine in children. While this drug may be prescribed for children as young as 2 years for selected conditions, precautions do apply.  What side effects may I notice from receiving this medicine?  Side effects you should report to your doctor or health care professional as soon as possible:  · allergic reactions like skin rash, itching or hives, swelling of the face, lips, or tongue  · blurred or double vision  · difficulty walking or controlling muscle movements  · fever  · headache, stiff neck, and sensitivity to light  · painful sores in the mouth, eyes, or nose  · redness, blistering, peeling or loosening of the skin, including inside the mouth  · severe muscle pain  · swollen lymph glands  · uncontrollable eye movements  · unusual bruising or bleeding  · unusually weak or tired  · vomiting  · worsening of mood, thoughts or actions of suicide or dying  · yellowing of the eyes or skin  Side effects that usually do not require medical attention (report to your doctor or health care professional if they continue or are bothersome):  · diarrhea or constipation  · difficulty sleeping  · nausea  · tremors  What may interact with this medicine?  · carbamazepine  · female hormones,  including contraceptive or birth control pills  · methotrexate  · phenobarbital  · phenytoin  · primidone  · pyrimethamine  · rifampin  · trimethoprim  · valproic acid  What if I miss a dose?  If you miss a dose, take it as soon as you can. If it is almost time for your next dose, take only that dose. Do not take double or extra doses.  Where should I keep my medicine?  Keep out of reach of children.  Store at room temperature between 15 and 30 degrees C (59 and 86 degrees F). Throw away any unused medicine after the expiration date.  What should I tell my health care provider before I take this medicine?  They need to know if you have any of these conditions:  · a history of depression or bipolar disorder  · aseptic meningitis during prior use of lamotrigine  · folate deficiency  · kidney disease  · liver disease  · suicidal thoughts, plans, or attempt; a previous suicide attempt by you or a family member  · an unusual or allergic reaction to lamotrigine or other seizure medications, other medicines, foods, dyes, or preservatives  · pregnant or trying to get pregnant  · breast-feeding  What should I watch for while using this medicine?  Visit your doctor or health care professional for regular checks on your progress. If you take this medicine for seizures, wear a Medic Alert bracelet or necklace. Carry an identification card with information about your condition, medicines, and doctor or health care professional.  It is important to take this medicine exactly as directed. When first starting treatment, your dose will need to be adjusted slowly. It may take weeks or months before your dose is stable. You should contact your doctor or health care professional if your seizures get worse or if you have any new types of seizures. Do not stop taking this medicine unless instructed by your doctor or health care professional. Stopping your medicine suddenly can increase your seizures or their severity.  Contact your  doctor or health care professional right away if you develop a rash while taking this medicine. Rashes may be very severe and sometimes require treatment in the hospital. Deaths from rashes have occurred. Serious rashes occur more often in children than adults taking this medicine. It is more common for these serious rashes to occur during the first 2 months of treatment, but a rash can occur at any time.  You may get drowsy, dizzy, or have blurred vision. Do not drive, use machinery, or do anything that needs mental alertness until you know how this medicine affects you. To reduce dizzy or fainting spells, do not sit or stand up quickly, especially if you are an older patient. Alcohol can increase drowsiness and dizziness. Avoid alcoholic drinks.  If you are taking this medicine for bipolar disorder, it is important to report any changes in your mood to your doctor or health care professional. If your condition gets worse, you get mentally depressed, feel very hyperactive or manic, have difficulty sleeping, or have thoughts of hurting yourself or committing suicide, you need to get help from your health care professional right away. If you are a caregiver for someone taking this medicine for bipolar disorder, you should also report these behavioral changes right away. The use of this medicine may increase the chance of suicidal thoughts or actions. Pay special attention to how you are responding while on this medicine.  Your mouth may get dry. Chewing sugarless gum or sucking hard candy, and drinking plenty of water may help. Contact your doctor if the problem does not go away or is severe.  Women who become pregnant while using this medicine may enroll in the [place] Antiepileptic Drug Pregnancy Registry by calling 1-299.859.5970. This registry collects information about the safety of antiepileptic drug use during pregnancy.  NOTE:This sheet is a summary. It may not cover all possible information. If you  have questions about this medicine, talk to your doctor, pharmacist, or health care provider. Copyright© 2017 [employer]

## 2020-01-24 ENCOUNTER — TELEPHONE (OUTPATIENT)
Dept: RADIOLOGY | Facility: HOSPITAL | Age: 65
End: 2020-01-24

## 2020-01-24 DIAGNOSIS — N28.89 RENAL MASS: Primary | ICD-10-CM

## 2020-02-05 ENCOUNTER — ANESTHESIA (OUTPATIENT)
Dept: RADIOLOGY | Facility: HOSPITAL | Age: 65
End: 2020-02-05
Payer: MEDICARE

## 2020-02-05 ENCOUNTER — HOSPITAL ENCOUNTER (OUTPATIENT)
Dept: RADIOLOGY | Facility: HOSPITAL | Age: 65
Discharge: HOME | End: 2020-02-05
Attending: PHYSICIAN ASSISTANT
Payer: MEDICARE

## 2020-02-05 ENCOUNTER — ANESTHESIA EVENT (OUTPATIENT)
Dept: RADIOLOGY | Facility: HOSPITAL | Age: 65
End: 2020-02-05
Payer: MEDICARE

## 2020-02-05 VITALS
DIASTOLIC BLOOD PRESSURE: 83 MMHG | OXYGEN SATURATION: 98 % | BODY MASS INDEX: 18.15 KG/M2 | HEART RATE: 74 BPM | RESPIRATION RATE: 18 BRPM | TEMPERATURE: 98 F | WEIGHT: 134 LBS | HEIGHT: 72 IN | SYSTOLIC BLOOD PRESSURE: 128 MMHG

## 2020-02-05 DIAGNOSIS — N28.89 RENAL MASS: ICD-10-CM

## 2020-02-05 PROCEDURE — 63600175 PHARM REV CODE 636 W HCPCS: Performed by: RADIOLOGY

## 2020-02-05 PROCEDURE — 25000003 PHARM REV CODE 250: Performed by: ANESTHESIOLOGY

## 2020-02-05 PROCEDURE — 25000003 PHARM REV CODE 250: Performed by: NURSE ANESTHETIST, CERTIFIED REGISTERED

## 2020-02-05 PROCEDURE — 50593 PERC CRYO ABLATE RENAL TUM: CPT

## 2020-02-05 PROCEDURE — 63600175 PHARM REV CODE 636 W HCPCS: Performed by: NURSE ANESTHETIST, CERTIFIED REGISTERED

## 2020-02-05 PROCEDURE — 25500020 PHARM REV CODE 255: Performed by: PHYSICIAN ASSISTANT

## 2020-02-05 PROCEDURE — 63600175 PHARM REV CODE 636 W HCPCS: Performed by: ANESTHESIOLOGY

## 2020-02-05 RX ORDER — DEXAMETHASONE SODIUM PHOSPHATE 4 MG/ML
INJECTION, SOLUTION INTRA-ARTICULAR; INTRALESIONAL; INTRAMUSCULAR; INTRAVENOUS; SOFT TISSUE
Status: DISCONTINUED | OUTPATIENT
Start: 2020-02-05 | End: 2020-02-05

## 2020-02-05 RX ORDER — SODIUM CHLORIDE, SODIUM LACTATE, POTASSIUM CHLORIDE, CALCIUM CHLORIDE 600; 310; 30; 20 MG/100ML; MG/100ML; MG/100ML; MG/100ML
INJECTION, SOLUTION INTRAVENOUS CONTINUOUS PRN
Status: DISCONTINUED | OUTPATIENT
Start: 2020-02-05 | End: 2020-02-05

## 2020-02-05 RX ORDER — NEOSTIGMINE METHYLSULFATE 1 MG/ML
INJECTION, SOLUTION INTRAVENOUS
Status: DISCONTINUED | OUTPATIENT
Start: 2020-02-05 | End: 2020-02-05

## 2020-02-05 RX ORDER — HYDROMORPHONE HYDROCHLORIDE 2 MG/ML
0.5 INJECTION, SOLUTION INTRAMUSCULAR; INTRAVENOUS; SUBCUTANEOUS EVERY 10 MIN PRN
Status: DISCONTINUED | OUTPATIENT
Start: 2020-02-05 | End: 2020-02-06 | Stop reason: HOSPADM

## 2020-02-05 RX ORDER — SUCCINYLCHOLINE CHLORIDE 20 MG/ML
INJECTION INTRAMUSCULAR; INTRAVENOUS
Status: DISCONTINUED | OUTPATIENT
Start: 2020-02-05 | End: 2020-02-05

## 2020-02-05 RX ORDER — GLYCOPYRROLATE 0.2 MG/ML
INJECTION INTRAMUSCULAR; INTRAVENOUS
Status: DISCONTINUED | OUTPATIENT
Start: 2020-02-05 | End: 2020-02-05

## 2020-02-05 RX ORDER — FENTANYL CITRATE 50 UG/ML
INJECTION, SOLUTION INTRAMUSCULAR; INTRAVENOUS
Status: DISCONTINUED | OUTPATIENT
Start: 2020-02-05 | End: 2020-02-05

## 2020-02-05 RX ORDER — OXYCODONE HYDROCHLORIDE 5 MG/1
10 TABLET ORAL EVERY 6 HOURS PRN
Status: DISCONTINUED | OUTPATIENT
Start: 2020-02-05 | End: 2020-02-06 | Stop reason: HOSPADM

## 2020-02-05 RX ORDER — HYDRALAZINE HYDROCHLORIDE 20 MG/ML
10 INJECTION INTRAMUSCULAR; INTRAVENOUS EVERY 10 MIN PRN
Status: DISCONTINUED | OUTPATIENT
Start: 2020-02-05 | End: 2020-02-06 | Stop reason: HOSPADM

## 2020-02-05 RX ORDER — LIDOCAINE HYDROCHLORIDE 10 MG/ML
INJECTION, SOLUTION EPIDURAL; INFILTRATION; INTRACAUDAL; PERINEURAL
Status: DISCONTINUED | OUTPATIENT
Start: 2020-02-05 | End: 2020-02-05

## 2020-02-05 RX ORDER — CIPROFLOXACIN 2 MG/ML
400 INJECTION, SOLUTION INTRAVENOUS ONCE
Status: COMPLETED | OUTPATIENT
Start: 2020-02-05 | End: 2020-02-05

## 2020-02-05 RX ORDER — FENTANYL CITRATE 50 UG/ML
25 INJECTION, SOLUTION INTRAMUSCULAR; INTRAVENOUS EVERY 5 MIN PRN
Status: COMPLETED | OUTPATIENT
Start: 2020-02-05 | End: 2020-02-05

## 2020-02-05 RX ORDER — PROPOFOL 10 MG/ML
VIAL (ML) INTRAVENOUS
Status: DISCONTINUED | OUTPATIENT
Start: 2020-02-05 | End: 2020-02-05

## 2020-02-05 RX ORDER — ONDANSETRON 8 MG/1
8 TABLET, ORALLY DISINTEGRATING ORAL ONCE
Status: DISCONTINUED | OUTPATIENT
Start: 2020-02-05 | End: 2020-02-06 | Stop reason: HOSPADM

## 2020-02-05 RX ORDER — CIPROFLOXACIN 500 MG/1
500 TABLET ORAL 2 TIMES DAILY
Qty: 20 TABLET | Refills: 0 | Status: SHIPPED | OUTPATIENT
Start: 2020-02-05 | End: 2020-02-15

## 2020-02-05 RX ORDER — ROCURONIUM BROMIDE 10 MG/ML
INJECTION, SOLUTION INTRAVENOUS
Status: DISCONTINUED | OUTPATIENT
Start: 2020-02-05 | End: 2020-02-05

## 2020-02-05 RX ORDER — ONDANSETRON 2 MG/ML
INJECTION INTRAMUSCULAR; INTRAVENOUS
Status: DISCONTINUED | OUTPATIENT
Start: 2020-02-05 | End: 2020-02-05

## 2020-02-05 RX ADMIN — FENTANYL CITRATE 100 MCG: 50 INJECTION, SOLUTION INTRAMUSCULAR; INTRAVENOUS at 10:02

## 2020-02-05 RX ADMIN — CIPROFLOXACIN 400 MG: 2 INJECTION, SOLUTION INTRAVENOUS at 10:02

## 2020-02-05 RX ADMIN — LIDOCAINE HYDROCHLORIDE 50 MG: 10 INJECTION, SOLUTION EPIDURAL; INFILTRATION; INTRACAUDAL; PERINEURAL at 10:02

## 2020-02-05 RX ADMIN — FENTANYL CITRATE 50 MCG: 50 INJECTION, SOLUTION INTRAMUSCULAR; INTRAVENOUS at 11:02

## 2020-02-05 RX ADMIN — FENTANYL CITRATE 25 MCG: 50 INJECTION, SOLUTION INTRAMUSCULAR; INTRAVENOUS at 01:02

## 2020-02-05 RX ADMIN — HYDROMORPHONE HYDROCHLORIDE 0.5 MG: 2 INJECTION INTRAMUSCULAR; INTRAVENOUS; SUBCUTANEOUS at 02:02

## 2020-02-05 RX ADMIN — ROCURONIUM BROMIDE 5 MG: 10 INJECTION, SOLUTION INTRAVENOUS at 10:02

## 2020-02-05 RX ADMIN — ONDANSETRON 4 MG: 2 INJECTION, SOLUTION INTRAMUSCULAR; INTRAVENOUS at 10:02

## 2020-02-05 RX ADMIN — OXYCODONE HYDROCHLORIDE 10 MG: 5 TABLET ORAL at 02:02

## 2020-02-05 RX ADMIN — DEXAMETHASONE SODIUM PHOSPHATE 4 MG: 4 INJECTION, SOLUTION INTRA-ARTICULAR; INTRALESIONAL; INTRAMUSCULAR; INTRAVENOUS; SOFT TISSUE at 10:02

## 2020-02-05 RX ADMIN — SUCCINYLCHOLINE CHLORIDE 120 MG: 20 INJECTION, SOLUTION INTRAMUSCULAR; INTRAVENOUS; PARENTERAL at 10:02

## 2020-02-05 RX ADMIN — GLYCOPYRROLATE 0.6 MG: 0.2 INJECTION, SOLUTION INTRAMUSCULAR; INTRAVENOUS at 11:02

## 2020-02-05 RX ADMIN — IOHEXOL 75 ML: 350 INJECTION, SOLUTION INTRAVENOUS at 10:02

## 2020-02-05 RX ADMIN — ROCURONIUM BROMIDE 20 MG: 10 INJECTION, SOLUTION INTRAVENOUS at 11:02

## 2020-02-05 RX ADMIN — FENTANYL CITRATE 50 MCG: 50 INJECTION, SOLUTION INTRAMUSCULAR; INTRAVENOUS at 10:02

## 2020-02-05 RX ADMIN — SODIUM CHLORIDE, SODIUM LACTATE, POTASSIUM CHLORIDE, AND CALCIUM CHLORIDE: 600; 310; 30; 20 INJECTION, SOLUTION INTRAVENOUS at 10:02

## 2020-02-05 RX ADMIN — FENTANYL CITRATE 25 MCG: 50 INJECTION, SOLUTION INTRAMUSCULAR; INTRAVENOUS at 02:02

## 2020-02-05 RX ADMIN — NEOSTIGMINE METHYLSULFATE 4 MG: 1 INJECTION INTRAVENOUS at 11:02

## 2020-02-05 RX ADMIN — HYDRALAZINE HYDROCHLORIDE 10 MG: 20 INJECTION INTRAMUSCULAR; INTRAVENOUS at 12:02

## 2020-02-05 RX ADMIN — PROPOFOL 150 MG: 10 INJECTION, EMULSION INTRAVENOUS at 10:02

## 2020-02-05 RX ADMIN — ROCURONIUM BROMIDE 20 MG: 10 INJECTION, SOLUTION INTRAVENOUS at 10:02

## 2020-02-05 NOTE — DISCHARGE SUMMARY
Radiology Discharge Summary      Hospital Course: No complications    Admit Date: 2/5/2020  Discharge Date: 02/05/2020     Instructions Given to Patient: Yes  Diet: regular diet and Resume prior diet  Activity: activity as tolerated and no driving for today    Description of Condition on Discharge: Stable  Vital Signs (Most Recent): Pulse: 64 (02/05/20 0927)  Resp: 16 (02/05/20 0927)  BP: (!) 169/106 (02/05/20 0927)  SpO2: 99 % (02/05/20 0927)    Discharge Disposition: Home    Discharge Diagnosis: rcc     Follow-up: with dr. Parekh.  CT urogram in 2 months.      Andrea Morrison MD  Staff Radiologist  Department of Radiology  Pager: [phone]

## 2020-02-05 NOTE — PROCEDURES
Radiology Post-Procedure Note    Pre Op Diagnosis: rcc  Post Op Diagnosis: Same    Procedure: cryoablation    Procedure performed by: Dr Andrea Morrison    Written Informed Consent Obtained: Yes  Specimen Removed: NO  Estimated Blood Loss: Minimal    Findings:   No complications of left renal cryoablation.      Patient tolerated procedure well.    Andrea Morrison MD  Staff Radiologist  Department of Radiology  Pager: [phone]

## 2020-02-05 NOTE — ANESTHESIA PREPROCEDURE EVALUATION
02/05/2020  Cooper Pope Jr. is a 64 y.o., male.    Anesthesia Evaluation    I have reviewed the Patient Summary Reports.    I have reviewed the Nursing Notes.   I have reviewed the Medications.     Review of Systems  Anesthesia Hx:  No problems with previous Anesthesia    Hematology/Oncology:         -- Cancer in past history:   Cardiovascular:   Hypertension CHF ECG has been reviewed. CONCLUSIONS     1 - Concentric hypertrophy.     2 - No wall motion abnormalities.     3 - Low normal to mildly depressed left ventricular systolic function (EF 50-55%).     4 - Indeterminate LV diastolic function.     5 - Normal right ventricular systolic function .     6 - Pulmonary hypertension. The estimated PA systolic pressure is 47 mmHg.     7 - Trivial mitral regurgitation.     8 - Trivial to mild tricuspid regurgitation.    Renal/:   Chronic Renal Disease    Hepatic/GI:   PUD, Liver Disease, Hepatitis Hep c   Musculoskeletal:   Arthritis   Spine Disorders: (spinal stenosis)    Neurological:   TIA, CVA, residual symptoms Neuromuscular Disease, Headaches    Psych:   Psychiatric History dementia         Physical Exam  General:  Obesity    Airway/Jaw/Neck:  Airway Findings: Mouth Opening: Normal Tongue: Large  General Airway Assessment: Adult  Mallampati: II  TM Distance: Normal, at least 6 cm      Dental:  Dental Findings: Upper Dentures Poor dentition  Denies anything loose chipped or removable        Mental Status:  Mental Status Findings:  Alert and Oriented         Anesthesia Plan  Type of Anesthesia, risks & benefits discussed:  Anesthesia Type:  general  Patient's Preference:   Intra-op Monitoring Plan: standard ASA monitors  Intra-op Monitoring Plan Comments:   Post Op Pain Control Plan:   Post Op Pain Control Plan Comments:   Induction:   IV  Beta Blocker:         Informed Consent: Patient understands  risks and agrees with Anesthesia plan.  Questions answered. Anesthesia consent signed with patient.  ASA Score: 4     Day of Surgery Review of History & Physical: I have interviewed and examined the patient. I have reviewed the patient's H&P dated:  There are no significant changes.          Ready For Surgery From Anesthesia Perspective.

## 2020-02-05 NOTE — TRANSFER OF CARE
Anesthesia Transfer of Care Note    Patient: Cooper Pope Jr.    Procedure(s) Performed: * No procedures listed *    Patient location: PACU    Anesthesia Type: general    Transport from OR: Transported from OR on room air with adequate spontaneous ventilation    Post pain: adequate analgesia    Post assessment: no apparent anesthetic complications    Post vital signs: stable    Level of consciousness: awake    Complications: none    Transfer of care protocol was followed      Last vitals:   Visit Vitals  BP (!) 169/106 (BP Location: Right arm, Patient Position: Lying)   Pulse 64   Resp 16   Ht 6' (1.829 m)   Wt 60.8 kg (134 lb)   SpO2 99%   BMI 18.17 kg/m²

## 2020-02-05 NOTE — ANESTHESIA POSTPROCEDURE EVALUATION
Anesthesia Post Evaluation    Patient: Cooper Pope Jr.    Procedure(s) Performed: * No procedures listed *        Patient location during evaluation: PACU  Patient participation: Yes- Able to Participate  Level of consciousness: awake and alert  Post-procedure vital signs: reviewed and stable  Pain management: adequate  Airway patency: patent  LOIDA mitigation strategies: Preoperative initiation of continuous positive airway pressure (CPAP) or non-invasive positive pressure ventilation (NIPPV)  PONV status at discharge: No PONV  Anesthetic complications: no      Cardiovascular status: hemodynamically stable  Respiratory status: unassisted  Hydration status: euvolemic  Follow-up not needed.          Vitals Value Taken Time   /124 2/5/2020 12:22 PM   Temp 98.8 2/5/2020 12:25 PM   Pulse 51 2/5/2020 12:24 PM   Resp 11 2/5/2020 12:24 PM   SpO2 100 % 2/5/2020 12:24 PM   Vitals shown include unvalidated device data.      No case tracking events are documented in the log.      Pain/Say Score: No data recorded      
No

## 2020-02-05 NOTE — SEDATION DOCUMENTATION
Pt transferred to stretcher supine and transported to pacu.  Bedside report given to Jose C Gibson.  Transfer of care complete.

## 2020-02-07 ENCOUNTER — TELEPHONE (OUTPATIENT)
Dept: RADIOLOGY | Facility: HOSPITAL | Age: 65
End: 2020-02-07

## 2020-02-07 RX ORDER — HYDROCODONE BITARTRATE AND IBUPROFEN 7.5; 2 MG/1; MG/1
1 TABLET, FILM COATED ORAL EVERY 8 HOURS PRN
Qty: 15 TABLET | Refills: 0 | Status: SHIPPED | OUTPATIENT
Start: 2020-02-07 | End: 2020-07-20

## 2020-02-07 RX ORDER — HYDROCODONE BITARTRATE AND ACETAMINOPHEN 5; 325 MG/1; MG/1
1 TABLET ORAL EVERY 6 HOURS PRN
Qty: 15 TABLET | Refills: 0 | Status: SHIPPED | OUTPATIENT
Start: 2020-02-07 | End: 2020-07-20

## 2020-02-07 NOTE — CARE UPDATE
Patient with pain post cryoablation not responding to NSAIDS.  Called in script for hydrocodone ibuprofen.  Will continue to monitor.

## 2020-02-12 ENCOUNTER — TELEPHONE (OUTPATIENT)
Dept: RADIOLOGY | Facility: HOSPITAL | Age: 65
End: 2020-02-12

## 2020-03-03 ENCOUNTER — TELEPHONE (OUTPATIENT)
Dept: PALLIATIVE MEDICINE | Facility: CLINIC | Age: 65
End: 2020-03-03

## 2020-03-03 NOTE — TELEPHONE ENCOUNTER
"[facility] Palliative Care  Medical Specialty       Patient Name: Cooper Pope Jr.  MRN: [id number]  Primary Care Physician: Ghazal Paz MD    Received message that this patient called requesting palliative care appointment. I contacted patient to discuss, but his wife, Alba, answered. Alba stated patient was interested in an appointment with palliative care, because of his diagnoses and he "is tired". Alba asked if palliative care was hospice, and we discussed the difference between palliative care and hospice. Alba believes patient would benefit from advance care planning and any other support offered by palliative provider. She expressed gratitude for the phone call and helping her understand palliative care.   She requested next available appt for patient, and I was able to schedule him for 3/12.         Angela Interiano, MSW, [facility]  [phone]  "

## 2020-05-04 ENCOUNTER — TELEPHONE (OUTPATIENT)
Dept: RADIOLOGY | Facility: HOSPITAL | Age: 65
End: 2020-05-04

## 2020-05-06 ENCOUNTER — TELEPHONE (OUTPATIENT)
Dept: INTERNAL MEDICINE | Facility: CLINIC | Age: 65
End: 2020-05-06

## 2020-05-06 NOTE — TELEPHONE ENCOUNTER
----- Message from Valeria Zeng sent at 5/6/2020  2:31 PM CDT -----  Contact: Pt  Pt is requesting call back in regards to rescheduling missed appt          Pls call back at [phone]

## 2020-05-07 ENCOUNTER — TELEPHONE (OUTPATIENT)
Dept: PALLIATIVE MEDICINE | Facility: CLINIC | Age: 65
End: 2020-05-07

## 2020-05-07 NOTE — TELEPHONE ENCOUNTER
Called patient to re-schedule Palliative Care appointment.  No answer, left message with my direct line.    Tammy Mcqueen RN  Palliative Care  [phone]

## 2020-05-12 ENCOUNTER — TELEPHONE (OUTPATIENT)
Dept: RADIOLOGY | Facility: HOSPITAL | Age: 65
End: 2020-05-12

## 2020-05-12 NOTE — TELEPHONE ENCOUNTER
Interventional Radiology:  Called patient wife to get CT scheduled - he no showed for 4/23 CT, wife answered and said to call the  directly.  Called patient and left a voice mail.

## 2020-07-10 DIAGNOSIS — I16.0 HYPERTENSIVE URGENCY: ICD-10-CM

## 2020-07-10 RX ORDER — CLONIDINE HYDROCHLORIDE 0.3 MG/1
TABLET ORAL
Qty: 90 TABLET | Refills: 0 | Status: SHIPPED | OUTPATIENT
Start: 2020-07-10 | End: 2020-08-20

## 2020-07-10 NOTE — TELEPHONE ENCOUNTER
----- Message from Valeria Zeng sent at 7/10/2020  4:02 PM CDT -----  Regarding: Refill  Type:  RX Refill Request    Who Called: PT  Refill or New Rx:REFILL  RX Name and Strength:  cloNIDine (CATAPRES) 0.3 MG tablet  How is the patient currently taking it? (ex. 1XDay):1  Is this a 30 day or 90 day RX:30  Preferred Pharmacy with phone number:  [facility] [address] [place] [id number] [address] [address]  Phone: [phone] Fax: [phone]  Local or Mail Order:LOCAL  Ordering Provider:JOSÉ MANUEL  Would the patient rather a call back or a response via [facility]? CALL BACK  Best Call Back Number:[phone]  Additional Information:

## 2020-07-16 ENCOUNTER — PATIENT OUTREACH (OUTPATIENT)
Dept: ADMINISTRATIVE | Facility: OTHER | Age: 65
End: 2020-07-16

## 2020-07-16 NOTE — PROGRESS NOTES
Requested updates within Care Everywhere.  Patient's chart was reviewed for overdue DELMY topics.  Immunizations reconciled.

## 2020-07-20 ENCOUNTER — OFFICE VISIT (OUTPATIENT)
Dept: NEUROLOGY | Facility: CLINIC | Age: 65
End: 2020-07-20
Payer: MEDICARE

## 2020-07-20 VITALS
BODY MASS INDEX: 18.57 KG/M2 | HEIGHT: 72 IN | DIASTOLIC BLOOD PRESSURE: 82 MMHG | HEART RATE: 72 BPM | WEIGHT: 137.13 LBS | SYSTOLIC BLOOD PRESSURE: 140 MMHG

## 2020-07-20 DIAGNOSIS — M46.46 DISCITIS OF LUMBAR REGION: ICD-10-CM

## 2020-07-20 DIAGNOSIS — K58.9 IRRITABLE BOWEL SYNDROME WITHOUT DIARRHEA: ICD-10-CM

## 2020-07-20 DIAGNOSIS — F41.9 ANXIETY: ICD-10-CM

## 2020-07-20 DIAGNOSIS — M54.16 SPINAL STENOSIS OF LUMBAR REGION WITH RADICULOPATHY: ICD-10-CM

## 2020-07-20 DIAGNOSIS — E78.2 MODERATE MIXED HYPERLIPIDEMIA NOT REQUIRING STATIN THERAPY: ICD-10-CM

## 2020-07-20 DIAGNOSIS — M48.061 SPINAL STENOSIS OF LUMBAR REGION, UNSPECIFIED WHETHER NEUROGENIC CLAUDICATION PRESENT: ICD-10-CM

## 2020-07-20 DIAGNOSIS — Z87.828 H/O PENETRATING ABDOMINAL TRAUMA: ICD-10-CM

## 2020-07-20 DIAGNOSIS — E53.8 VITAMIN B12 DEFICIENCY: ICD-10-CM

## 2020-07-20 DIAGNOSIS — G31.84 MILD NEUROCOGNITIVE DISORDER: ICD-10-CM

## 2020-07-20 DIAGNOSIS — G89.29 CHRONIC NECK AND BACK PAIN: ICD-10-CM

## 2020-07-20 DIAGNOSIS — M48.061 SPINAL STENOSIS OF LUMBAR REGION WITH RADICULOPATHY: ICD-10-CM

## 2020-07-20 DIAGNOSIS — M54.9 CHRONIC NECK AND BACK PAIN: ICD-10-CM

## 2020-07-20 DIAGNOSIS — D63.8 ANEMIA IN CHRONIC ILLNESS: ICD-10-CM

## 2020-07-20 DIAGNOSIS — N28.89 RENAL MASS: ICD-10-CM

## 2020-07-20 DIAGNOSIS — N28.89 LEFT RENAL MASS: ICD-10-CM

## 2020-07-20 DIAGNOSIS — N40.0 BENIGN PROSTATIC HYPERPLASIA, UNSPECIFIED WHETHER LOWER URINARY TRACT SYMPTOMS PRESENT: ICD-10-CM

## 2020-07-20 DIAGNOSIS — M54.50 CHRONIC LOW BACK PAIN, UNSPECIFIED BACK PAIN LATERALITY, UNSPECIFIED WHETHER SCIATICA PRESENT: ICD-10-CM

## 2020-07-20 DIAGNOSIS — I10 ESSENTIAL HYPERTENSION: ICD-10-CM

## 2020-07-20 DIAGNOSIS — M54.2 CHRONIC NECK AND BACK PAIN: ICD-10-CM

## 2020-07-20 DIAGNOSIS — I27.20 PULMONARY HYPERTENSION: ICD-10-CM

## 2020-07-20 DIAGNOSIS — E78.00 PURE HYPERCHOLESTEROLEMIA: ICD-10-CM

## 2020-07-20 DIAGNOSIS — I63.239 CEREBRAL INFARCTION DUE TO OCCLUSION OF CAROTID ARTERY, UNSPECIFIED BLOOD VESSEL LATERALITY: ICD-10-CM

## 2020-07-20 DIAGNOSIS — I51.7 LVH (LEFT VENTRICULAR HYPERTROPHY): ICD-10-CM

## 2020-07-20 DIAGNOSIS — F01.518 VASCULAR DEMENTIA WITH BEHAVIOR DISTURBANCE: Primary | ICD-10-CM

## 2020-07-20 DIAGNOSIS — G44.209 TENSION-TYPE HEADACHE, NOT INTRACTABLE, UNSPECIFIED CHRONICITY PATTERN: ICD-10-CM

## 2020-07-20 DIAGNOSIS — R94.31 ABNORMAL ECG: ICD-10-CM

## 2020-07-20 DIAGNOSIS — C64.1 MALIGNANT NEOPLASM OF RIGHT KIDNEY: ICD-10-CM

## 2020-07-20 DIAGNOSIS — B18.2 HEP C W/O COMA, CHRONIC: ICD-10-CM

## 2020-07-20 DIAGNOSIS — G21.4 VASCULAR PARKINSONISM: ICD-10-CM

## 2020-07-20 DIAGNOSIS — G89.29 CHRONIC LOW BACK PAIN, UNSPECIFIED BACK PAIN LATERALITY, UNSPECIFIED WHETHER SCIATICA PRESENT: ICD-10-CM

## 2020-07-20 DIAGNOSIS — G89.4 CHRONIC PAIN SYNDROME: ICD-10-CM

## 2020-07-20 DIAGNOSIS — R00.2 PALPITATIONS: ICD-10-CM

## 2020-07-20 DIAGNOSIS — R27.0 ATAXIA: ICD-10-CM

## 2020-07-20 DIAGNOSIS — D63.8 ANEMIA OF CHRONIC DISEASE: ICD-10-CM

## 2020-07-20 PROCEDURE — 99999 PR PBB SHADOW E&M-EST. PATIENT-LVL V: CPT | Mod: PBBFAC,,, | Performed by: PSYCHIATRY & NEUROLOGY

## 2020-07-20 PROCEDURE — 99999 PR PBB SHADOW E&M-EST. PATIENT-LVL V: ICD-10-PCS | Mod: PBBFAC,,, | Performed by: PSYCHIATRY & NEUROLOGY

## 2020-07-20 PROCEDURE — 99214 OFFICE O/P EST MOD 30 MIN: CPT | Mod: S$PBB,,, | Performed by: PSYCHIATRY & NEUROLOGY

## 2020-07-20 PROCEDURE — 99215 OFFICE O/P EST HI 40 MIN: CPT | Mod: PBBFAC | Performed by: PSYCHIATRY & NEUROLOGY

## 2020-07-20 PROCEDURE — 99214 PR OFFICE/OUTPT VISIT, EST, LEVL IV, 30-39 MIN: ICD-10-PCS | Mod: S$PBB,,, | Performed by: PSYCHIATRY & NEUROLOGY

## 2020-07-20 RX ORDER — LAMOTRIGINE 25 MG/1
25 TABLET ORAL 2 TIMES DAILY
Qty: 180 TABLET | Refills: 3 | Status: SHIPPED | OUTPATIENT
Start: 2020-07-20

## 2020-07-20 RX ORDER — MEMANTINE HYDROCHLORIDE 10 MG/1
10 TABLET ORAL 2 TIMES DAILY
Qty: 180 TABLET | Refills: 3 | Status: SHIPPED | OUTPATIENT
Start: 2020-07-20 | End: 2021-07-20

## 2020-07-20 RX ORDER — DONEPEZIL HYDROCHLORIDE 10 MG/1
10 TABLET, FILM COATED ORAL NIGHTLY
Qty: 90 TABLET | Refills: 3 | Status: SHIPPED | OUTPATIENT
Start: 2020-07-20 | End: 2021-07-20
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
## 2020-07-20 NOTE — PATIENT INSTRUCTIONS
Back Care Tips    Caring for your back  These are things you can do to prevent a recurrence of acute back pain and to reduce symptoms from chronic back pain:  · Maintain a healthy weight. If you are overweight, losing weight will help most types of back pain.  · Exercise is an important part of recovery from most types of back pain. The muscles behind and in front of the spine support the back. This means strengthening both the back muscles and the abdominal muscles will provide better support for your spine.   · Swimming and brisk walking are good overall exercises to improve your fitness level.  · Practice safe lifting methods (below).  · Practice good posture when sitting, standing and walking. Avoid prolonged sitting. This puts more stress on the lower back than standing or walking.  · Wear quality shoes with sufficient arch support. Foot and ankle alignment can affect back symptoms. Women should avoid wearing high heels.  · Therapeutic massage can help relax the back muscles without stretching them.  · During the first 24 to 72 hours after an acute injury or flare-up of chronic back pain, apply an ice pack to the painful area for 20 minutes and then remove it for 20 minutes, over a period of 60 to 90 minutes, or several times a day. As a safety precaution, do not use a heating pad at bedtime. Sleeping on a heating pad can lead to skin burns or tissue damage.  · You can alternate ice and heat therapies.  Medications  Talk to your healthcare provider before using medicines, especially if you have other medical problems or are taking other medicines.  · You may use acetaminophen or ibuprofen to control pain, unless your healthcare provider prescribed other pain medicine. If you have chronic conditions like diabetes, liver or kidney disease, stomach ulcers, or gastrointestinal bleeding, or are taking blood thinners, talk with your healthcare provider before taking any medicines.  · Be careful if you are given  prescription pain medicines, narcotics, or medicine for muscle spasm. They can cause drowsiness, affect your coordination, reflexes, and judgment. Do not drive or operate heavy machinery while taking these types of medicines. Take prescription pain medicine only as prescribed by your healthcare provider.  Lumbar stretch  Here is a simple stretching exercise that will help relax muscle spasm and keep your back more limber. If exercise makes your back pain worse, dont do it.  · Lie on your back with your knees bent and both feet on the ground.  · Slowly raise your left knee to your chest as you flatten your lower back against the floor. Hold for 5 seconds.  · Relax and repeat the exercise with your right knee.  · Do 10 of these exercises for each leg.  Safe lifting method  · Dont bend over at the waist to lift an object off the floor.  Instead, bend your knees and hips in a squat.   · Keep your back and head upright  · Hold the object close to your body, directly in front of you.  · Straighten your legs to lift the object.   · Lower the object to the floor in the reverse fashion.  · If you must slide something across the floor, push it.  Posture tips  Sitting  Sit in chairs with straight backs or low-back support. Keep your knees lower than your hips, with your feet flat on the floor.  When driving, sit up straight. Adjust the seat forward so you are not leaning toward the steering wheel.  A small pillow or rolled towel behind your lower back may help if you are driving long distances.   Standing  When standing for long periods, shift most of your weight to one leg at a time. Alternate legs every few minutes.   Sleeping  The best way to sleep is on your side with your knees bent. Put a low pillow under your head to support your neck in a neutral spine position. Avoid thick pillows that bend your neck to one side. Put a pillow between your legs to further relax your lower back. If you sleep on your back, put pillows  under your knees to support your legs in a slightly flexed position. Use a firm mattress. If your mattress sags, replace it, or use a 1/2-inch plywood board under the mattress to add support.  Follow-up care  Follow up with your healthcare provider, or as advised.  If X-rays, a CT scan or an MRI scan were taken, they will be reviewed by a radiologist. You will be notified of any new findings that may affect your care.  Call 911  Seek emergency medical care if any of the following occur:  · Trouble breathing  · Confusion  · Very drowsy  · Fainting or loss of consciousness  · Rapid or very slow heart rate  · Loss of  bowel or bladder control  When to seek medical care  Call your healthcare provider if any of the following occur:  · Pain becomes worse or spreads to your arms or legs  · Weakness or numbness in one or both arms or legs  · Numbness in the groin area  Date Last Reviewed: 6/1/2016  © 6409-8158 [employer]. [address] [place] [address], [place] [address]. All rights reserved. This information is not intended as a substitute for professional medical care. Always follow your healthcare professional's instructions.

## 2020-07-22 ENCOUNTER — TELEPHONE (OUTPATIENT)
Dept: PAIN MEDICINE | Facility: CLINIC | Age: 65
End: 2020-07-22

## 2020-07-29 ENCOUNTER — PATIENT OUTREACH (OUTPATIENT)
Dept: ADMINISTRATIVE | Facility: OTHER | Age: 65
End: 2020-07-29

## 2020-07-30 ENCOUNTER — OFFICE VISIT (OUTPATIENT)
Dept: PAIN MEDICINE | Facility: CLINIC | Age: 65
End: 2020-07-30
Payer: MEDICARE

## 2020-07-30 VITALS
DIASTOLIC BLOOD PRESSURE: 96 MMHG | SYSTOLIC BLOOD PRESSURE: 138 MMHG | HEART RATE: 96 BPM | WEIGHT: 138.69 LBS | BODY MASS INDEX: 18.79 KG/M2 | HEIGHT: 72 IN

## 2020-07-30 DIAGNOSIS — M96.1 FAILED BACK SURGICAL SYNDROME: Primary | ICD-10-CM

## 2020-07-30 DIAGNOSIS — M47.816 LUMBAR FACET ARTHROPATHY: ICD-10-CM

## 2020-07-30 DIAGNOSIS — R29.898 WEAKNESS OF BOTH LOWER EXTREMITIES: ICD-10-CM

## 2020-07-30 DIAGNOSIS — M47.816 LUMBAR SPONDYLOSIS: ICD-10-CM

## 2020-07-30 PROCEDURE — 99999 PR PBB SHADOW E&M-EST. PATIENT-LVL IV: CPT | Mod: PBBFAC,,, | Performed by: ANESTHESIOLOGY

## 2020-07-30 PROCEDURE — 99214 OFFICE O/P EST MOD 30 MIN: CPT | Mod: S$PBB,,, | Performed by: ANESTHESIOLOGY

## 2020-07-30 PROCEDURE — 99214 OFFICE O/P EST MOD 30 MIN: CPT | Mod: PBBFAC | Performed by: ANESTHESIOLOGY

## 2020-07-30 PROCEDURE — 99214 PR OFFICE/OUTPT VISIT, EST, LEVL IV, 30-39 MIN: ICD-10-PCS | Mod: S$PBB,,, | Performed by: ANESTHESIOLOGY

## 2020-07-30 PROCEDURE — 99999 PR PBB SHADOW E&M-EST. PATIENT-LVL IV: ICD-10-PCS | Mod: PBBFAC,,, | Performed by: ANESTHESIOLOGY

## 2020-07-30 NOTE — PROGRESS NOTES
Chief Pain Complaint:  Back Pain        History of Present Illness:   Cooper Pope Jr. is a 64 y.o. male  who is presenting with a chief complaint of Back Pain  . The patient began experiencing this problem insidiously, and the pain has been gradually worsening over the past 8 year(s). The pain is described as throbbing, cramping, aching and heavy and is located in the bilateral lumbar spine. Pain is intermittent and lasts hours. The pain is nonradiating. The patient rates his pain a 9 out of ten and interferes with activities of daily living a 9 out of ten. Pain is exacerbated by flexion/ extension of the lumbar spine, and is improved by rest. Patient reports no prior trauma, prior lumbar surgery L2-5 lumbar fusion >5 years ago.    - pertinent negatives: No fever, No chills, No weight loss, No bladder dysfunction, No bowel dysfunction, No saddle anesthesia  - pertinent positives: generalized nonspecific Lower Extremity weakness bilaterally    - medications, other therapies tried (physical therapy, injections):     >> NSAIDs, Tylenol, Tramadol, Norco, Percocet, oxycodone, gabapentin and flexeril    >> Has previously undergone Physical Therapy    >> Has previously undergone spinal injection/s      Imaging / Labs / Studies (reviewed on 7/30/2020):    Results for orders placed during the hospital encounter of 05/15/17   MRI Lumbar Spine Without Contrast    Narrative MRI LUMBAR SPINE    TECHNIQUE: MRI lumbar spine was performed without contrast. The following sequences were obtained: Localizer; sagittal T1, T2, STIR; axial T1 and T2.    COMPARISON: 2/20/17    FINDINGS:    There are 5 lumbar vertebrae.  Status post posterior instrumented fusion of L3-S1 along with bilateral sacral ala screws.  Disc spacers are noted at L4-L5 and L5-S1.  Vertebral body heights and alignment are maintained.  Metallic artifact limits evaluation.  Visualized bone marrow signal is maintained.   Conus terminates at L2 and appears  unremarkable. Limited evaluation of posterior abdominal structures is unremarkable.  Paraspinal musculature is within normal limits.  Evaluation of sacroiliac joints is unremarkable.    L1-L2: No spinal canal stenosis or neuroforaminal narrowing.    L2-L3: Circumferential disc bulge results in mild spinal canal stenosis.    L3-L4: Circumferential disc bulge results in mild spinal canal stenosis.    L4-L5: Status post posterior decompression. No spinal canal stenosis or neuroforaminal narrowing.    L5-S1: Status post posterior decompression. No spinal canal stenosis or neuroforaminal narrowing.    Impression  Status post lumbosacral fusion.  Mild degenerative changes as above.      Electronically signed by: GABBY DELANEY MD  Date:     05/15/17  Time:    15:13        Results for orders placed during the hospital encounter of 02/20/17   CT Lumbar Spine Without Contrast    Narrative CT lumbar spine    02/20/17 10:50:00    Accession# [id number]    CLINICAL INDICATION: 61 year old M with spondylosis, prior surgery.    TECHNIQUE:     Axial CT images obtained throughout the region of the lumbar spine without intravenous contrast. Axial, sagittal, and coronal reconstructions were performed.    COMPARISON: MRI 01/11/2016    FINDINGS:     There is a transitional sacral sacral segment with partial lumbarization of the S1 vertebral body and a well-formed but small disc at the S1-S2 level. Nomenclature this report consists with prior exam.    Since the prior examination the patient has undergone L4-5 laminectomy with instrumented posterior spinal fusion. Interbody disc space is present at L4-5 and L5-S1 with associated endplate irregularity and sclerosis. No apparent bony fusion of these vertebral bodies at this point in time. There are bilateral pedicle screws and posterior fusion rods spanning L3 through the sacrum. The orthopedic hardware appears intact. There is lucency surrounding the L3 screws bilaterally as well as the large  right sacral screw. Findings favor mechanical loosening or infection. Hardware appears in reasonable position at this time.    The vertebral bodies are stable in height and morphology without evidence of new fracture.  Normal sagittal alignment is preserved.    Spinal canal is narrowed on a developmental basis. It has been at least partially deviation of spinal stenosis at the operative level and L4-5 laminectomy. Probable residual mild spinal canal narrowing at L3-4.      Limited evaluation of the intraspinal contents demonstrates no hematoma or mass.Paraspinal soft tissues exhibit no acute abnormalities.    Impression Post surgical changes prior L4-5 laminectomy with instrumented posterior spinal fusion spanning L3 through the sacrum. There is lucency surrounding the bilateral L3 screws and the right sacral screw, favoring mechanical loosening over infection.    Interval decompression of the spinal canal at L4-5 and L5-S1.    Development narrowing of the spinal canal.    No new large disc herniation or site of spinal stenosis with the limitations of CT.    Please note the patient has a transitional type lumbosacral segment with nomenclature as above.         Electronically signed by: OPAL YOST MD  Date:     02/20/17  Time:    11:35        Results for orders placed during the hospital encounter of 04/11/16   X-Ray Lumbar Spine Complete 5 View    Narrative Lumbar spine series, 5 views.    Clinical indication: Back pain.    Postop changes noted about the abdomen/pelvis.  There is moderate interspace narrowing at L5-S1 consistent with degenerative disk disease.  Mild narrowing at L4 -- L5.  Arthritic lipping noted at L4 -- L5 and L5 -- S1.  No acute lumbar fracture or significant subluxation.    Impression  Multilevel degenerative changes, greatest at L5 -- S1 and to a lesser extent L4 -- L5.  No acute lumbar fracture or significant subluxation.      Electronically signed by: LEO WHITTEN MD  Date:      04/11/16  Time:    21:19        Results for orders placed during the hospital encounter of 05/01/17   X-Ray Lumbar Spine AP And Lateral    Narrative Lumbar spine three views.    Findings: There are postoperative changes of posterior lumbosacral fusion and laminectomy.  No hardware failure or loosening evident.  No vertebral compression fracture or subluxation.    Impression  As above.      Electronically signed by: LEO RAMOS MD  Date:     05/01/17  Time:    14:48      Review of Systems:  CONSTITUTIONAL: patient denies any fever, chills, or weight loss  SKIN: patient denies any rash or itching  RESPIRATORY: patient denies having any shortness of breath  GASTROINTESTINAL: patient denies having any diarrhea, constipation, or bowel incontinence  GENITOURINARY: patient denies having any abnormal bladder function    MUSCULOSKELETAL:  - patient complains of the above noted pain/s (see chief pain complaint)    NEUROLOGICAL:   - pain as above  - strength in Lower extremities is decreased, BILATERALLY  - sensation in Lower extremities is intact, BILATERALLY  - patient denies any loss of bowel or bladder control      PSYCHIATRIC: patient denies any change in mood    Other:  All other systems reviewed and are negative      Physical Exam:  BP (!) 138/96   Pulse 96   Ht 6' (1.829 m)   Wt 62.9 kg (138 lb 10.7 oz)   BMI 18.81 kg/m²  (reviewed on 7/30/2020)  General: Alert and oriented, in no apparent distress.  Gait: normal gait.  Skin: No rashes, No discoloration, No obvious lesions  HEENT: Normocephalic, atraumatic. Pupils equal and round.  Cardiovascular: Regular rate and rhythm , no significant peripheral edema present  Respiratory: Without audible wheezing, without use of accessory muscles of respiration.    Musculoskeletal:  Cervical Spine    - Pain on flexion of cervical spine Absent  - Spurling's Test:  Absent    - Pain on extension of cervical spine Absent  - TTP over the cervical facet joints Absent  -  Cervical facet loading Absent      Lumbar Spine    - Pain on flexion of lumbar spine Absent  - Straight Leg Raise:  Absent    - Pain on extension of lumbar spine Present  - TTP over the lumbar facet joints Present  - Lumbar facet loading Present    -Pain on palpation over the SI joint  Absent  - SEAN: Absent      Neuro:    Strength:  UE R/L: D: 5/5; B: 5/5; T: 5/5; WF: 5/5; WE: 5/5; IO: 5/5;  LE R/L: HF: 5/5, HE: 5/5, KF: 5/5; KE: 5/5; FE: 5/5; FF: 5/5    Extremity Reflexes: Brisk and symmetric throughout.      Extremity Sensory: Sensation to pinprick and temperature symmetric. Proprioception intact.      Psych:  Mood and affect is appropriate      Assessment:    Cooper Pope Jr. is a 64 y.o. year old male who is presenting with   Encounter Diagnoses   Name Primary?    Lumbar facet arthropathy Yes    Lumbar spondylosis     Failed back surgical syndrome        Plan:    1. Interventional: Consider SCS Trial. Patient not interested at this time.    2. Pharmacologic: Patient has been on Oxycodone 30 mg PO BID with Dr Valadez in [place] last prescribed on 3/11/2020. Since then patient has weaned off. He moved back to [place]. Had an extension discussion about opioid medication. External referal to Pain Management.     3. Rehabilitative: Encourage PT.    4. Diagnostic: Lumbar xray reviewed with patient.     5. Follow up: PRN.    20 minutes were spent in this encounter with more than 50% of the time used for counseling and review of the plan.  Imaging / studies reviewed, detailed above.  I discussed in detail the risks, benefits, and alternatives to any and all potential treatment options.  All questions and concerns were fully addressed today in clinic. Medical decision making moderate.    Thank you for the opportunity to assist in the care of this patient.    Best wishes,    Signed:    Doyle Armstrong MD          Disclaimer:  This note may have been prepared using voice recognition software, it may have not  been extensively proofed, as such there could be errors within the text such as sound alike errors.

## 2020-08-17 ENCOUNTER — TELEPHONE (OUTPATIENT)
Dept: PAIN MEDICINE | Facility: CLINIC | Age: 65
End: 2020-08-17

## 2020-08-17 DIAGNOSIS — M96.1 FAILED BACK SURGICAL SYNDROME: ICD-10-CM

## 2020-08-17 DIAGNOSIS — M47.816 LUMBAR SPONDYLOSIS: Primary | ICD-10-CM

## 2020-08-17 NOTE — TELEPHONE ENCOUNTER
----- Message from Efe Gamboa sent at 8/17/2020  1:20 PM CDT -----  Regarding: s wife  Would like a call from nurse in regards to getting an order for mri of the pts back . Please call back at [phone]        Thank You,   Efe Gamboa

## 2020-08-20 DIAGNOSIS — I16.0 HYPERTENSIVE URGENCY: ICD-10-CM

## 2020-08-20 DIAGNOSIS — E53.8 VITAMIN B12 DEFICIENCY: ICD-10-CM

## 2020-08-20 RX ORDER — CLONIDINE HYDROCHLORIDE 0.3 MG/1
TABLET ORAL
Qty: 90 TABLET | Refills: 2 | Status: SHIPPED | OUTPATIENT
Start: 2020-08-20 | End: 2020-12-14

## 2020-08-20 NOTE — TELEPHONE ENCOUNTER
----- Message from Hannah Rizvi sent at 8/20/2020  4:48 PM CDT -----  Regarding: refill  Contact: Cooper  Type:  RX Refill Request    Who Called:Cooper Pope Jr.  Refill or New Rx:Refill   RX Name and Strength:cyanocobalamin (VITAMIN B-12) 1,000 mcg/mL injection  How is the patient currently taking it? (ex. 1XDay):  Is this a 30 day or 90 day RX:  Preferred Pharmacy with phone number:  [address] [address]  Phone: [phone] Fax: [phone]  Local or Mail Order:local   Ordering Provider:Dr Paz   Would the patient rather a call back or a response via [facility]? Call back   Best Call Back Number:[phone]  Additional Information:

## 2020-08-21 RX ORDER — CYANOCOBALAMIN 1000 UG/ML
INJECTION, SOLUTION INTRAMUSCULAR; SUBCUTANEOUS
Qty: 3 ML | Refills: 0 | OUTPATIENT
Start: 2020-08-21

## 2020-08-21 NOTE — TELEPHONE ENCOUNTER
----- Message from Shannan Rico sent at 8/21/2020  3:38 PM CDT -----  Contact: pt  The pt request a call concerning his B12 medication, no additional info given and can be reached at [phone]///[id number]

## 2020-10-12 ENCOUNTER — HOSPITAL ENCOUNTER (INPATIENT)
Facility: HOSPITAL | Age: 65
LOS: 1 days | DRG: 300 | End: 2020-10-13
Attending: EMERGENCY MEDICINE | Admitting: INTERNAL MEDICINE
Payer: MEDICARE

## 2020-10-12 DIAGNOSIS — I71.03 DISSECTION OF THORACOABDOMINAL AORTA: ICD-10-CM

## 2020-10-12 DIAGNOSIS — Z72.0 TOBACCO ABUSE: ICD-10-CM

## 2020-10-12 DIAGNOSIS — I71.011 DISSECTION OF AORTIC ARCH: Primary | ICD-10-CM

## 2020-10-12 DIAGNOSIS — D64.9 ANEMIA, UNSPECIFIED TYPE: ICD-10-CM

## 2020-10-12 DIAGNOSIS — R10.9 ABDOMINAL PAIN: ICD-10-CM

## 2020-10-12 LAB
ABO + RH BLD: NORMAL
ALBUMIN SERPL BCP-MCNC: 3.2 G/DL (ref 3.5–5.2)
ALP SERPL-CCNC: 111 U/L (ref 55–135)
ALT SERPL W/O P-5'-P-CCNC: 15 U/L (ref 10–44)
ANION GAP SERPL CALC-SCNC: 9 MMOL/L (ref 8–16)
APTT BLDCRRT: 28.8 SEC (ref 21–32)
AST SERPL-CCNC: 24 U/L (ref 10–40)
BASOPHILS # BLD AUTO: 0.03 K/UL (ref 0–0.2)
BASOPHILS NFR BLD: 0.5 % (ref 0–1.9)
BILIRUB SERPL-MCNC: 0.9 MG/DL (ref 0.1–1)
BLD GP AB SCN CELLS X3 SERPL QL: NORMAL
BUN SERPL-MCNC: 24 MG/DL (ref 8–23)
CALCIUM SERPL-MCNC: 9 MG/DL (ref 8.7–10.5)
CHLORIDE SERPL-SCNC: 109 MMOL/L (ref 95–110)
CO2 SERPL-SCNC: 24 MMOL/L (ref 23–29)
CREAT SERPL-MCNC: 1.2 MG/DL (ref 0.5–1.4)
DIFFERENTIAL METHOD: ABNORMAL
EOSINOPHIL # BLD AUTO: 0.1 K/UL (ref 0–0.5)
EOSINOPHIL NFR BLD: 1.5 % (ref 0–8)
ERYTHROCYTE [DISTWIDTH] IN BLOOD BY AUTOMATED COUNT: 14.1 % (ref 11.5–14.5)
EST. GFR  (AFRICAN AMERICAN): >60 ML/MIN/1.73 M^2
EST. GFR  (NON AFRICAN AMERICAN): >60 ML/MIN/1.73 M^2
GLUCOSE SERPL-MCNC: 107 MG/DL (ref 70–110)
HCT VFR BLD AUTO: 30 % (ref 40–54)
HGB BLD-MCNC: 9.7 G/DL (ref 14–18)
IMM GRANULOCYTES # BLD AUTO: 0.02 K/UL (ref 0–0.04)
IMM GRANULOCYTES NFR BLD AUTO: 0.3 % (ref 0–0.5)
INR PPP: 1 (ref 0.8–1.2)
LACTATE SERPL-SCNC: 2.1 MMOL/L (ref 0.5–2.2)
LYMPHOCYTES # BLD AUTO: 1 K/UL (ref 1–4.8)
LYMPHOCYTES NFR BLD: 14.3 % (ref 18–48)
MCH RBC QN AUTO: 32.8 PG (ref 27–31)
MCHC RBC AUTO-ENTMCNC: 32.3 G/DL (ref 32–36)
MCV RBC AUTO: 101 FL (ref 82–98)
MONOCYTES # BLD AUTO: 1 K/UL (ref 0.3–1)
MONOCYTES NFR BLD: 15.2 % (ref 4–15)
NEUTROPHILS # BLD AUTO: 4.5 K/UL (ref 1.8–7.7)
NEUTROPHILS NFR BLD: 68.2 % (ref 38–73)
NRBC BLD-RTO: 0 /100 WBC
PLATELET # BLD AUTO: 421 K/UL (ref 150–350)
PMV BLD AUTO: 9 FL (ref 9.2–12.9)
POTASSIUM SERPL-SCNC: 4.5 MMOL/L (ref 3.5–5.1)
PROT SERPL-MCNC: 8.1 G/DL (ref 6–8.4)
PROTHROMBIN TIME: 10.7 SEC (ref 9–12.5)
RBC # BLD AUTO: 2.96 M/UL (ref 4.6–6.2)
SARS-COV-2 RDRP RESP QL NAA+PROBE: NEGATIVE
SODIUM SERPL-SCNC: 142 MMOL/L (ref 136–145)
TROPONIN I SERPL DL<=0.01 NG/ML-MCNC: 0.01 NG/ML (ref 0–0.03)
WBC # BLD AUTO: 6.63 K/UL (ref 3.9–12.7)

## 2020-10-12 PROCEDURE — 83605 ASSAY OF LACTIC ACID: CPT

## 2020-10-12 PROCEDURE — 93010 EKG 12-LEAD: ICD-10-PCS | Mod: ,,, | Performed by: INTERNAL MEDICINE

## 2020-10-12 PROCEDURE — 85610 PROTHROMBIN TIME: CPT

## 2020-10-12 PROCEDURE — 93010 ELECTROCARDIOGRAM REPORT: CPT | Mod: ,,, | Performed by: INTERNAL MEDICINE

## 2020-10-12 PROCEDURE — 20000000 HC ICU ROOM

## 2020-10-12 PROCEDURE — 80053 COMPREHEN METABOLIC PANEL: CPT

## 2020-10-12 PROCEDURE — 63600175 PHARM REV CODE 636 W HCPCS: Performed by: EMERGENCY MEDICINE

## 2020-10-12 PROCEDURE — 93005 ELECTROCARDIOGRAM TRACING: CPT

## 2020-10-12 PROCEDURE — 96375 TX/PRO/DX INJ NEW DRUG ADDON: CPT

## 2020-10-12 PROCEDURE — U0002 COVID-19 LAB TEST NON-CDC: HCPCS

## 2020-10-12 PROCEDURE — 63600175 PHARM REV CODE 636 W HCPCS: Performed by: INTERNAL MEDICINE

## 2020-10-12 PROCEDURE — 86850 RBC ANTIBODY SCREEN: CPT

## 2020-10-12 PROCEDURE — 99291 CRITICAL CARE FIRST HOUR: CPT | Mod: 25

## 2020-10-12 PROCEDURE — 85025 COMPLETE CBC W/AUTO DIFF WBC: CPT

## 2020-10-12 PROCEDURE — 85730 THROMBOPLASTIN TIME PARTIAL: CPT

## 2020-10-12 PROCEDURE — 84484 ASSAY OF TROPONIN QUANT: CPT

## 2020-10-12 PROCEDURE — 96376 TX/PRO/DX INJ SAME DRUG ADON: CPT

## 2020-10-12 PROCEDURE — 96374 THER/PROPH/DIAG INJ IV PUSH: CPT

## 2020-10-12 PROCEDURE — 25500020 PHARM REV CODE 255: Performed by: EMERGENCY MEDICINE

## 2020-10-12 PROCEDURE — 25000003 PHARM REV CODE 250: Performed by: EMERGENCY MEDICINE

## 2020-10-12 RX ORDER — LABETALOL HYDROCHLORIDE 5 MG/ML
10 INJECTION, SOLUTION INTRAVENOUS
Status: COMPLETED | OUTPATIENT
Start: 2020-10-12 | End: 2020-10-12

## 2020-10-12 RX ORDER — GLUCAGON 1 MG
1 KIT INJECTION
Status: DISCONTINUED | OUTPATIENT
Start: 2020-10-12 | End: 2020-10-13 | Stop reason: HOSPADM

## 2020-10-12 RX ORDER — MORPHINE SULFATE 4 MG/ML
4 INJECTION, SOLUTION INTRAMUSCULAR; INTRAVENOUS ONCE
Status: DISCONTINUED | OUTPATIENT
Start: 2020-10-13 | End: 2020-10-12

## 2020-10-12 RX ORDER — MORPHINE SULFATE 4 MG/ML
4 INJECTION, SOLUTION INTRAMUSCULAR; INTRAVENOUS
Status: COMPLETED | OUTPATIENT
Start: 2020-10-12 | End: 2020-10-12

## 2020-10-12 RX ORDER — HYDROMORPHONE HYDROCHLORIDE 2 MG/ML
1 INJECTION, SOLUTION INTRAMUSCULAR; INTRAVENOUS; SUBCUTANEOUS
Status: DISCONTINUED | OUTPATIENT
Start: 2020-10-13 | End: 2020-10-13 | Stop reason: HOSPADM

## 2020-10-12 RX ORDER — HYDROMORPHONE HYDROCHLORIDE 2 MG/ML
1 INJECTION, SOLUTION INTRAMUSCULAR; INTRAVENOUS; SUBCUTANEOUS EVERY 6 HOURS PRN
Status: DISCONTINUED | OUTPATIENT
Start: 2020-10-12 | End: 2020-10-12

## 2020-10-12 RX ORDER — HYDROMORPHONE HYDROCHLORIDE 1 MG/ML
1 INJECTION, SOLUTION INTRAMUSCULAR; INTRAVENOUS; SUBCUTANEOUS ONCE
Status: COMPLETED | OUTPATIENT
Start: 2020-10-13 | End: 2020-10-13

## 2020-10-12 RX ORDER — ESMOLOL HYDROCHLORIDE 20 MG/ML
50 INJECTION, SOLUTION INTRAVENOUS CONTINUOUS
Status: DISCONTINUED | OUTPATIENT
Start: 2020-10-12 | End: 2020-10-13 | Stop reason: HOSPADM

## 2020-10-12 RX ORDER — SODIUM CHLORIDE 0.9 % (FLUSH) 0.9 %
10 SYRINGE (ML) INJECTION
Status: DISCONTINUED | OUTPATIENT
Start: 2020-10-12 | End: 2020-10-13 | Stop reason: HOSPADM

## 2020-10-12 RX ORDER — IBUPROFEN 200 MG
24 TABLET ORAL
Status: DISCONTINUED | OUTPATIENT
Start: 2020-10-12 | End: 2020-10-13 | Stop reason: HOSPADM

## 2020-10-12 RX ORDER — IBUPROFEN 200 MG
16 TABLET ORAL
Status: DISCONTINUED | OUTPATIENT
Start: 2020-10-12 | End: 2020-10-13 | Stop reason: HOSPADM

## 2020-10-12 RX ORDER — ONDANSETRON 2 MG/ML
4 INJECTION INTRAMUSCULAR; INTRAVENOUS ONCE
Status: COMPLETED | OUTPATIENT
Start: 2020-10-12 | End: 2020-10-12

## 2020-10-12 RX ORDER — ESMOLOL HYDROCHLORIDE 20 MG/ML
50 INJECTION, SOLUTION INTRAVENOUS CONTINUOUS
Status: DISCONTINUED | OUTPATIENT
Start: 2020-10-12 | End: 2020-10-12

## 2020-10-12 RX ADMIN — LABETALOL HYDROCHLORIDE 10 MG: 5 INJECTION, SOLUTION INTRAVENOUS at 07:10

## 2020-10-12 RX ADMIN — LABETALOL HYDROCHLORIDE 10 MG: 5 INJECTION, SOLUTION INTRAVENOUS at 04:10

## 2020-10-12 RX ADMIN — ONDANSETRON 4 MG: 2 INJECTION INTRAMUSCULAR; INTRAVENOUS at 04:10

## 2020-10-12 RX ADMIN — HYDROMORPHONE HYDROCHLORIDE 1 MG: 2 INJECTION, SOLUTION INTRAMUSCULAR; INTRAVENOUS; SUBCUTANEOUS at 10:10

## 2020-10-12 RX ADMIN — MORPHINE SULFATE 4 MG: 4 INJECTION INTRAVENOUS at 08:10

## 2020-10-12 RX ADMIN — IOHEXOL 100 ML: 350 INJECTION, SOLUTION INTRAVENOUS at 06:10

## 2020-10-12 RX ADMIN — MORPHINE SULFATE 4 MG: 4 INJECTION INTRAVENOUS at 05:10

## 2020-10-12 RX ADMIN — ESMOLOL HYDROCHLORIDE 275 MCG/KG/MIN: 20 INJECTION, SOLUTION INTRAVENOUS at 11:10

## 2020-10-12 RX ADMIN — LABETALOL HYDROCHLORIDE 10 MG: 5 INJECTION, SOLUTION INTRAVENOUS at 05:10

## 2020-10-12 RX ADMIN — ESMOLOL HYDROCHLORIDE 50 MCG/KG/MIN: 20 INJECTION, SOLUTION INTRAVENOUS at 08:10

## 2020-10-12 RX ADMIN — MORPHINE SULFATE 4 MG: 4 INJECTION INTRAVENOUS at 04:10
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
## 2020-10-13 ENCOUNTER — HOSPITAL ENCOUNTER (INPATIENT)
Facility: HOSPITAL | Age: 65
LOS: 3 days | Discharge: HOME | DRG: 301 | End: 2020-10-16
Attending: EMERGENCY MEDICINE | Admitting: SURGERY
Payer: MEDICARE

## 2020-10-13 VITALS
TEMPERATURE: 99 F | OXYGEN SATURATION: 99 % | BODY MASS INDEX: 16.93 KG/M2 | WEIGHT: 125 LBS | DIASTOLIC BLOOD PRESSURE: 66 MMHG | HEIGHT: 72 IN | RESPIRATION RATE: 16 BRPM | SYSTOLIC BLOOD PRESSURE: 111 MMHG | HEART RATE: 73 BPM

## 2020-10-13 DIAGNOSIS — Z01.818 ENCOUNTER FOR OTHER PREPROCEDURAL EXAMINATION: ICD-10-CM

## 2020-10-13 DIAGNOSIS — I71.00 AORTIC ANEURYSM WITH DISSECTION: Primary | ICD-10-CM

## 2020-10-13 DIAGNOSIS — T14.8XXA INTRAMURAL HEMATOMA: ICD-10-CM

## 2020-10-13 DIAGNOSIS — I10 ESSENTIAL HYPERTENSION: ICD-10-CM

## 2020-10-13 PROBLEM — I71.011: Status: ACTIVE | Noted: 2020-10-13

## 2020-10-13 PROBLEM — I71.02 DISSECTION OF ABDOMINAL AORTA: Status: ACTIVE | Noted: 2020-10-13

## 2020-10-13 LAB
ABO + RH BLD: NORMAL
ABO + RH BLD: NORMAL
ANION GAP SERPL CALC-SCNC: 7 MMOL/L (ref 8–16)
AORTIC ROOT ANNULUS: 3.47 CM
ASCENDING AORTA: 4.11 CM
AV INDEX (PROSTH): 0.64
AV MEAN GRADIENT: 7 MMHG
AV PEAK GRADIENT: 13 MMHG
AV VALVE AREA: 2.48 CM2
AV VELOCITY RATIO: 0.74
BASOPHILS # BLD AUTO: 0.02 K/UL (ref 0–0.2)
BASOPHILS NFR BLD: 0.3 % (ref 0–1.9)
BLD GP AB SCN CELLS X3 SERPL QL: NORMAL
BLD GP AB SCN CELLS X3 SERPL QL: NORMAL
BSA FOR ECHO PROCEDURE: 1.7 M2
BUN SERPL-MCNC: 19 MG/DL (ref 8–23)
CALCIUM SERPL-MCNC: 8.8 MG/DL (ref 8.7–10.5)
CHLORIDE SERPL-SCNC: 108 MMOL/L (ref 95–110)
CO2 SERPL-SCNC: 24 MMOL/L (ref 23–29)
CREAT SERPL-MCNC: 1.1 MG/DL (ref 0.5–1.4)
CRP SERPL-MCNC: 239.4 MG/L (ref 0–8.2)
CV ECHO LV RWT: 0.72 CM
DIFFERENTIAL METHOD: ABNORMAL
DOP CALC AO PEAK VEL: 1.78 M/S
DOP CALC AO VTI: 38.5 CM
DOP CALC LVOT AREA: 3.9 CM2
DOP CALC LVOT DIAMETER: 2.23 CM
DOP CALC LVOT PEAK VEL: 1.31 M/S
DOP CALC LVOT STROKE VOLUME: 95.6 CM3
DOP CALC RVOT PEAK VEL: 0.65 M/S
DOP CALC RVOT VTI: 13.08 CM
DOP CALCLVOT PEAK VEL VTI: 24.49 CM
E WAVE DECELERATION TIME: 219.44 MSEC
E/A RATIO: 0.86
E/E' RATIO: 7.43 M/S
ECHO LV POSTERIOR WALL: 1.38 CM (ref 0.6–1.1)
EOSINOPHIL # BLD AUTO: 0.1 K/UL (ref 0–0.5)
EOSINOPHIL NFR BLD: 1.2 % (ref 0–8)
ERYTHROCYTE [DISTWIDTH] IN BLOOD BY AUTOMATED COUNT: 13.6 % (ref 11.5–14.5)
ERYTHROCYTE [SEDIMENTATION RATE] IN BLOOD BY WESTERGREN METHOD: >120 MM/HR (ref 0–23)
EST. GFR  (AFRICAN AMERICAN): >60 ML/MIN/1.73 M^2
EST. GFR  (NON AFRICAN AMERICAN): >60 ML/MIN/1.73 M^2
FRACTIONAL SHORTENING: 17 % (ref 28–44)
GLUCOSE SERPL-MCNC: 111 MG/DL (ref 70–110)
HCT VFR BLD AUTO: 25.6 % (ref 40–54)
HGB BLD-MCNC: 8.4 G/DL (ref 14–18)
IMM GRANULOCYTES # BLD AUTO: 0.02 K/UL (ref 0–0.04)
IMM GRANULOCYTES NFR BLD AUTO: 0.3 % (ref 0–0.5)
INTERVENTRICULAR SEPTUM: 1.49 CM (ref 0.6–1.1)
IVRT: 99.9 MSEC
LA MAJOR: 4.32 CM
LA MINOR: 3.61 CM
LA WIDTH: 4.73 CM
LACTATE SERPL-SCNC: 0.7 MMOL/L (ref 0.5–2.2)
LEFT ATRIUM SIZE: 2.4 CM
LEFT ATRIUM VOLUME INDEX: 21.7 ML/M2
LEFT ATRIUM VOLUME: 37.95 CM3
LEFT INTERNAL DIMENSION IN SYSTOLE: 3.16 CM (ref 2.1–4)
LEFT VENTRICLE DIASTOLIC VOLUME INDEX: 36.05 ML/M2
LEFT VENTRICLE DIASTOLIC VOLUME: 63.08 ML
LEFT VENTRICLE MASS INDEX: 117 G/M2
LEFT VENTRICLE SYSTOLIC VOLUME INDEX: 22.7 ML/M2
LEFT VENTRICLE SYSTOLIC VOLUME: 39.7 ML
LEFT VENTRICULAR INTERNAL DIMENSION IN DIASTOLE: 3.83 CM (ref 3.5–6)
LEFT VENTRICULAR MASS: 204.09 G
LV LATERAL E/E' RATIO: 6.5 M/S
LV SEPTAL E/E' RATIO: 8.67 M/S
LYMPHOCYTES # BLD AUTO: 0.8 K/UL (ref 1–4.8)
LYMPHOCYTES NFR BLD: 12.7 % (ref 18–48)
MCH RBC QN AUTO: 32.7 PG (ref 27–31)
MCHC RBC AUTO-ENTMCNC: 32.8 G/DL (ref 32–36)
MCV RBC AUTO: 100 FL (ref 82–98)
MONOCYTES # BLD AUTO: 0.8 K/UL (ref 0.3–1)
MONOCYTES NFR BLD: 13.9 % (ref 4–15)
MV PEAK A VEL: 0.91 M/S
MV PEAK E VEL: 0.78 M/S
MV STENOSIS PRESSURE HALF TIME: 63.64 MS
MV VALVE AREA P 1/2 METHOD: 3.46 CM2
NEUTROPHILS # BLD AUTO: 4.2 K/UL (ref 1.8–7.7)
NEUTROPHILS NFR BLD: 71.6 % (ref 38–73)
NRBC BLD-RTO: 0 /100 WBC
PISA TR MAX VEL: 2.9 M/S
PLATELET # BLD AUTO: 347 K/UL (ref 150–350)
PMV BLD AUTO: 8.1 FL (ref 9.2–12.9)
POTASSIUM SERPL-SCNC: 4.3 MMOL/L (ref 3.5–5.1)
PV MEAN GRADIENT: 0.6 MMHG
RA MAJOR: 5.22 CM
RA PRESSURE: 15 MMHG
RBC # BLD AUTO: 2.57 M/UL (ref 4.6–6.2)
SINUS: 3.82 CM
SODIUM SERPL-SCNC: 139 MMOL/L (ref 136–145)
STJ: 2.98 CM
TDI LATERAL: 0.12 M/S
TDI SEPTAL: 0.09 M/S
TDI: 0.11 M/S
TR MAX PG: 34 MMHG
TRICUSPID ANNULAR PLANE SYSTOLIC EXCURSION: 2.27 CM
TV REST PULMONARY ARTERY PRESSURE: 49 MMHG
WBC # BLD AUTO: 5.91 K/UL (ref 3.9–12.7)

## 2020-10-13 PROCEDURE — 63600175 PHARM REV CODE 636 W HCPCS: Performed by: INTERNAL MEDICINE

## 2020-10-13 PROCEDURE — 96375 TX/PRO/DX INJ NEW DRUG ADDON: CPT

## 2020-10-13 PROCEDURE — 63600175 PHARM REV CODE 636 W HCPCS: Performed by: PHYSICIAN ASSISTANT

## 2020-10-13 PROCEDURE — 96374 THER/PROPH/DIAG INJ IV PUSH: CPT

## 2020-10-13 PROCEDURE — 25000003 PHARM REV CODE 250: Performed by: EMERGENCY MEDICINE

## 2020-10-13 PROCEDURE — 99222 PR INITIAL HOSPITAL CARE,LEVL II: ICD-10-PCS | Mod: GC,,, | Performed by: SURGERY

## 2020-10-13 PROCEDURE — 36415 COLL VENOUS BLD VENIPUNCTURE: CPT

## 2020-10-13 PROCEDURE — 86140 C-REACTIVE PROTEIN: CPT

## 2020-10-13 PROCEDURE — 86900 BLOOD TYPING SEROLOGIC ABO: CPT

## 2020-10-13 PROCEDURE — 99291 CRITICAL CARE FIRST HOUR: CPT

## 2020-10-13 PROCEDURE — 86850 RBC ANTIBODY SCREEN: CPT

## 2020-10-13 PROCEDURE — 12000002 HC ACUTE/MED SURGE SEMI-PRIVATE ROOM

## 2020-10-13 PROCEDURE — 80048 BASIC METABOLIC PNL TOTAL CA: CPT

## 2020-10-13 PROCEDURE — 86901 BLOOD TYPING SEROLOGIC RH(D): CPT

## 2020-10-13 PROCEDURE — 25000003 PHARM REV CODE 250: Performed by: INTERNAL MEDICINE

## 2020-10-13 PROCEDURE — 25000003 PHARM REV CODE 250

## 2020-10-13 PROCEDURE — 94761 N-INVAS EAR/PLS OXIMETRY MLT: CPT

## 2020-10-13 PROCEDURE — 25000003 PHARM REV CODE 250: Performed by: PHYSICIAN ASSISTANT

## 2020-10-13 PROCEDURE — 99291 CRITICAL CARE FIRST HOUR: CPT | Mod: ,,, | Performed by: PHYSICIAN ASSISTANT

## 2020-10-13 PROCEDURE — 63600175 PHARM REV CODE 636 W HCPCS: Performed by: STUDENT IN AN ORGANIZED HEALTH CARE EDUCATION/TRAINING PROGRAM

## 2020-10-13 PROCEDURE — 25000003 PHARM REV CODE 250: Performed by: STUDENT IN AN ORGANIZED HEALTH CARE EDUCATION/TRAINING PROGRAM

## 2020-10-13 PROCEDURE — 27000221 HC OXYGEN, UP TO 24 HOURS

## 2020-10-13 PROCEDURE — 85652 RBC SED RATE AUTOMATED: CPT

## 2020-10-13 PROCEDURE — 99222 1ST HOSP IP/OBS MODERATE 55: CPT | Mod: GC,,, | Performed by: SURGERY

## 2020-10-13 PROCEDURE — 63600175 PHARM REV CODE 636 W HCPCS: Performed by: EMERGENCY MEDICINE

## 2020-10-13 PROCEDURE — 63600175 PHARM REV CODE 636 W HCPCS: Performed by: NURSE PRACTITIONER

## 2020-10-13 PROCEDURE — 85025 COMPLETE CBC W/AUTO DIFF WBC: CPT

## 2020-10-13 PROCEDURE — 83605 ASSAY OF LACTIC ACID: CPT

## 2020-10-13 PROCEDURE — 99291 PR CRITICAL CARE, E/M 30-74 MINUTES: ICD-10-PCS | Mod: ,,, | Performed by: PHYSICIAN ASSISTANT

## 2020-10-13 RX ORDER — NICARDIPINE HYDROCHLORIDE 0.2 MG/ML
1 INJECTION INTRAVENOUS CONTINUOUS
Status: DISCONTINUED | OUTPATIENT
Start: 2020-10-13 | End: 2020-10-13

## 2020-10-13 RX ORDER — MORPHINE SULFATE 4 MG/ML
4 INJECTION, SOLUTION INTRAMUSCULAR; INTRAVENOUS EVERY 4 HOURS PRN
Status: DISCONTINUED | OUTPATIENT
Start: 2020-10-13 | End: 2020-10-16 | Stop reason: HOSPADM

## 2020-10-13 RX ORDER — NICARDIPINE HYDROCHLORIDE 0.2 MG/ML
1 INJECTION INTRAVENOUS CONTINUOUS
Status: DISCONTINUED | OUTPATIENT
Start: 2020-10-13 | End: 2020-10-13 | Stop reason: HOSPADM

## 2020-10-13 RX ORDER — NICARDIPINE HYDROCHLORIDE 0.2 MG/ML
INJECTION INTRAVENOUS
Status: COMPLETED
Start: 2020-10-13 | End: 2020-10-13

## 2020-10-13 RX ORDER — NICARDIPINE HYDROCHLORIDE 0.2 MG/ML
1 INJECTION INTRAVENOUS CONTINUOUS
Status: DISCONTINUED | OUTPATIENT
Start: 2020-10-13 | End: 2020-10-15

## 2020-10-13 RX ORDER — HYDROMORPHONE HYDROCHLORIDE 1 MG/ML
1 INJECTION, SOLUTION INTRAMUSCULAR; INTRAVENOUS; SUBCUTANEOUS
Status: COMPLETED | OUTPATIENT
Start: 2020-10-13 | End: 2020-10-13

## 2020-10-13 RX ORDER — HEPARIN SODIUM 5000 [USP'U]/ML
5000 INJECTION, SOLUTION INTRAVENOUS; SUBCUTANEOUS EVERY 8 HOURS
Status: DISCONTINUED | OUTPATIENT
Start: 2020-10-13 | End: 2020-10-16 | Stop reason: HOSPADM

## 2020-10-13 RX ORDER — ESMOLOL HYDROCHLORIDE 20 MG/ML
50 INJECTION, SOLUTION INTRAVENOUS CONTINUOUS
Status: DISCONTINUED | OUTPATIENT
Start: 2020-10-13 | End: 2020-10-15

## 2020-10-13 RX ORDER — ESMOLOL HYDROCHLORIDE 20 MG/ML
INJECTION, SOLUTION INTRAVENOUS
Status: DISPENSED
Start: 2020-10-13 | End: 2020-10-13

## 2020-10-13 RX ORDER — OXYCODONE HYDROCHLORIDE 5 MG/1
5 TABLET ORAL EVERY 6 HOURS PRN
Status: DISCONTINUED | OUTPATIENT
Start: 2020-10-13 | End: 2020-10-16 | Stop reason: HOSPADM

## 2020-10-13 RX ORDER — SODIUM CHLORIDE 0.9 % (FLUSH) 0.9 %
10 SYRINGE (ML) INJECTION
Status: DISCONTINUED | OUTPATIENT
Start: 2020-10-13 | End: 2020-10-16 | Stop reason: HOSPADM

## 2020-10-13 RX ORDER — ESMOLOL HYDROCHLORIDE 20 MG/ML
50 INJECTION, SOLUTION INTRAVENOUS CONTINUOUS
Status: DISCONTINUED | OUTPATIENT
Start: 2020-10-13 | End: 2020-10-13

## 2020-10-13 RX ORDER — ONDANSETRON 2 MG/ML
4 INJECTION INTRAMUSCULAR; INTRAVENOUS
Status: COMPLETED | OUTPATIENT
Start: 2020-10-13 | End: 2020-10-13

## 2020-10-13 RX ADMIN — HYDROMORPHONE HYDROCHLORIDE 1 MG: 1 INJECTION, SOLUTION INTRAMUSCULAR; INTRAVENOUS; SUBCUTANEOUS at 12:10

## 2020-10-13 RX ADMIN — NICARDIPINE HYDROCHLORIDE 2.5 MG/HR: 0.2 INJECTION, SOLUTION INTRAVENOUS at 12:10

## 2020-10-13 RX ADMIN — ESMOLOL HYDROCHLORIDE 275 MCG/KG/MIN: 20 INJECTION, SOLUTION INTRAVENOUS at 05:10

## 2020-10-13 RX ADMIN — HYDROMORPHONE HYDROCHLORIDE 1 MG: 2 INJECTION INTRAMUSCULAR; INTRAVENOUS; SUBCUTANEOUS at 02:10

## 2020-10-13 RX ADMIN — ESMOLOL HYDROCHLORIDE 225 MCG/KG/MIN: 20 INJECTION INTRAVENOUS at 09:10

## 2020-10-13 RX ADMIN — MORPHINE SULFATE 4 MG: 4 INJECTION INTRAVENOUS at 08:10

## 2020-10-13 RX ADMIN — ESMOLOL HYDROCHLORIDE 225 MCG/KG/MIN: 20 INJECTION INTRAVENOUS at 06:10

## 2020-10-13 RX ADMIN — ESMOLOL HYDROCHLORIDE 250 MCG/KG/MIN: 20 INJECTION, SOLUTION INTRAVENOUS at 03:10

## 2020-10-13 RX ADMIN — NICARDIPINE HYDROCHLORIDE 15 MG/HR: 0.2 INJECTION, SOLUTION INTRAVENOUS at 07:10

## 2020-10-13 RX ADMIN — HYDROMORPHONE HYDROCHLORIDE 1 MG: 1 INJECTION, SOLUTION INTRAMUSCULAR; INTRAVENOUS; SUBCUTANEOUS at 03:10

## 2020-10-13 RX ADMIN — NICARDIPINE HYDROCHLORIDE 15 MG/HR: 0.2 INJECTION, SOLUTION INTRAVENOUS at 05:10

## 2020-10-13 RX ADMIN — ESMOLOL HYDROCHLORIDE 225 MCG/KG/MIN: 20 INJECTION INTRAVENOUS at 02:10

## 2020-10-13 RX ADMIN — NICARDIPINE HYDROCHLORIDE 15 MG/HR: 0.2 INJECTION, SOLUTION INTRAVENOUS at 04:10

## 2020-10-13 RX ADMIN — HYDROMORPHONE HYDROCHLORIDE 1 MG: 2 INJECTION INTRAMUSCULAR; INTRAVENOUS; SUBCUTANEOUS at 05:10

## 2020-10-13 RX ADMIN — ESMOLOL HYDROCHLORIDE 300 MCG/KG/MIN: 20 INJECTION, SOLUTION INTRAVENOUS at 01:10

## 2020-10-13 RX ADMIN — ESMOLOL HYDROCHLORIDE 50 MCG/KG/MIN: 20 INJECTION INTRAVENOUS at 09:10

## 2020-10-13 RX ADMIN — HYDROMORPHONE HYDROCHLORIDE 1 MG: 1 INJECTION, SOLUTION INTRAMUSCULAR; INTRAVENOUS; SUBCUTANEOUS at 11:10

## 2020-10-13 RX ADMIN — NICARDIPINE HYDROCHLORIDE 2.5 MG/HR: 0.2 INJECTION, SOLUTION INTRAVENOUS at 01:10

## 2020-10-13 RX ADMIN — HYDROMORPHONE HYDROCHLORIDE 1 MG: 1 INJECTION, SOLUTION INTRAMUSCULAR; INTRAVENOUS; SUBCUTANEOUS at 10:10

## 2020-10-13 RX ADMIN — ESMOLOL HYDROCHLORIDE 75 MCG/KG/MIN: 20 INJECTION INTRAVENOUS at 10:10

## 2020-10-13 RX ADMIN — ESMOLOL HYDROCHLORIDE 275 MCG/KG/MIN: 20 INJECTION, SOLUTION INTRAVENOUS at 07:10

## 2020-10-13 RX ADMIN — ONDANSETRON 4 MG: 2 INJECTION INTRAMUSCULAR; INTRAVENOUS at 10:10
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
## 2020-10-13 NOTE — PROGRESS NOTES
eICU Note    Subjective: Aneurysm with intramural hematoma -arch and desc aorta  Recent diagnosis - had left AMA and now with abd pain  n esmolol to control BP      Objective:BP (!) 148/101   Pulse 77   Temp 99.1 °F (37.3 °C) (Oral)   Resp 18   Ht 6' (1.829 m)   Wt 57.1 kg (125 lb 14.1 oz)   SpO2 96%   BMI 17.07 kg/m²     Data review done     Video review done   Asleep    Assessment:  Dissecting aneurysm  Goal SBP below 120 mmHg      Plan:  1 BP managent    2Endovascular surgery consult  3 Monitor Hb    DVT prophylaxis SCD  GI prophylaxis na  Ventilator settings na    Communication with RN    0124 Get arterial line  Add on nicardipine- volume concentrate    0244 Increased nicardipine to 15 mg/h

## 2020-10-13 NOTE — PROGRESS NOTES
VASCULAR SURGERY    Seen on PM rounds.  Told pt that we should place an A-line but he refused.  Very upset that he has not had his dinner yet.  BP and HR under good control w/ tele and cuff measurements..  Cardene running while I was in room.  No chest pain.    Jose L Montiel MD  General Surgery, PGY-3  [phone]
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
## 2020-10-13 NOTE — ASSESSMENT & PLAN NOTE
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  Will discuss case stat  with vascular surgery /CVT since this is a Type A dissection-involves the aortic arch   Will continue BP control - on esmolol drip .  Needs close monitoring

## 2020-10-13 NOTE — ED TRIAGE NOTES
Pt transferred for [facility] for evaluation of AAA.  Pt c/o pain to LUQ.  Pt arrives with Esmolol and Cardene infusion.
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
## 2020-10-13 NOTE — ED NOTES
Patient room in ED BED 4, unable to move patient on board, waiting for patient chart to be discharged from [facility]

## 2020-10-13 NOTE — HPI
"64 year old man with history of  dementia, colon polyp, essential HTN, HLD, stroke, and renal cell cancer who presents to the Emergency Department for evaluation. He was recently admitted to the ICU at [facility] for"aortic aneurysm on 10/3/2020"  but left AMA.  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    In the ED,case was discussed with Vascular surgery who recommended BP control and ICU admission.      ED vitals -  BP:   (!) 170/111     Pulse:   82     Resp: 18 16 18     He was started on esmolol drip .       "
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
## 2020-10-13 NOTE — PROGRESS NOTES
Report called to ANDRES Calix at [facility]- ED. Awaiting ambulance transportation to arrive.

## 2020-10-13 NOTE — CARE UPDATE
Case discussed with [facility] .  Dr Reyes has accepted the patient .  He will be transferred to [facility] for Type A aortic dissection .  Family informed.

## 2020-10-13 NOTE — PROGRESS NOTES
Arterial line insertion attempted via right radial twice, left radial twice, and right brachial without any success of threading needle. Pt now adamantly refusing to be stuck again. BP slowly starting to come down with addition of cardene gtt. Will continue to monitor.

## 2020-10-13 NOTE — PROGRESS NOTES
[facility] here for pt transport to [place]. Pt called his wife on his cell phone to let her know he was leaving soon.  She and other family are leaving [place] and headed to [place] to meet there.  Vasoactive gtts infusing to PIV on R arm, no edema, pain, redness, or leaking.  PIV to L arm flushes easily, saline locked.  Pt on 3L nc, no respiratory distress.  Pt asleep, arouses easily, oriented x 4.  Voided per urinal at bedside, 200cc clear yellow urine.  Medics transferring iv infusions and monitoring.

## 2020-10-13 NOTE — PROGRESS NOTES
Pt screaming and crying out in pain, with SBP sustaining 140s on now max dose esmolol gtt. Hospital medicine notifed - order received for additional dose of dilaudid now, and increased prn dilaudid frequency to q3h. Advised to give pain med and see if BP goes down with better pain control.

## 2020-10-13 NOTE — ASSESSMENT & PLAN NOTE
Cooper Pope . is a 64 y.o. male w/ IMH beginning at left subclavian takeoff and extending into abdomen.  Also evidence of subacute/chronic dissection in abdominal aorta, possibly a ruptured RANDA w/ a retrograde bleed.  At any rate, will admit to ICU, strict BP control.    -HR <70, SBP<120 for impulse control  -Will begin his HTN home meds and begin gtts.  -Monitor for chest pain, denies any right now  -Will rescan in 48 hours, sooner if w/ chest pain  -Okay for diet today
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
## 2020-10-13 NOTE — HPI
64-year-old male with multiple comorbidities including vascular dementia, hypertension, CS/PS VR, renal cell carcinoma, history of CVA presents as a transfer from [facility] for a dissection of the aortic arch.  Patient was apparently admitted to the ICU at [place] 10/3/2020 for an aortic aneurysm but left AMA.  He re-presented to the ED last night for lower abdominal pain and was transferred here for cardiac surgery evaluation.  Presently, patient complains of bilateral abdominal pain, worse on the right side.  Pain radiates up to right side of his chest.  He cannot clearly identify either provoking or palliating factors.  He also complains of numbness in his feet for about 2 days.  Sources generalized weakness but no focal weakness. He takes aspirin, no other blood thinners.

## 2020-10-13 NOTE — ASSESSMENT & PLAN NOTE
64-year-old male with multiple comorbidities including vascular dementia, hypertension, CS/PS VR, renal cell carcinoma, history of CVA presents as a transfer from [facility] for a dissection of the aortic arch. Recently left AMA from their facility for the same issue. Patient seen and examined by Dr. Van and images reviewed by Dr. Reyes. Patient has a DeBakey type two dissection which is treated like a type B. Recommend consulting vascular surgery. No intervention from CTS at this time.

## 2020-10-13 NOTE — ED NOTES
Appearance:  Pt awake, alert & oriented to person, place & time.  Pt with intermittent confusion   Skin:  Skin warm, dry & intact.  Mucous membranes moist.  Skin turgor normal.  Respiratory:  Respirations even, non-labored.    Neurologic:  Pt moving all extremities without difficulty.  Sensation intact.     Peripheral Vascular:  All peripheral pulses present.  Abdomen:  Abdomen soft.  Generalized tenderness.
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
## 2020-10-13 NOTE — PROGRESS NOTES
Pt ripped out PIV that esmolol was infusing. Now SBP 150s-160s on max dose esmolol gtt. eICU MD Medrano notified - order received to start cardene gtt and insert arterial line for accurate BP monitoring.

## 2020-10-13 NOTE — PROGRESS NOTES
Pt arrived to ICU#12 with esmolol gtt infusing at 250mg. Placed on cont monitor. AAOx4, very talkative, c/o 10/10 R sided abd pain/R sided CP. Refusing to put on hospital gown at this time. SCDs placed, then pt quickly screamed to take them off. Oriented to room/equipment; call light and urinal placed within reach.Pt called wife on personal phone to update on admission to ICU

## 2020-10-13 NOTE — PLAN OF CARE
Pt admitted overnight for aortic dissection; currently awaiting transport to [facility]-. Esmolol and cardene gtts cont per order to maintain SBP<120. Afebrile. 3L NC. C/o frequent right sided abd and chest pain -treated and relieved with dilaudid q3h. PIV intact x2. Voids per urinal. Turns/repositions in bed independently. POC reviewed with pt, VU but needs reinforcement. Wife updated via phone.

## 2020-10-14 LAB
ANION GAP SERPL CALC-SCNC: 11 MMOL/L (ref 8–16)
BASOPHILS # BLD AUTO: 0.01 K/UL (ref 0–0.2)
BASOPHILS NFR BLD: 0.1 % (ref 0–1.9)
BUN SERPL-MCNC: 18 MG/DL (ref 8–23)
CALCIUM SERPL-MCNC: 9.4 MG/DL (ref 8.7–10.5)
CHLORIDE SERPL-SCNC: 109 MMOL/L (ref 95–110)
CO2 SERPL-SCNC: 19 MMOL/L (ref 23–29)
CREAT SERPL-MCNC: 1 MG/DL (ref 0.5–1.4)
DIFFERENTIAL METHOD: ABNORMAL
EOSINOPHIL # BLD AUTO: 0.1 K/UL (ref 0–0.5)
EOSINOPHIL NFR BLD: 1 % (ref 0–8)
ERYTHROCYTE [DISTWIDTH] IN BLOOD BY AUTOMATED COUNT: 14 % (ref 11.5–14.5)
EST. GFR  (AFRICAN AMERICAN): >60 ML/MIN/1.73 M^2
EST. GFR  (NON AFRICAN AMERICAN): >60 ML/MIN/1.73 M^2
GLUCOSE SERPL-MCNC: 110 MG/DL (ref 70–110)
HCT VFR BLD AUTO: 28.6 % (ref 40–54)
HGB BLD-MCNC: 9 G/DL (ref 14–18)
IMM GRANULOCYTES # BLD AUTO: 0.02 K/UL (ref 0–0.04)
IMM GRANULOCYTES NFR BLD AUTO: 0.3 % (ref 0–0.5)
LYMPHOCYTES # BLD AUTO: 0.9 K/UL (ref 1–4.8)
LYMPHOCYTES NFR BLD: 12.3 % (ref 18–48)
MCH RBC QN AUTO: 32.4 PG (ref 27–31)
MCHC RBC AUTO-ENTMCNC: 31.5 G/DL (ref 32–36)
MCV RBC AUTO: 103 FL (ref 82–98)
MONOCYTES # BLD AUTO: 0.7 K/UL (ref 0.3–1)
MONOCYTES NFR BLD: 10.3 % (ref 4–15)
NEUTROPHILS # BLD AUTO: 5.3 K/UL (ref 1.8–7.7)
NEUTROPHILS NFR BLD: 76 % (ref 38–73)
NRBC BLD-RTO: 0 /100 WBC
PLATELET # BLD AUTO: 454 K/UL (ref 150–350)
PMV BLD AUTO: 8.4 FL (ref 9.2–12.9)
POTASSIUM SERPL-SCNC: 4.1 MMOL/L (ref 3.5–5.1)
RBC # BLD AUTO: 2.78 M/UL (ref 4.6–6.2)
SODIUM SERPL-SCNC: 139 MMOL/L (ref 136–145)
WBC # BLD AUTO: 6.99 K/UL (ref 3.9–12.7)

## 2020-10-14 PROCEDURE — 25000003 PHARM REV CODE 250: Performed by: STUDENT IN AN ORGANIZED HEALTH CARE EDUCATION/TRAINING PROGRAM

## 2020-10-14 PROCEDURE — 94761 N-INVAS EAR/PLS OXIMETRY MLT: CPT

## 2020-10-14 PROCEDURE — 85025 COMPLETE CBC W/AUTO DIFF WBC: CPT

## 2020-10-14 PROCEDURE — 63600175 PHARM REV CODE 636 W HCPCS: Performed by: STUDENT IN AN ORGANIZED HEALTH CARE EDUCATION/TRAINING PROGRAM

## 2020-10-14 PROCEDURE — 99233 SBSQ HOSP IP/OBS HIGH 50: CPT | Mod: ,,, | Performed by: SURGERY

## 2020-10-14 PROCEDURE — 20000000 HC ICU ROOM

## 2020-10-14 PROCEDURE — 80048 BASIC METABOLIC PNL TOTAL CA: CPT

## 2020-10-14 PROCEDURE — 87040 BLOOD CULTURE FOR BACTERIA: CPT

## 2020-10-14 PROCEDURE — 99233 PR SUBSEQUENT HOSPITAL CARE,LEVL III: ICD-10-PCS | Mod: ,,, | Performed by: SURGERY

## 2020-10-14 PROCEDURE — 87040 BLOOD CULTURE FOR BACTERIA: CPT | Mod: 59

## 2020-10-14 RX ORDER — MEMANTINE HYDROCHLORIDE 5 MG/1
10 TABLET ORAL 2 TIMES DAILY
Status: DISCONTINUED | OUTPATIENT
Start: 2020-10-14 | End: 2020-10-16 | Stop reason: HOSPADM

## 2020-10-14 RX ORDER — ASPIRIN 81 MG/1
81 TABLET ORAL DAILY
Status: DISCONTINUED | OUTPATIENT
Start: 2020-10-14 | End: 2020-10-16 | Stop reason: HOSPADM

## 2020-10-14 RX ORDER — VANCOMYCIN HCL IN 5 % DEXTROSE 1G/250ML
1000 PLASTIC BAG, INJECTION (ML) INTRAVENOUS
Status: DISCONTINUED | OUTPATIENT
Start: 2020-10-16 | End: 2020-10-14

## 2020-10-14 RX ORDER — AMLODIPINE BESYLATE 10 MG/1
10 TABLET ORAL DAILY
Status: DISCONTINUED | OUTPATIENT
Start: 2020-10-14 | End: 2020-10-16 | Stop reason: HOSPADM

## 2020-10-14 RX ORDER — ESCITALOPRAM OXALATE 10 MG/1
10 TABLET ORAL NIGHTLY
Status: DISCONTINUED | OUTPATIENT
Start: 2020-10-14 | End: 2020-10-16 | Stop reason: HOSPADM

## 2020-10-14 RX ORDER — METOPROLOL SUCCINATE 100 MG/1
100 TABLET, EXTENDED RELEASE ORAL DAILY
Status: DISCONTINUED | OUTPATIENT
Start: 2020-10-14 | End: 2020-10-16 | Stop reason: HOSPADM

## 2020-10-14 RX ORDER — CYPROHEPTADINE HYDROCHLORIDE 4 MG/1
4 TABLET ORAL 3 TIMES DAILY PRN
Status: DISCONTINUED | OUTPATIENT
Start: 2020-10-14 | End: 2020-10-16 | Stop reason: HOSPADM

## 2020-10-14 RX ORDER — ATORVASTATIN CALCIUM 10 MG/1
10 TABLET, FILM COATED ORAL DAILY
Status: DISCONTINUED | OUTPATIENT
Start: 2020-10-14 | End: 2020-10-16 | Stop reason: HOSPADM

## 2020-10-14 RX ORDER — LAMOTRIGINE 25 MG/1
25 TABLET ORAL 2 TIMES DAILY
Status: DISCONTINUED | OUTPATIENT
Start: 2020-10-14 | End: 2020-10-16 | Stop reason: HOSPADM

## 2020-10-14 RX ORDER — TAMSULOSIN HYDROCHLORIDE 0.4 MG/1
0.4 CAPSULE ORAL DAILY
Status: DISCONTINUED | OUTPATIENT
Start: 2020-10-14 | End: 2020-10-16 | Stop reason: HOSPADM

## 2020-10-14 RX ORDER — DONEPEZIL HYDROCHLORIDE 5 MG/1
10 TABLET, FILM COATED ORAL NIGHTLY
Status: DISCONTINUED | OUTPATIENT
Start: 2020-10-14 | End: 2020-10-16 | Stop reason: HOSPADM

## 2020-10-14 RX ORDER — CLONIDINE HYDROCHLORIDE 0.1 MG/1
0.3 TABLET ORAL 3 TIMES DAILY
Status: DISCONTINUED | OUTPATIENT
Start: 2020-10-14 | End: 2020-10-16 | Stop reason: HOSPADM

## 2020-10-14 RX ORDER — HYDROCHLOROTHIAZIDE 12.5 MG/1
12.5 TABLET ORAL DAILY
Status: DISCONTINUED | OUTPATIENT
Start: 2020-10-14 | End: 2020-10-15

## 2020-10-14 RX ADMIN — NICARDIPINE HYDROCHLORIDE 15 MG/HR: 0.2 INJECTION, SOLUTION INTRAVENOUS at 12:10

## 2020-10-14 RX ADMIN — ESMOLOL HYDROCHLORIDE 225 MCG/KG/MIN: 20 INJECTION INTRAVENOUS at 02:10

## 2020-10-14 RX ADMIN — CLONIDINE HYDROCHLORIDE 0.3 MG: 0.3 TABLET ORAL at 02:10

## 2020-10-14 RX ADMIN — MEMANTINE HYDROCHLORIDE 10 MG: 10 TABLET ORAL at 12:10

## 2020-10-14 RX ADMIN — ESMOLOL HYDROCHLORIDE 225 MCG/KG/MIN: 20 INJECTION INTRAVENOUS at 10:10

## 2020-10-14 RX ADMIN — NICARDIPINE HYDROCHLORIDE 15 MG/HR: 0.2 INJECTION, SOLUTION INTRAVENOUS at 05:10

## 2020-10-14 RX ADMIN — AMLODIPINE BESYLATE 10 MG: 10 TABLET ORAL at 12:10

## 2020-10-14 RX ADMIN — ESMOLOL HYDROCHLORIDE 50 MCG/KG/MIN: 20 INJECTION INTRAVENOUS at 11:10

## 2020-10-14 RX ADMIN — ESCITALOPRAM OXALATE 10 MG: 10 TABLET ORAL at 08:10

## 2020-10-14 RX ADMIN — NICARDIPINE HYDROCHLORIDE 15 MG/HR: 0.2 INJECTION, SOLUTION INTRAVENOUS at 09:10

## 2020-10-14 RX ADMIN — NICARDIPINE HYDROCHLORIDE 15 MG/HR: 0.2 INJECTION, SOLUTION INTRAVENOUS at 02:10

## 2020-10-14 RX ADMIN — HEPARIN SODIUM 5000 UNITS: 5000 INJECTION INTRAVENOUS; SUBCUTANEOUS at 02:10

## 2020-10-14 RX ADMIN — METOPROLOL SUCCINATE 100 MG: 100 TABLET, EXTENDED RELEASE ORAL at 12:10

## 2020-10-14 RX ADMIN — ESMOLOL HYDROCHLORIDE 225 MCG/KG/MIN: 20 INJECTION INTRAVENOUS at 12:10

## 2020-10-14 RX ADMIN — DONEPEZIL HYDROCHLORIDE 10 MG: 5 TABLET, FILM COATED ORAL at 08:10

## 2020-10-14 RX ADMIN — ASPIRIN 81 MG: 81 TABLET, COATED ORAL at 12:10

## 2020-10-14 RX ADMIN — MORPHINE SULFATE 4 MG: 4 INJECTION INTRAVENOUS at 12:10

## 2020-10-14 RX ADMIN — ATORVASTATIN CALCIUM 10 MG: 10 TABLET, FILM COATED ORAL at 12:10

## 2020-10-14 RX ADMIN — OXYCODONE 5 MG: 5 TABLET ORAL at 07:10

## 2020-10-14 RX ADMIN — LAMOTRIGINE 25 MG: 25 TABLET ORAL at 09:10

## 2020-10-14 RX ADMIN — TAMSULOSIN HYDROCHLORIDE 0.4 MG: 0.4 CAPSULE ORAL at 12:10

## 2020-10-14 RX ADMIN — MEMANTINE HYDROCHLORIDE 10 MG: 10 TABLET ORAL at 08:10

## 2020-10-14 RX ADMIN — HEPARIN SODIUM 5000 UNITS: 5000 INJECTION INTRAVENOUS; SUBCUTANEOUS at 05:10

## 2020-10-14 RX ADMIN — NICARDIPINE HYDROCHLORIDE 7.5 MG/HR: 0.2 INJECTION, SOLUTION INTRAVENOUS at 03:10

## 2020-10-14 RX ADMIN — ESMOLOL HYDROCHLORIDE 225 MCG/KG/MIN: 20 INJECTION INTRAVENOUS at 07:10

## 2020-10-14 RX ADMIN — MORPHINE SULFATE 4 MG: 4 INJECTION INTRAVENOUS at 09:10

## 2020-10-14 RX ADMIN — MORPHINE SULFATE 4 MG: 4 INJECTION INTRAVENOUS at 04:10

## 2020-10-14 RX ADMIN — HEPARIN SODIUM 5000 UNITS: 5000 INJECTION INTRAVENOUS; SUBCUTANEOUS at 09:10

## 2020-10-14 RX ADMIN — MORPHINE SULFATE 4 MG: 4 INJECTION INTRAVENOUS at 02:10

## 2020-10-14 RX ADMIN — LAMOTRIGINE 25 MG: 25 TABLET ORAL at 02:10

## 2020-10-14 RX ADMIN — CLONIDINE HYDROCHLORIDE 0.3 MG: 0.3 TABLET ORAL at 08:10

## 2020-10-14 RX ADMIN — HYDROCHLOROTHIAZIDE 12.5 MG: 12.5 TABLET ORAL at 02:10

## 2020-10-14 NOTE — ASSESSMENT & PLAN NOTE
Cooper Pope . is a 64 y.o. male w/ IMH beginning at left subclavian takeoff and extending into abdomen.  Also evidence of subacute/chronic dissection in abdominal aorta, possibly a ruptured RANDA w/ a retrograde bleed. Pending transfer to ICU due to bed availability    -HR <70, SBP<120 for impulse control  -OK for PO  -Restarting some home BP meds (previously non-compliant, per report was only on clonidine)  -Monitor for chest pain, denies any right now  -Will rescan in 24 hours, sooner if w/ chest pain

## 2020-10-14 NOTE — ASSESSMENT & PLAN NOTE
Cooper Pope . is a 64 y.o. male w/ PMHX of RCC, CVA, Jehovan's witness, presents with/ IMH beginning at left subclavian takeoff and extending into abdomen.  admitted to ICU, strict BP control.      Neuro/Psych:   -- Pain:oxycodone 5mg q6 PRN , morphine 4mg Q4h             Cards:   -- HDS  -HR <70, SBP<120 for impulse control  -Reports he only takes clonidine at home.  Will start on oral labetalol for now w/ titratable gtts  - refuses arterial line, but BP have been good on non-invasive cuff   -vascular will rescan in 48 hours, sooner if w/ chest pain  --per vascular send rate and CRP for some stranding around aorta just proximal to iliac bifurcation to r/o infectious etiology     Pulm:   - on RA  -- Goal O2 sat > 90%        Renal:  -- Keep tariq for strict I/O  -- BUN/Cr: 18/1.0      FEN / GI:   -- Replace lytes as needed;   -- Nutrition: cardiac diet         ID:   -- Tm: afebrile; WBC 6.9      Heme/Onc:   -- H/H stable   -- minimize blood draws due to his Sabianism beliefs       Endo:   -- Gluc goal 140-180  -- SSI      PPx:   Feeding: cardiac   Analgesia/Sedation:   Thromboembolic prevention: lovenox   HOB >30:   Stress Ulcer ppx: protonix   Glucose control: Critical care goal 140-180 g/dl, ISS     -SICU/Full code
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
## 2020-10-14 NOTE — NURSING TRANSFER
Nursing Transfer Note      10/14/2020     Transfer From: ED    Transfer via bed    Transfer with cardiac monitoring    Transported by RN    Medicines sent: N/A    Chart send with patient: Yes    Notified: family    Patient reassessed at: 1515 10/14/2020    Upon arrival to floor: cardiac monitor applied, patient oriented to room, call bell in reach and bed in lowest position    Skin assessment: Pt free from skin breakdown. R hand swollen with redness and warmth around IV site. IV removed. MD notified.

## 2020-10-14 NOTE — PLAN OF CARE
10/14/20 0803   Final Note   Assessment Type Final Discharge Note   Anticipated Discharge Disposition [facility]   Right Care Referral Info   Facility Name [facility]

## 2020-10-14 NOTE — HPI
64-year-old male with mPMHx including dementia, hypertension, Hep C s/p SVR, renal cell carcinoma, history of CVA presents as a transfer from [facility] for a dissection of the aortic arch.  Patient was apparently admitted to the ICU at [facility] 10/3/2020 for an aortic aneurysm but left AMA.  He re-presented to the ED in  for lower abdominal pain and was transferred here for cardiac surgery evaluation.  on presentation, patient complains of bilateral abdominal pain, worse on the right side with right sided CP.  Apparently his symptoms started over a week ago with syncopal episode after hugging a family member at a .  He takes aspirin, no other blood thinners. HPI per chart review and limited information from patient.

## 2020-10-14 NOTE — ASSESSMENT & PLAN NOTE
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  Will discuss case stat  with vascular surgery /CVT since this is a Type A dissection-involves the aortic arch   Will continue BP control - on esmolol drip .  Needs close monitoring     -Patient has been accepted by Dr Reyes and will be transferred to [facility].

## 2020-10-14 NOTE — HOSPITAL COURSE
Patient was managed in the ICU and was on esmolol and Nicardipine drip . Case was discussed with CVT and patient will be transferred to [facility] for Type A aortic dissection.

## 2020-10-14 NOTE — DISCHARGE SUMMARY
"[facility] Hospital Medicine  Discharge Summary      Patient Name: Cooper Pope Jr.  MRN: [id number]  Admission Date: 10/12/2020  Hospital Length of Stay: 1 days  Discharge Date and Time: 10/13/2020  7:30 AM  Attending Physician: Andrew Liang MD   Discharging Provider: Anand Arango MD  Primary Care Provider: Ghazal Paz MD      HPI:   64 year old man with history of  dementia, colon polyp, essential HTN, HLD, stroke, and renal cell cancer who presents to the Emergency Department for evaluation. He was recently admitted to the ICU at [facility] for"aortic aneurysm on 10/3/2020"  but left AMA.  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    In the ED,case was discussed with Vascular surgery who recommended BP control and ICU admission.      ED vitals -  BP:   (!) 170/111     Pulse:   82     Resp: 18 16 18     He was started on esmolol drip .         * No surgery found *      Hospital Course:   Patient was managed in the ICU and was on esmolol and Nicardipine drip . Case was discussed with CVT and patient will be transferred to [facility] for Type A aortic dissection.      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  Will discuss case stat  with vascular surgery /CVT since this is a Type A dissection-involves the aortic arch   Will continue BP control - on esmolol drip .  Needs " close monitoring     -Patient has been accepted by Dr Reyes and will be transferred to [facility].       Final Active Diagnoses:    Diagnosis Date Noted POA    PRINCIPAL PROBLEM:  Dissection of aortic arch [I71.01] 10/13/2020 Yes    Abdominal pain [R10.9] 10/12/2020 Yes    Vascular dementia with behavior disturbance [F01.51] 07/25/2019 Yes    Anemia of chronic disease [D63.8] 04/23/2016 Yes    Essential hypertension [I10] 02/06/2014 Yes      Problems Resolved During this Admission:       Discharged Condition: stable    Disposition: Critical Access Hospital*    Follow Up:    Patient Instructions:      Diet NPO     Activity as tolerated       Significant Diagnostic Studies: Labs:   CBC   Recent Labs   Lab 10/12/20  1610 10/13/20  0534   WBC 6.63 5.91   HGB 9.7* 8.4*   HCT 30.0* 25.6*   * 347       Pending Diagnostic Studies:     None         Medications:  Reconciled Home Medications:      Medication List      CHANGE how you take these medications    HYDROcodone-acetaminophen 7.5-325 mg per tablet  Commonly known as: NORCO  Take 1 tablet by mouth every 8 hours  What changed: Another medication with the same name was removed. Continue taking this medication, and follow the directions you see here.        CONTINUE taking these medications    amLODIPine 10 MG tablet  Commonly known as: NORVASC  Take 1 tablet (10 mg total) by mouth once daily.     aspirin 81 MG EC tablet  Commonly known as: ECOTRIN  Take 1 tablet (81 mg total) by mouth once daily.     atorvastatin 10 MG tablet  Commonly known as: LIPITOR  Take 1 tablet (10 mg total) by mouth once daily.     benazepriL 40 MG tablet  Commonly known as: LOTENSIN  Take 1 tablet (40 mg total) by mouth once daily.     blood pressure monitor Kit  Commonly known as: BLOOD PRESSURE KIT  1 Units by Misc.(Non-Drug; Combo Route) route once daily.     cloNIDine 0.3 MG tablet  Commonly known as: CATAPRES  TAKE 1 TABLET BY MOUTH THREE TIMES DAILY     cyanocobalamin 1,000  mcg/mL injection  Commonly known as: VITAMIN B-12  Inject 1 mL (1,000 mcg total) into the muscle every 30 days.     cyproheptadine 4 mg tablet  Commonly known as: PERIACTIN  Take 1 tablet (4 mg total) by mouth 3 (three) times daily as needed (for appetite).     diclofenac sodium 1 % Gel  Commonly known as: VOLTAREN  Apply 2 grams to affected area four times a day     donepeziL 10 MG tablet  Commonly known as: ARICEPT  Take 1 tablet (10 mg total) by mouth every evening.     escitalopram oxalate 10 MG tablet  Commonly known as: LEXAPRO  Take 1 tablet (10 mg total) by mouth every evening.     hydroCHLOROthiazide 12.5 MG Tab  Commonly known as: HYDRODIURIL  Take 1 tablet (12.5 mg total) by mouth once daily.     lamoTRIgine 25 MG tablet  Commonly known as: LAMICTAL  Take 1 tablet (25 mg total) by mouth 2 (two) times daily.     meloxicam 15 MG tablet  Commonly known as: MOBIC  Take 1 tablet by mouth once a day as needed for pain     memantine 10 MG Tab  Commonly known as: NAMENDA  Take 1 tablet (10 mg total) by mouth 2 (two) times daily.     metoprolol succinate 50 MG 24 hr tablet  Commonly known as: TOPROL-XL  Take 2 tablets (100 mg total) by mouth once daily.     multivit-iron-FA-calcium-mins 9 mg iron-400 mcg Tab tablet  Take 1 tablet by mouth once daily.     promethazine 25 MG tablet  Commonly known as: PHENERGAN  TAKE 1 TABLET BY MOUTH TWICE DAILY AS NEEDED FOR NAUSEA     tamsulosin 0.4 mg Cap  Commonly known as: FLOMAX  Take 1 capsule (0.4 mg total) by mouth once daily.     tiZANidine 4 MG tablet  Commonly known as: ZANAFLEX  Take 1-2 tablet by mouth at bedtime            Indwelling Lines/Drains at time of discharge:   Lines/Drains/Airways     None                 Time spent on the discharge of patient:  45 minutes  Patient was seen and examined on the date of discharge and determined to be suitable for discharge.       Anand Arango MD  Department of Hospital Medicine  [facility]

## 2020-10-14 NOTE — PLAN OF CARE
Pt admit to SICU during shift. All VSS. No complaints of pain. See flowsheets for I/Os and assessments.    Pt remains free from falls, injuries, skin breakdown. Positions self independently. POC reviewed.

## 2020-10-14 NOTE — PLAN OF CARE
CM was unable to assess pt prior to transfer to [facility].  Reviewed chart to complete partial assessment.  Pt transferred to [facility] due to type A aortic dissection.      10/13/20 1500   Discharge Assessment   Assessment Type Discharge Planning Assessment   Assessment information obtained from? Medical Record   Expected Length of Stay (days)   (Pt transferring for higher level of care)   Prior to hospitilization cognitive status: Alert/Oriented;No Deficits   Prior to hospitalization functional status: Independent   Current cognitive status: Alert/Oriented;No Deficits   Current Functional Status: Independent   Facility Arrived From: home   Patient currently being followed by outpatient case management? Unable to determine (comments)

## 2020-10-14 NOTE — SUBJECTIVE & OBJECTIVE
Medications:  Continuous Infusions:   esmolol 225 mcg/kg/min (10/14/20 0730)    nicardipine 15 mg/hr (10/14/20 0931)     Scheduled Meds:   heparin (porcine)  5,000 Units Subcutaneous Q8H     PRN Meds:morphine, oxyCODONE, sodium chloride 0.9%     Objective:     Vital Signs (Most Recent):  Temp: 98.7 °F (37.1 °C) (10/14/20 0730)  Pulse: 72 (10/14/20 1002)  Resp: 19 (10/14/20 1002)  BP: 114/64 (10/14/20 1002)  SpO2: 96 % (10/14/20 1002) Vital Signs (24h Range):  Temp:  [98.5 °F (36.9 °C)-98.7 °F (37.1 °C)] 98.7 °F (37.1 °C)  Pulse:  [68-86] 72  Resp:  [11-33] 19  SpO2:  [94 %-100 %] 96 %  BP: (103-149)/(60-92) 114/64         Physical Exam  Constitutional:       Appearance: Normal appearance.   HENT:      Head: Normocephalic.      Mouth/Throat:      Mouth: Mucous membranes are moist.   Cardiovascular:      Rate and Rhythm: Normal rate and regular rhythm.      Pulses: Normal pulses.   Pulmonary:      Effort: Pulmonary effort is normal.   Musculoskeletal:         General: No swelling or deformity.   Skin:     General: Skin is warm.   Neurological:      Mental Status: He is alert.         Significant Labs:  BMP:   Recent Labs   Lab 10/14/20  0405         K 4.1      CO2 19*   BUN 18   CREATININE 1.0   CALCIUM 9.4     CBC:   Recent Labs   Lab 10/14/20  0405   WBC 6.99   RBC 2.78*   HGB 9.0*   HCT 28.6*   *   *   MCH 32.4*   MCHC 31.5*       Significant Diagnostics:  I have reviewed all pertinent imaging results/findings within the past 24 hours.

## 2020-10-14 NOTE — ED NOTES
"I assume care of patient at this time from night nurse. Upon RN entering room pt sleeping comfortably. Pt awakens easily to verbal stimuli. Pt currently AAOX4 speaking in clear full sentences appropriately and following commands. Respirations are spontaneous, even and unlabored w/ no distress noted, tolerating room air well. Skin is warm and dry. Pt refuses to put on hospital gown states," I don't want to put that on, I'm more comfortable in my own stuff". Pt reports constant " back and side pains but that's been going on". Ordered infusions of: Cardene @ 15mg/hr and Esmolol @ 225mcg/kg/min continue to infuse through patent and secured IV, dressing is clean dry and intact w/ no redness or swelling noted to IV site. Pt remains on cardiac monitor, continuous pulse oximetry and automatic blood pressure cuff cycling w/ alarms set. Bed placed in low locked position, side rails up x 2, call light is within reach of patient or family, alarms set and turned on for monitor and pulse ox, will continue to monitor.     Two patient identifiers have been checked and are correct.      Appearance: Pt awake, alert & oriented to person, place & time. Pt in no acute distress at present time. Pt is clean and well groomed with clothes appropriately fastened.   Skin: Skin warm, dry & intact. Color consistent with ethnicity. Mucous membranes moist. No breakdown or brusing noted.   Musculoskeletal: Patient moving all extremities well, no obvious swelling or deformities noted. + bilateral upper and lower back pains reported rated 5/10 on pain scale.   Respiratory: Respirations spontaneous, even, and non-labored. Visible chest rise noted. Airway is open and patent. No accessory muscle use noted.   Neurologic: Sensation is intact. Speech is clear and appropriate. Eyes open spontaneously, behavior appropriate to situation, follows commands, facial expression symmetrical, bilateral hand grasp equal and even, purposeful motor response " "noted.  Cardiac: All peripheral pulses present. No Bilateral lower extremity edema. Cap refill is <3 seconds. Denies CP, SOB, dizziness, weakness, fatigue, N/V or vision changes at this time.   Abdomen: Abdomen soft, +tender to palpation in LUQ w/ pain described as," Stabbing pains" rated 5/10 on pain scale.   : Pt reports no dysuria or hematuria.       Fall risk band and allergy band applied to patient.           "

## 2020-10-14 NOTE — ED NOTES
Patient has removed himself from the monitor and sitting in a chair in the hallway.  Patient is crying and states he just wants to get some sunshine.  Patient was escorted back to his room with security.
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
## 2020-10-14 NOTE — PROGRESS NOTES
[facility]  Vascular Surgery  Progress Note    Patient Name: Cooper Pope Jr.  MRN: [id number]  Admission Date: 10/13/2020  Primary Care Provider: Ghazal Paz MD    Subjective:     Interval History: No acute events overnight.  Tolerating PO.  No chest pain.  BP well controlled on high dose esmolol and cardine, HR in 70s    Post-Op Info:  * No surgery found *           Medications:  Continuous Infusions:   esmolol 225 mcg/kg/min (10/14/20 0730)    nicardipine 15 mg/hr (10/14/20 0931)     Scheduled Meds:   heparin (porcine)  5,000 Units Subcutaneous Q8H     PRN Meds:morphine, oxyCODONE, sodium chloride 0.9%     Objective:     Vital Signs (Most Recent):  Temp: 98.7 °F (37.1 °C) (10/14/20 0730)  Pulse: 72 (10/14/20 1002)  Resp: 19 (10/14/20 1002)  BP: 114/64 (10/14/20 1002)  SpO2: 96 % (10/14/20 1002) Vital Signs (24h Range):  Temp:  [98.5 °F (36.9 °C)-98.7 °F (37.1 °C)] 98.7 °F (37.1 °C)  Pulse:  [68-86] 72  Resp:  [11-33] 19  SpO2:  [94 %-100 %] 96 %  BP: (103-149)/(60-92) 114/64         Physical Exam  Constitutional:       Appearance: Normal appearance.   HENT:      Head: Normocephalic.      Mouth/Throat:      Mouth: Mucous membranes are moist.   Cardiovascular:      Rate and Rhythm: Normal rate and regular rhythm.      Pulses: Normal pulses.   Pulmonary:      Effort: Pulmonary effort is normal.   Musculoskeletal:         General: No swelling or deformity.   Skin:     General: Skin is warm.   Neurological:      Mental Status: He is alert.         Significant Labs:  BMP:   Recent Labs   Lab 10/14/20  0405         K 4.1      CO2 19*   BUN 18   CREATININE 1.0   CALCIUM 9.4     CBC:   Recent Labs   Lab 10/14/20  0405   WBC 6.99   RBC 2.78*   HGB 9.0*   HCT 28.6*   *   *   MCH 32.4*   MCHC 31.5*       Significant Diagnostics:  I have reviewed all pertinent imaging results/findings within the past 24 hours.    Assessment/Plan:     * Intramural  hematoma  Cooper Pope Jr. is a 64 y.o. male w/ IMH beginning at left subclavian takeoff and extending into abdomen.  Also evidence of subacute/chronic dissection in abdominal aorta, possibly a ruptured RANDA w/ a retrograde bleed. Pending transfer to ICU due to bed availability    -HR <70, SBP<120 for impulse control  -OK for PO  -Restarting some home BP meds (previously non-compliant, per report was only on clonidine)  -Monitor for chest pain, denies any right now  -Will rescan in 24 hours, sooner if w/ chest pain          Tk Arora MD  Vascular Surgery  [facility]

## 2020-10-15 LAB
ALBUMIN SERPL BCP-MCNC: 2.4 G/DL (ref 3.5–5.2)
ALP SERPL-CCNC: 76 U/L (ref 55–135)
ALT SERPL W/O P-5'-P-CCNC: 9 U/L (ref 10–44)
ANION GAP SERPL CALC-SCNC: 7 MMOL/L (ref 8–16)
ANISOCYTOSIS BLD QL SMEAR: SLIGHT
ANISOCYTOSIS BLD QL SMEAR: SLIGHT
AST SERPL-CCNC: 15 U/L (ref 10–40)
BASOPHILS # BLD AUTO: 0.01 K/UL (ref 0–0.2)
BASOPHILS # BLD AUTO: 0.01 K/UL (ref 0–0.2)
BASOPHILS # BLD AUTO: 0.02 K/UL (ref 0–0.2)
BASOPHILS NFR BLD: 0.2 % (ref 0–1.9)
BASOPHILS NFR BLD: 0.2 % (ref 0–1.9)
BASOPHILS NFR BLD: 0.3 % (ref 0–1.9)
BILIRUB SERPL-MCNC: 0.8 MG/DL (ref 0.1–1)
BUN SERPL-MCNC: 20 MG/DL (ref 8–23)
CALCIUM SERPL-MCNC: 8.4 MG/DL (ref 8.7–10.5)
CHLORIDE SERPL-SCNC: 109 MMOL/L (ref 95–110)
CO2 SERPL-SCNC: 22 MMOL/L (ref 23–29)
CREAT SERPL-MCNC: 1.1 MG/DL (ref 0.5–1.4)
DIFFERENTIAL METHOD: ABNORMAL
EOSINOPHIL # BLD AUTO: 0 K/UL (ref 0–0.5)
EOSINOPHIL # BLD AUTO: 0 K/UL (ref 0–0.5)
EOSINOPHIL # BLD AUTO: 0.1 K/UL (ref 0–0.5)
EOSINOPHIL NFR BLD: 0.7 % (ref 0–8)
EOSINOPHIL NFR BLD: 0.7 % (ref 0–8)
EOSINOPHIL NFR BLD: 0.9 % (ref 0–8)
ERYTHROCYTE [DISTWIDTH] IN BLOOD BY AUTOMATED COUNT: 13.9 % (ref 11.5–14.5)
ERYTHROCYTE [DISTWIDTH] IN BLOOD BY AUTOMATED COUNT: 14.1 % (ref 11.5–14.5)
ERYTHROCYTE [DISTWIDTH] IN BLOOD BY AUTOMATED COUNT: 14.1 % (ref 11.5–14.5)
EST. GFR  (AFRICAN AMERICAN): >60 ML/MIN/1.73 M^2
EST. GFR  (NON AFRICAN AMERICAN): >60 ML/MIN/1.73 M^2
GLUCOSE SERPL-MCNC: 108 MG/DL (ref 70–110)
HCT VFR BLD AUTO: 20.8 % (ref 40–54)
HCT VFR BLD AUTO: 24 % (ref 40–54)
HCT VFR BLD AUTO: 24 % (ref 40–54)
HGB BLD-MCNC: 6.9 G/DL (ref 14–18)
HGB BLD-MCNC: 7.8 G/DL (ref 14–18)
HGB BLD-MCNC: 7.8 G/DL (ref 14–18)
IMM GRANULOCYTES # BLD AUTO: 0.02 K/UL (ref 0–0.04)
IMM GRANULOCYTES # BLD AUTO: 0.02 K/UL (ref 0–0.04)
IMM GRANULOCYTES # BLD AUTO: 0.03 K/UL (ref 0–0.04)
IMM GRANULOCYTES NFR BLD AUTO: 0.4 % (ref 0–0.5)
IMM GRANULOCYTES NFR BLD AUTO: 0.4 % (ref 0–0.5)
IMM GRANULOCYTES NFR BLD AUTO: 0.5 % (ref 0–0.5)
LYMPHOCYTES # BLD AUTO: 0.8 K/UL (ref 1–4.8)
LYMPHOCYTES NFR BLD: 12.8 % (ref 18–48)
LYMPHOCYTES NFR BLD: 15.3 % (ref 18–48)
LYMPHOCYTES NFR BLD: 15.3 % (ref 18–48)
MAGNESIUM SERPL-MCNC: 1.8 MG/DL (ref 1.6–2.6)
MCH RBC QN AUTO: 32.5 PG (ref 27–31)
MCH RBC QN AUTO: 32.9 PG (ref 27–31)
MCH RBC QN AUTO: 32.9 PG (ref 27–31)
MCHC RBC AUTO-ENTMCNC: 32.5 G/DL (ref 32–36)
MCHC RBC AUTO-ENTMCNC: 32.5 G/DL (ref 32–36)
MCHC RBC AUTO-ENTMCNC: 33.2 G/DL (ref 32–36)
MCV RBC AUTO: 101 FL (ref 82–98)
MCV RBC AUTO: 101 FL (ref 82–98)
MCV RBC AUTO: 98 FL (ref 82–98)
MONOCYTES # BLD AUTO: 0.5 K/UL (ref 0.3–1)
MONOCYTES # BLD AUTO: 0.5 K/UL (ref 0.3–1)
MONOCYTES # BLD AUTO: 0.6 K/UL (ref 0.3–1)
MONOCYTES NFR BLD: 8.6 % (ref 4–15)
NEUTROPHILS # BLD AUTO: 4 K/UL (ref 1.8–7.7)
NEUTROPHILS # BLD AUTO: 4 K/UL (ref 1.8–7.7)
NEUTROPHILS # BLD AUTO: 4.9 K/UL (ref 1.8–7.7)
NEUTROPHILS NFR BLD: 74.8 % (ref 38–73)
NEUTROPHILS NFR BLD: 74.8 % (ref 38–73)
NEUTROPHILS NFR BLD: 76.9 % (ref 38–73)
NRBC BLD-RTO: 0 /100 WBC
PHOSPHATE SERPL-MCNC: 2.7 MG/DL (ref 2.7–4.5)
PLATELET # BLD AUTO: 391 K/UL (ref 150–350)
PLATELET # BLD AUTO: 423 K/UL (ref 150–350)
PLATELET # BLD AUTO: 423 K/UL (ref 150–350)
PLATELET BLD QL SMEAR: ABNORMAL
PLATELET BLD QL SMEAR: ABNORMAL
PMV BLD AUTO: 8.7 FL (ref 9.2–12.9)
PMV BLD AUTO: 9.1 FL (ref 9.2–12.9)
PMV BLD AUTO: 9.1 FL (ref 9.2–12.9)
POIKILOCYTOSIS BLD QL SMEAR: SLIGHT
POIKILOCYTOSIS BLD QL SMEAR: SLIGHT
POTASSIUM SERPL-SCNC: 4.1 MMOL/L (ref 3.5–5.1)
PROT SERPL-MCNC: 6.3 G/DL (ref 6–8.4)
RBC # BLD AUTO: 2.12 M/UL (ref 4.6–6.2)
RBC # BLD AUTO: 2.37 M/UL (ref 4.6–6.2)
RBC # BLD AUTO: 2.37 M/UL (ref 4.6–6.2)
SODIUM SERPL-SCNC: 138 MMOL/L (ref 136–145)
WBC # BLD AUTO: 5.36 K/UL (ref 3.9–12.7)
WBC # BLD AUTO: 5.36 K/UL (ref 3.9–12.7)
WBC # BLD AUTO: 6.4 K/UL (ref 3.9–12.7)

## 2020-10-15 PROCEDURE — 25500020 PHARM REV CODE 255: Performed by: SURGERY

## 2020-10-15 PROCEDURE — 25000003 PHARM REV CODE 250: Performed by: STUDENT IN AN ORGANIZED HEALTH CARE EDUCATION/TRAINING PROGRAM

## 2020-10-15 PROCEDURE — 83735 ASSAY OF MAGNESIUM: CPT

## 2020-10-15 PROCEDURE — 25000003 PHARM REV CODE 250: Performed by: SURGERY

## 2020-10-15 PROCEDURE — 20000000 HC ICU ROOM

## 2020-10-15 PROCEDURE — 80053 COMPREHEN METABOLIC PANEL: CPT

## 2020-10-15 PROCEDURE — 99233 PR SUBSEQUENT HOSPITAL CARE,LEVL III: ICD-10-PCS | Mod: ,,, | Performed by: SURGERY

## 2020-10-15 PROCEDURE — 99233 SBSQ HOSP IP/OBS HIGH 50: CPT | Mod: ,,, | Performed by: SURGERY

## 2020-10-15 PROCEDURE — 94761 N-INVAS EAR/PLS OXIMETRY MLT: CPT

## 2020-10-15 PROCEDURE — 84100 ASSAY OF PHOSPHORUS: CPT

## 2020-10-15 PROCEDURE — 85025 COMPLETE CBC W/AUTO DIFF WBC: CPT

## 2020-10-15 PROCEDURE — 63600175 PHARM REV CODE 636 W HCPCS: Performed by: STUDENT IN AN ORGANIZED HEALTH CARE EDUCATION/TRAINING PROGRAM

## 2020-10-15 RX ORDER — POLYETHYLENE GLYCOL 3350 17 G/17G
17 POWDER, FOR SOLUTION ORAL DAILY
Status: DISCONTINUED | OUTPATIENT
Start: 2020-10-15 | End: 2020-10-15

## 2020-10-15 RX ORDER — POLYETHYLENE GLYCOL 3350 17 G/17G
17 POWDER, FOR SOLUTION ORAL 2 TIMES DAILY
Status: DISCONTINUED | OUTPATIENT
Start: 2020-10-15 | End: 2020-10-16 | Stop reason: HOSPADM

## 2020-10-15 RX ORDER — LABETALOL HCL 20 MG/4 ML
10 SYRINGE (ML) INTRAVENOUS EVERY 4 HOURS PRN
Status: DISCONTINUED | OUTPATIENT
Start: 2020-10-15 | End: 2020-10-16 | Stop reason: HOSPADM

## 2020-10-15 RX ORDER — HYDROCHLOROTHIAZIDE 12.5 MG/1
50 TABLET ORAL DAILY
Status: DISCONTINUED | OUTPATIENT
Start: 2020-10-16 | End: 2020-10-16 | Stop reason: HOSPADM

## 2020-10-15 RX ORDER — DOCUSATE SODIUM 100 MG/1
200 CAPSULE, LIQUID FILLED ORAL 2 TIMES DAILY
Status: DISCONTINUED | OUTPATIENT
Start: 2020-10-15 | End: 2020-10-16 | Stop reason: HOSPADM

## 2020-10-15 RX ORDER — BISACODYL 10 MG
10 SUPPOSITORY, RECTAL RECTAL ONCE
Status: COMPLETED | OUTPATIENT
Start: 2020-10-15 | End: 2020-10-15

## 2020-10-15 RX ADMIN — POLYETHYLENE GLYCOL 3350 17 G: 17 POWDER, FOR SOLUTION ORAL at 08:10

## 2020-10-15 RX ADMIN — METOPROLOL SUCCINATE 100 MG: 100 TABLET, EXTENDED RELEASE ORAL at 08:10

## 2020-10-15 RX ADMIN — LAMOTRIGINE 25 MG: 25 TABLET ORAL at 09:10

## 2020-10-15 RX ADMIN — ESMOLOL HYDROCHLORIDE 300 MCG/KG/MIN: 20 INJECTION INTRAVENOUS at 08:10

## 2020-10-15 RX ADMIN — ESMOLOL HYDROCHLORIDE 225 MCG/KG/MIN: 20 INJECTION INTRAVENOUS at 12:10

## 2020-10-15 RX ADMIN — ESMOLOL HYDROCHLORIDE 225 MCG/KG/MIN: 20 INJECTION INTRAVENOUS at 05:10

## 2020-10-15 RX ADMIN — BISACODYL 10 MG: 10 SUPPOSITORY RECTAL at 06:10

## 2020-10-15 RX ADMIN — HEPARIN SODIUM 5000 UNITS: 5000 INJECTION INTRAVENOUS; SUBCUTANEOUS at 02:10

## 2020-10-15 RX ADMIN — MEMANTINE HYDROCHLORIDE 10 MG: 10 TABLET ORAL at 09:10

## 2020-10-15 RX ADMIN — HYDROCHLOROTHIAZIDE 12.5 MG: 12.5 TABLET ORAL at 08:10

## 2020-10-15 RX ADMIN — OXYCODONE 5 MG: 5 TABLET ORAL at 06:10

## 2020-10-15 RX ADMIN — CLONIDINE HYDROCHLORIDE 0.3 MG: 0.3 TABLET ORAL at 08:10

## 2020-10-15 RX ADMIN — IOHEXOL 100 ML: 350 INJECTION, SOLUTION INTRAVENOUS at 09:10

## 2020-10-15 RX ADMIN — CLONIDINE HYDROCHLORIDE 0.3 MG: 0.3 TABLET ORAL at 09:10

## 2020-10-15 RX ADMIN — TAMSULOSIN HYDROCHLORIDE 0.4 MG: 0.4 CAPSULE ORAL at 08:10

## 2020-10-15 RX ADMIN — AMLODIPINE BESYLATE 10 MG: 10 TABLET ORAL at 08:10

## 2020-10-15 RX ADMIN — ATORVASTATIN CALCIUM 10 MG: 10 TABLET, FILM COATED ORAL at 08:10

## 2020-10-15 RX ADMIN — DOCUSATE SODIUM 200 MG: 100 CAPSULE, LIQUID FILLED ORAL at 09:10

## 2020-10-15 RX ADMIN — POLYETHYLENE GLYCOL 3350 17 G: 17 POWDER, FOR SOLUTION ORAL at 09:10

## 2020-10-15 RX ADMIN — OXYCODONE 5 MG: 5 TABLET ORAL at 09:10

## 2020-10-15 RX ADMIN — HEPARIN SODIUM 5000 UNITS: 5000 INJECTION INTRAVENOUS; SUBCUTANEOUS at 09:10

## 2020-10-15 RX ADMIN — CLONIDINE HYDROCHLORIDE 0.3 MG: 0.3 TABLET ORAL at 02:10

## 2020-10-15 RX ADMIN — ESCITALOPRAM OXALATE 10 MG: 10 TABLET ORAL at 09:10

## 2020-10-15 RX ADMIN — MORPHINE SULFATE 4 MG: 4 INJECTION INTRAVENOUS at 08:10

## 2020-10-15 RX ADMIN — ASPIRIN 81 MG: 81 TABLET, COATED ORAL at 08:10

## 2020-10-15 RX ADMIN — DONEPEZIL HYDROCHLORIDE 10 MG: 5 TABLET, FILM COATED ORAL at 09:10

## 2020-10-15 RX ADMIN — MEMANTINE HYDROCHLORIDE 10 MG: 10 TABLET ORAL at 08:10

## 2020-10-15 NOTE — NURSING TRANSFER
Nursing Transfer Note      10/15/2020     Transfer To: CT    Transfer via wheelchair    Transfer with cardiac monitoring    Transported by RN    Medicines sent: Esmolol    Chart send with patient: Yes    Notified: spouse    Patient reassessed at: 0915 10/15/20 (date, time)    Upon arrival to floor: cardiac monitor applied, patient oriented to room and bed in lowest position

## 2020-10-15 NOTE — PLAN OF CARE
CM obtained discharge planning assessment at bedside from patient.  No discharge needs are noted at this time.      Ghazal Paz MD  [address] / [place] [address]      [facility] [address] [place] [address]  Phone: [phone] Fax: [phone]    [facility] [address] [place] [address] ROAD  [address] [place] [address]  Phone: [phone] Fax: [phone]    Payor: MEDICARE / Plan: MEDICARE PART A & B / Product Type: Government /     Extended Emergency Contact Information  Primary Emergency Contact: Alba Pope  Address: [address] [place] [address] [place]  Mobile Phone: [phone]  Relation: Spouse  Secondary Emergency Contact: Gavi Nunes   [place]  Mobile Phone: [phone]  Relation: Daughter    Future Appointments   Date Time Provider Department Center   1/26/2021  2:40 PM Devan Cason MD [facility] NEURO  Medical C            10/15/20 1333   Discharge Assessment   Assessment Type Discharge Planning Assessment   Confirmed/corrected address and phone number on facesheet? Yes   Assessment information obtained from? Medical Record;Patient   Communicated expected length of stay with patient/caregiver yes   Prior to hospitilization cognitive status: Alert/Oriented   Prior to hospitalization functional status: Assistive Equipment   Current cognitive status: Alert/Oriented   Current Functional Status:   (unable to assess)   Facility Arrived From: [facility]   Lives With spouse   Able to Return to Prior Arrangements other (see comments)  (TBD)   Is patient able to care for self after discharge? Unable to determine at this time (comments)   Who are your caregiver(s) and their phone number(s)? Alba Pope (spouse)- [phone]   Readmission Within the Last 30 Days other (see comments)  (transfer from [facility])   Patient currently being followed by outpatient case  management? No   Patient currently receives any other outside agency services? No   Equipment Currently Used at Home walker, rolling;cane, straight;other (see comments)  (lifevest)   Do you have any problems affording any of your prescribed medications? No   Is the patient taking medications as prescribed? yes   Does the patient have transportation home? Yes   Transportation Anticipated family or friend will provide   Dialysis Name and Scheduled days N/A   Does the patient receive services at the [facility]madin Clinic? No   Discharge Plan A Home Health   Discharge Plan B Home with family   DME Needed Upon Discharge  other (see comments)  (TBD)   Patient/Family in Agreement with Plan yes   Does the patient have transportation to healthcare appointments? Yes       Parul Augustine MPH, RN, CM  Ext. [phone]

## 2020-10-15 NOTE — PLAN OF CARE
SW is following this Pt for DC planning needs. There are no identified needs at this time. Discharge Disposition: TBD - pending patient progress    SW will continue to coordinate with patient, family, team and insurance to complete patient's discharge plan.    Carline Ibrahim LMSW   - Case Management

## 2020-10-15 NOTE — PROGRESS NOTES
Pharmacokinetic Initial Assessment: IV Vancomycin    Assessment/Plan:    Initiate intravenous vancomycin with loading dose of 1500 mg once followed by a maintenance dose of vancomycin 1000mg IV every 24 hours  Desired empiric serum trough concentration is 10 to 20 mcg/mL  Draw vancomycin trough level 60 min prior to third dose on 10/17/20 at approximately 0000  Pharmacy will continue to follow and monitor vancomycin.      Please contact pharmacy at extension [phone] with any questions regarding this assessment.     Thank you for the consult,   Dianne Dinh       Patient brief summary:  Cooper Pope Jr. is a 64 y.o. male initiated on antimicrobial therapy with IV Vancomycin for treatment of suspected bacteremia    Drug Allergies:   Review of patient's allergies indicates:   Allergen Reactions    Tylenol [acetaminophen] Hives       Actual Body Weight:   56.8 kg    Renal Function:   Estimated Creatinine Clearance: 58.4 mL/min (based on SCr of 1 mg/dL).,     CBC (last 72 hours):  Recent Labs   Lab Result Units 10/12/20  1610 10/13/20  0534 10/14/20  0405   WBC K/uL 6.63 5.91 6.99   Hemoglobin g/dL 9.7* 8.4* 9.0*   Hematocrit % 30.0* 25.6* 28.6*   Platelets K/uL 421* 347 454*   Gran% % 68.2 71.6 76.0*   Lymph% % 14.3* 12.7* 12.3*   Mono% % 15.2* 13.9 10.3   Eosinophil% % 1.5 1.2 1.0   Basophil% % 0.5 0.3 0.1   Differential Method  Automated Automated Automated       Metabolic Panel (last 72 hours):  Recent Labs   Lab Result Units 10/12/20  1710 10/13/20  0534 10/14/20  0405   Sodium mmol/L 142 139 139   Potassium mmol/L 4.5 4.3 4.1   Chloride mmol/L 109 108 109   CO2 mmol/L 24 24 19*   Glucose mg/dL 107 111* 110   BUN, Bld mg/dL 24* 19 18   Creatinine mg/dL 1.2 1.1 1.0   Albumin g/dL 3.2*  --   --    Total Bilirubin mg/dL 0.9  --   --    Alkaline Phosphatase U/L 111  --   --    AST U/L 24  --   --    ALT U/L 15  --   --        Drug levels (last 3 results):  No results for input(s): VANCOMYCINRA, VANCOMYCINPE,  VANCOMYCINTR in the last 72 hours.    Microbiologic Results:  Microbiology Results (last 7 days)     Procedure Component Value Units Date/Time    Blood culture [[id number]]     Order Status: No result Specimen: Blood     Blood culture [[id number]]     Order Status: No result Specimen: Blood
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
## 2020-10-15 NOTE — PROGRESS NOTES
VASCULAR SURGERY SERVICE  Daily Progress Note    Assessment/Plan  64 y.o. male with intramural hematoma of the descending thoracic aorta, subacute or chronic aortic dissection and abdominal aorta  - continue impulse control (heart rate less than 70, systolic blood pressure less than 120).  Transition to oral medicines as able.  - repeat CTA chest abdomen and pelvis today    Subjective  No complaints this morning.  Continues to require esmolol.    Objective  Vital Signs:  /74 (BP Location: Left arm, Patient Position: Lying)   Pulse 77   Temp 98.6 °F (37 °C) (Oral)   Resp (!) 25   Ht 6' (1.829 m)   Wt 56.8 kg (125 lb 3.5 oz)   SpO2 98%   BMI 16.98 kg/m²     Intake / Output:    Intake/Output Summary (Last 24 hours) at 10/15/2020 0821  Last data filed at 10/15/2020 0600  Gross per 24 hour   Intake 3505 ml   Output 1050 ml   Net 2455 ml     Physical Exam:  General:  No distress  Cardiac:  Regular rate and rhythm  Pulmonary:  Unlabored   Abdomen:  Soft    Diagnostic Data:  Recent Labs     10/14/20  0405 10/15/20  0413 10/15/20  0523   WBC 6.99 6.40 5.36  5.36   HGB 9.0* 6.9* 7.8*  7.8*   HCT 28.6* 20.8* 24.0*  24.0*   * 391* 423*  423*        Recent Labs     10/13/20  0534 10/14/20  0405 10/15/20  0413    139 138   K 4.3 4.1 4.1    109 109   CO2 24 19* 22*   BUN 19 18 20   CREATININE 1.1 1.0 1.1   CALCIUM 8.8 9.4 8.4*   ANIONGAP 7* 11 7*   ESTGFRAFRICA >60 >60.0 >60.0   EGFRNONAA >60 >60.0 >60.0       Recent Labs     10/12/20  1710 10/15/20  0413   ALT 15 9*   AST 24 15   ALKPHOS 111 76   BILITOT 0.9 0.8       Yoel Lawson MD PGY-7  Vascular and Endovascular Surgery Fellow  10/15/2020

## 2020-10-15 NOTE — HOSPITAL COURSE
10/15 overnight patient Hbg dropped from 9.0->6.7, vascular aware and will repeat CT chest/abd/pelvis this morning. Patient otherwise stable without complaints of CP or BP. No SOB.   10/16: No acute events. Pt resting comfortable, off anti-hypertensive medications >24 hours, stable for step down.

## 2020-10-15 NOTE — PROGRESS NOTES
VASCULAR SURGERY  Afternoon Progress Note    Assessment/Plan:  64 y.o. male with IMH  - Esmolol, cardene, oral HTN  -Elevated CRP, ESR, but no white count or fevers, will repeat scan tomorrow    Subjective:  No change since seen in AM    Objective:  /68   Pulse 67   Temp 98.8 °F (37.1 °C)   Resp 18   Ht 6' (1.829 m)   Wt 56.8 kg (125 lb 3.5 oz)   SpO2 98%   BMI 16.98 kg/m²       Jose L Montiel MD  General Surgery, PGY-3  [phone]

## 2020-10-15 NOTE — PLAN OF CARE
VSS overnight, HR controlled w/ esmolol. Pt free from falls or injured throughout shift. Pt repositioned independently, no new skin breakdown noted. Bed plugged in. Pt NPO, and plans for repeat scan today

## 2020-10-15 NOTE — PROGRESS NOTES
Vancomycin consult discontinued by provider.  Pharmacy will sign off, please re-consult as needed.

## 2020-10-15 NOTE — NURSING TRANSFER
Nursing Transfer Note      10/15/2020     Transfer From: CT    Transfer via wheelchair    Transfer with cardiac monitoring    Transported by RN    Medicines sent: esmolol    Chart send with patient: Yes    Notified: spouse    Patient reassessed at: 0945 10/16/20     Upon arrival to floor: cardiac monitor applied, patient oriented to room and call bell in reach
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
## 2020-10-15 NOTE — ASSESSMENT & PLAN NOTE
Cooper Pope Jr. is a 64 y.o. male w/ PMHX of RCC, CVA, Jehovan's witness, presents with/ IMH beginning at left subclavian takeoff and extending into abdomen.  admitted to ICU, strict BP control with esmolol and cardene gtt.      Neuro/Psych:   -h/o vascular dementia , continue donepezil, escitalopram, lamotrigine, memantine  - Pain:oxycodone 5mg q6 PRN , morphine 4mg Q6h  -              Cards:   -- HDS, continue ASA   -HR <70, SBP<120 for impulse control  - refuses arterial line, but BP have been good on non-invasive cuff  - off cardene drip since yesterday 10/14 at 7pm, remains on esmolol drip at 225  - continue clonidine 0.3 TID, metoprolol 100mg, amlopidine 10mg, HCTZ 12.5   - continue to titrate oral BP meds to meet goals   -vascular will rescan this morning due to acute drop in Hgb from 9.0->6.7, patient asymptomatic       Pulm:   - on RA  -- Goal O2 sat > 90%        Renal:  -- BUN/Cr: 18/1.0 on 10/14, none this am to reduce blood draws      FEN / GI:   -- Replace lytes as needed;   -- Nutrition: cardiac diet, NPO while awaiting CT scan         ID:   -- Tm: afebrile; WBC 6.9      Heme/Onc:   -- H/H stable   -- minimize blood draws due to his [employer] beliefs       Endo:   -- Gluc goal 140-180  -- SSI      PPx:   Feeding: cardiac   Analgesia/Sedation:   Thromboembolic prevention: heparin  HOB >30:   Stress Ulcer ppx: protonix   Glucose control: Critical care goal 140-180 g/dl, ISS     -SICU/Full code

## 2020-10-16 VITALS
HEIGHT: 72 IN | BODY MASS INDEX: 18.22 KG/M2 | RESPIRATION RATE: 25 BRPM | WEIGHT: 134.5 LBS | DIASTOLIC BLOOD PRESSURE: 72 MMHG | HEART RATE: 67 BPM | TEMPERATURE: 99 F | SYSTOLIC BLOOD PRESSURE: 114 MMHG | OXYGEN SATURATION: 98 %

## 2020-10-16 LAB
ALBUMIN SERPL BCP-MCNC: 2.4 G/DL (ref 3.5–5.2)
ALP SERPL-CCNC: 74 U/L (ref 55–135)
ALT SERPL W/O P-5'-P-CCNC: 10 U/L (ref 10–44)
ANION GAP SERPL CALC-SCNC: 9 MMOL/L (ref 8–16)
AST SERPL-CCNC: 16 U/L (ref 10–40)
BILIRUB SERPL-MCNC: 0.7 MG/DL (ref 0.1–1)
BUN SERPL-MCNC: 18 MG/DL (ref 8–23)
CALCIUM SERPL-MCNC: 8.3 MG/DL (ref 8.7–10.5)
CHLORIDE SERPL-SCNC: 106 MMOL/L (ref 95–110)
CO2 SERPL-SCNC: 22 MMOL/L (ref 23–29)
CREAT SERPL-MCNC: 1.1 MG/DL (ref 0.5–1.4)
EST. GFR  (AFRICAN AMERICAN): >60 ML/MIN/1.73 M^2
EST. GFR  (NON AFRICAN AMERICAN): >60 ML/MIN/1.73 M^2
GLUCOSE SERPL-MCNC: 103 MG/DL (ref 70–110)
MAGNESIUM SERPL-MCNC: 1.9 MG/DL (ref 1.6–2.6)
PHOSPHATE SERPL-MCNC: 2.9 MG/DL (ref 2.7–4.5)
POTASSIUM SERPL-SCNC: 3.8 MMOL/L (ref 3.5–5.1)
PROT SERPL-MCNC: 6.1 G/DL (ref 6–8.4)
SODIUM SERPL-SCNC: 137 MMOL/L (ref 136–145)

## 2020-10-16 PROCEDURE — 63600175 PHARM REV CODE 636 W HCPCS: Performed by: STUDENT IN AN ORGANIZED HEALTH CARE EDUCATION/TRAINING PROGRAM

## 2020-10-16 PROCEDURE — 83735 ASSAY OF MAGNESIUM: CPT

## 2020-10-16 PROCEDURE — 25000003 PHARM REV CODE 250: Performed by: STUDENT IN AN ORGANIZED HEALTH CARE EDUCATION/TRAINING PROGRAM

## 2020-10-16 PROCEDURE — 99233 PR SUBSEQUENT HOSPITAL CARE,LEVL III: ICD-10-PCS | Mod: ,,, | Performed by: SURGERY

## 2020-10-16 PROCEDURE — 84100 ASSAY OF PHOSPHORUS: CPT

## 2020-10-16 PROCEDURE — 80053 COMPREHEN METABOLIC PANEL: CPT

## 2020-10-16 PROCEDURE — 99233 SBSQ HOSP IP/OBS HIGH 50: CPT | Mod: ,,, | Performed by: SURGERY

## 2020-10-16 PROCEDURE — 94761 N-INVAS EAR/PLS OXIMETRY MLT: CPT

## 2020-10-16 RX ORDER — HYDROCHLOROTHIAZIDE 12.5 MG/1
25 TABLET ORAL DAILY
Qty: 30 TABLET | Refills: 11 | Status: SHIPPED | OUTPATIENT
Start: 2020-10-16 | End: 2021-10-16

## 2020-10-16 RX ORDER — METOPROLOL SUCCINATE 50 MG/1
100 TABLET, EXTENDED RELEASE ORAL DAILY
Qty: 720 TABLET | Refills: 0 | Status: SHIPPED | OUTPATIENT
Start: 2020-10-16 | End: 2021-10-16

## 2020-10-16 RX ADMIN — METOPROLOL SUCCINATE 100 MG: 100 TABLET, EXTENDED RELEASE ORAL at 09:10

## 2020-10-16 RX ADMIN — MEMANTINE HYDROCHLORIDE 10 MG: 10 TABLET ORAL at 09:10

## 2020-10-16 RX ADMIN — MORPHINE SULFATE 4 MG: 4 INJECTION INTRAVENOUS at 07:10

## 2020-10-16 RX ADMIN — AMLODIPINE BESYLATE 10 MG: 10 TABLET ORAL at 09:10

## 2020-10-16 RX ADMIN — ATORVASTATIN CALCIUM 10 MG: 10 TABLET, FILM COATED ORAL at 09:10

## 2020-10-16 RX ADMIN — HEPARIN SODIUM 5000 UNITS: 5000 INJECTION INTRAVENOUS; SUBCUTANEOUS at 06:10

## 2020-10-16 RX ADMIN — ASPIRIN 81 MG: 81 TABLET, COATED ORAL at 09:10

## 2020-10-16 RX ADMIN — CLONIDINE HYDROCHLORIDE 0.3 MG: 0.3 TABLET ORAL at 09:10

## 2020-10-16 RX ADMIN — CLONIDINE HYDROCHLORIDE 0.3 MG: 0.3 TABLET ORAL at 02:10

## 2020-10-16 RX ADMIN — OXYCODONE 5 MG: 5 TABLET ORAL at 03:10

## 2020-10-16 RX ADMIN — LAMOTRIGINE 25 MG: 25 TABLET ORAL at 09:10

## 2020-10-16 RX ADMIN — HYDROCHLOROTHIAZIDE 50 MG: 12.5 TABLET ORAL at 09:10

## 2020-10-16 RX ADMIN — MORPHINE SULFATE 4 MG: 4 INJECTION INTRAVENOUS at 02:10

## 2020-10-16 RX ADMIN — HEPARIN SODIUM 5000 UNITS: 5000 INJECTION INTRAVENOUS; SUBCUTANEOUS at 02:10

## 2020-10-16 RX ADMIN — TAMSULOSIN HYDROCHLORIDE 0.4 MG: 0.4 CAPSULE ORAL at 09:10

## 2020-10-16 RX ADMIN — OXYCODONE 5 MG: 5 TABLET ORAL at 09:10

## 2020-10-16 NOTE — ASSESSMENT & PLAN NOTE
Cooper Pope . is a 64 y.o. male w/ IMH beginning at left subclavian takeoff and extending into abdomen.  Also evidence of subacute/chronic dissection in abdominal aorta, possibly a ruptured RANDA w/ a retrograde bleed. BP controlled on oral regimen, now off drips 24 hours.    -HR <70, SBP<120 for impulse control  -OK for PO  -home BP meds restarted (previously non-compliant, per report was only on clonidine)  -Monitor for chest pain, denies any right now  -Off drips for 24 hours  - ok to step down to floor  - possible discharge later today or tomorrow

## 2020-10-16 NOTE — PLAN OF CARE
Significant events:  Patient weak while having a bowel movement. See previous note for details.   Vitals: Room air.  O2: >95%.   HR: 45-70's.    MAP: 65-90's.    SBP: 's.    Temperature max: 99.4 F.   Gtts:  No continuous infusions.   UOP:  400 per urinal.   Neuro:  AAOx4, follows commands, and moves all extremities purposefully.    Diet:  Cardiac diet.        Plan:   Step down from ICU today.     Skin:   No skin breakdown noted. Heel foams in use. Patient turns in bed independently. Bindu bed in use.
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
## 2020-10-16 NOTE — PROGRESS NOTES
Patient assisted to toilet with minimal assistance. Patient complained of weakness after passing stool. Assisted to bed with 2 person assist.   HR: 45-50, MAP: > 65. AAOx4.  Dr. Andrew notified.   Will continue to monitor.

## 2020-10-16 NOTE — PLAN OF CARE
SW is following this Pt for DC planning needs. There are no identified needs at this time. Discharge Disposition: home    SW will continue to coordinate with patient, family, team and insurance to complete patient's discharge plan.    Carline Ibrahim LMSW   - Case Management

## 2020-10-16 NOTE — PROGRESS NOTES
[facility]  Vascular Surgery  Progress Note    Patient Name: Cooper Pope Jr.  MRN: [id number]  Admission Date: 10/13/2020  Primary Care Provider: Ghazal Paz MD    Subjective:     Interval History: BP controlled on oral antihypertensives. Off drips for 24 hours. Had large BM yesterday and endorses feeling much better.     Post-Op Info:  * No surgery found *           Medications:  Continuous Infusions:    Scheduled Meds:   amLODIPine  10 mg Oral Daily    aspirin  81 mg Oral Daily    atorvastatin  10 mg Oral Daily    cloNIDine  0.3 mg Oral TID    docusate sodium  200 mg Oral BID    donepeziL  10 mg Oral QHS    escitalopram oxalate  10 mg Oral QHS    heparin (porcine)  5,000 Units Subcutaneous Q8H    hydroCHLOROthiazide  50 mg Oral Daily    lamoTRIgine  25 mg Oral BID    memantine  10 mg Oral BID    metoprolol succinate  100 mg Oral Daily    polyethylene glycol  17 g Oral BID    tamsulosin  0.4 mg Oral Daily     PRN Meds:cyproheptadine, labetalol, morphine, oxyCODONE, sodium chloride 0.9%     Objective:     Vital Signs (Most Recent):  Temp: 98.6 °F (37 °C) (10/16/20 0800)  Pulse: (!) 57 (10/16/20 0900)  Resp: 18 (10/16/20 0900)  BP: 121/73 (10/16/20 0900)  SpO2: 100 % (10/16/20 0900) Vital Signs (24h Range):  Temp:  [98.4 °F (36.9 °C)-99.4 °F (37.4 °C)] 98.6 °F (37 °C)  Pulse:  [47-76] 57  Resp:  [13-53] 18  SpO2:  [93 %-100 %] 100 %  BP: ()/(58-77) 121/73         Physical Exam  Constitutional:       Appearance: Normal appearance.   HENT:      Head: Normocephalic.      Mouth/Throat:      Mouth: Mucous membranes are moist.   Cardiovascular:      Rate and Rhythm: Normal rate and regular rhythm.      Pulses: Normal pulses.   Pulmonary:      Effort: Pulmonary effort is normal.   Musculoskeletal:         General: No swelling or deformity.   Skin:     General: Skin is warm.   Neurological:      Mental Status: He is alert.         Significant Labs:  BMP:   Recent Labs   Lab  10/16/20  0300         K 3.8      CO2 22*   BUN 18   CREATININE 1.1   CALCIUM 8.3*   MG 1.9     CBC:   Recent Labs   Lab 10/15/20  0523   WBC 5.36  5.36   RBC 2.37*  2.37*   HGB 7.8*  7.8*   HCT 24.0*  24.0*   *  423*   *  101*   MCH 32.9*  32.9*   MCHC 32.5  32.5       Significant Diagnostics:  I have reviewed all pertinent imaging results/findings within the past 24 hours.    Assessment/Plan:     * Intramural hematoma  Cooper Pope Jr. is a 64 y.o. male w/ IMH beginning at left subclavian takeoff and extending into abdomen.  Also evidence of subacute/chronic dissection in abdominal aorta, possibly a ruptured RANDA w/ a retrograde bleed. BP controlled on oral regimen, now off drips 24 hours.    -HR <70, SBP<120 for impulse control  -OK for PO  -home BP meds restarted (previously non-compliant, per report was only on clonidine)  -Monitor for chest pain, denies any right now  -Off drips for 24 hours  - ok to step down to floor  - possible discharge later today or tomorrow          Binu Rehman MD  Vascular Surgery  [facility]

## 2020-10-16 NOTE — PROGRESS NOTES
[facility]  Critical Care - Surgery  Progress Note    Patient Name: Cooper Pope Jr.  MRN: [id number]  Admission Date: 10/13/2020  Hospital Length of Stay: 3 days  Code Status: Full Code  Attending Provider: Jerrod Guthrie MD  Primary Care Provider: Ghazal Paz MD   Principal Problem: Intramural hematoma    Subjective:     Hospital/ICU Course:  10/15 overnight patient Hbg dropped from 9.0->6.7, vascular aware and will repeat CT chest/abd/pelvis this morning. Patient otherwise stable without complaints of CP or BP. No SOB.   10/16: No acute events. Pt resting comfortable, off anti-hypertensive medications >24 hours, stable for step down.    Interval History/Significant Events: Please see hospital course above.    Objective:     Vital Signs (Most Recent):  Temp: 98.6 °F (37 °C) (10/16/20 0800)  Pulse: (!) 57 (10/16/20 0900)  Resp: 18 (10/16/20 0900)  BP: 121/73 (10/16/20 0900)  SpO2: 100 % (10/16/20 0900) Vital Signs (24h Range):  Temp:  [98.4 °F (36.9 °C)-99.4 °F (37.4 °C)] 98.6 °F (37 °C)  Pulse:  [47-76] 57  Resp:  [13-53] 18  SpO2:  [93 %-100 %] 100 %  BP: ()/(58-83) 121/73     Weight: 61 kg (134 lb 7.7 oz)  Body mass index is 18.24 kg/m².      Intake/Output Summary (Last 24 hours) at 10/16/2020 0954  Last data filed at 10/16/2020 0120  Gross per 24 hour   Intake 400 ml   Output 1002 ml   Net -602 ml       Physical Exam  Vitals signs and nursing note reviewed.   Constitutional:       Appearance: He is well-developed.   HENT:      Head: Normocephalic and atraumatic.      Nose: Nose normal.   Neck:      Musculoskeletal: Normal range of motion.   Cardiovascular:      Rate and Rhythm: Normal rate and regular rhythm.      Heart sounds: Normal heart sounds.   Pulmonary:      Effort: Pulmonary effort is normal. No respiratory distress.      Breath sounds: Normal breath sounds. No wheezing or rales.   Abdominal:      General: Bowel sounds are normal.   Musculoskeletal: Normal  range of motion.   Skin:     General: Skin is dry.   Neurological:      Mental Status: He is alert and oriented to person, place, and time.         Vents:       Lines/Drains/Airways     Peripheral Intravenous Line                 Peripheral IV - Single Lumen 10/14/20 1500 20 G Distal;Posterior;Left Forearm 1 day         Peripheral IV - Single Lumen 10/15/20 2110 20 G Right Upper Arm less than 1 day                Significant Labs:    CBC/Anemia Profile:  Recent Labs   Lab 10/15/20  0413 10/15/20  0523   WBC 6.40 5.36  5.36   HGB 6.9* 7.8*  7.8*   HCT 20.8* 24.0*  24.0*   * 423*  423*   MCV 98 101*  101*   RDW 13.9 14.1  14.1        Chemistries:  Recent Labs   Lab 10/15/20  0413 10/16/20  0300    137   K 4.1 3.8    106   CO2 22* 22*   BUN 20 18   CREATININE 1.1 1.1   CALCIUM 8.4* 8.3*   ALBUMIN 2.4* 2.4*   PROT 6.3 6.1   BILITOT 0.8 0.7   ALKPHOS 76 74   ALT 9* 10   AST 15 16   MG 1.8 1.9   PHOS 2.7 2.9     Assessment/Plan:     * Intramural hematoma  Cooper Pope . is a 64 y.o. male w/ PMHX of RCC, CVA, Jehovan's witness, presents with/ IMH beginning at left subclavian takeoff and extending into abdomen.  admitted to ICU, strict BP control with esmolol and cardene gtt.      Neuro/Psych:   -h/o vascular dementia , continue donepezil, escitalopram, lamotrigine, memantine  - Pain:oxycodone 5mg q6 PRN , morphine 4mg Q6h             Cards:   - HDS, continue ASA   - HR <70, SBP<120 for impulse control  - off continuous IV antihypertensives  - continue clonidine 0.3 TID, metoprolol 100mg, amlopidine 10mg, HCTZ 12.5     Pulm:   - on RA  -- Goal O2 sat > 90%        Renal:  -- BUN/Cr: stable      FEN / GI:   -- Replace lytes as needed;   -- Nutrition: cardiac diet        ID:   -- Tm: afebrile; WBC stable      Heme/Onc:   -- H/H stable   -- minimize blood draws due to his Baptism beliefs       Endo:   -- Gluc goal 140-180  -- SSI      PPx:   Feeding: cardiac  Thromboembolic prevention:  heparin  HOB >30:   Glucose control: Critical care goal 140-180 g/dl, ISS     -SICU/Full code        Hao Lai MD  Critical Care - Surgery  [facility]

## 2020-10-16 NOTE — HOSPITAL COURSE
Pt was admitted given his CT findings.  After admission,we restarted home meds that he hadn't been taking and increased his HCTZ from 12.5 to 25 mg daily.  HE had questionable area of aortitis on his scan, but this was ruled out as it had improved on his repeat CTA, blood cx negative, never had a fever or elevated white count.  Pain was controlled with BP management and after all home meds restarted, he was off any IV blood pressure meds.  After his pain and BP controlled, was discharged home.

## 2020-10-16 NOTE — SUBJECTIVE & OBJECTIVE
Interval History/Significant Events: Please see hospital course above.    Objective:     Vital Signs (Most Recent):  Temp: 98.6 °F (37 °C) (10/16/20 0800)  Pulse: (!) 57 (10/16/20 0900)  Resp: 18 (10/16/20 0900)  BP: 121/73 (10/16/20 0900)  SpO2: 100 % (10/16/20 0900) Vital Signs (24h Range):  Temp:  [98.4 °F (36.9 °C)-99.4 °F (37.4 °C)] 98.6 °F (37 °C)  Pulse:  [47-76] 57  Resp:  [13-53] 18  SpO2:  [93 %-100 %] 100 %  BP: ()/(58-83) 121/73     Weight: 61 kg (134 lb 7.7 oz)  Body mass index is 18.24 kg/m².      Intake/Output Summary (Last 24 hours) at 10/16/2020 0954  Last data filed at 10/16/2020 0120  Gross per 24 hour   Intake 400 ml   Output 1002 ml   Net -602 ml       Physical Exam  Vitals signs and nursing note reviewed.   Constitutional:       Appearance: He is well-developed.   HENT:      Head: Normocephalic and atraumatic.      Nose: Nose normal.   Neck:      Musculoskeletal: Normal range of motion.   Cardiovascular:      Rate and Rhythm: Normal rate and regular rhythm.      Heart sounds: Normal heart sounds.   Pulmonary:      Effort: Pulmonary effort is normal. No respiratory distress.      Breath sounds: Normal breath sounds. No wheezing or rales.   Abdominal:      General: Bowel sounds are normal.   Musculoskeletal: Normal range of motion.   Skin:     General: Skin is dry.   Neurological:      Mental Status: He is alert and oriented to person, place, and time.         Vents:       Lines/Drains/Airways     Peripheral Intravenous Line                 Peripheral IV - Single Lumen 10/14/20 1500 20 G Distal;Posterior;Left Forearm 1 day         Peripheral IV - Single Lumen 10/15/20 2110 20 G Right Upper Arm less than 1 day                Significant Labs:    CBC/Anemia Profile:  Recent Labs   Lab 10/15/20  0413 10/15/20  0523   WBC 6.40 5.36  5.36   HGB 6.9* 7.8*  7.8*   HCT 20.8* 24.0*  24.0*   * 423*  423*   MCV 98 101*  101*   RDW 13.9 14.1  14.1        Chemistries:  Recent Labs   Lab  10/15/20  0413 10/16/20  0300    137   K 4.1 3.8    106   CO2 22* 22*   BUN 20 18   CREATININE 1.1 1.1   CALCIUM 8.4* 8.3*   ALBUMIN 2.4* 2.4*   PROT 6.3 6.1   BILITOT 0.8 0.7   ALKPHOS 76 74   ALT 9* 10   AST 15 16   MG 1.8 1.9   PHOS 2.7 2.9

## 2020-10-16 NOTE — SUBJECTIVE & OBJECTIVE
Medications:  Continuous Infusions:    Scheduled Meds:   amLODIPine  10 mg Oral Daily    aspirin  81 mg Oral Daily    atorvastatin  10 mg Oral Daily    cloNIDine  0.3 mg Oral TID    docusate sodium  200 mg Oral BID    donepeziL  10 mg Oral QHS    escitalopram oxalate  10 mg Oral QHS    heparin (porcine)  5,000 Units Subcutaneous Q8H    hydroCHLOROthiazide  50 mg Oral Daily    lamoTRIgine  25 mg Oral BID    memantine  10 mg Oral BID    metoprolol succinate  100 mg Oral Daily    polyethylene glycol  17 g Oral BID    tamsulosin  0.4 mg Oral Daily     PRN Meds:cyproheptadine, labetalol, morphine, oxyCODONE, sodium chloride 0.9%     Objective:     Vital Signs (Most Recent):  Temp: 98.6 °F (37 °C) (10/16/20 0800)  Pulse: (!) 57 (10/16/20 0900)  Resp: 18 (10/16/20 0900)  BP: 121/73 (10/16/20 0900)  SpO2: 100 % (10/16/20 0900) Vital Signs (24h Range):  Temp:  [98.4 °F (36.9 °C)-99.4 °F (37.4 °C)] 98.6 °F (37 °C)  Pulse:  [47-76] 57  Resp:  [13-53] 18  SpO2:  [93 %-100 %] 100 %  BP: ()/(58-77) 121/73         Physical Exam  Constitutional:       Appearance: Normal appearance.   HENT:      Head: Normocephalic.      Mouth/Throat:      Mouth: Mucous membranes are moist.   Cardiovascular:      Rate and Rhythm: Normal rate and regular rhythm.      Pulses: Normal pulses.   Pulmonary:      Effort: Pulmonary effort is normal.   Musculoskeletal:         General: No swelling or deformity.   Skin:     General: Skin is warm.   Neurological:      Mental Status: He is alert.         Significant Labs:  BMP:   Recent Labs   Lab 10/16/20  0300         K 3.8      CO2 22*   BUN 18   CREATININE 1.1   CALCIUM 8.3*   MG 1.9     CBC:   Recent Labs   Lab 10/15/20  0523   WBC 5.36  5.36   RBC 2.37*  2.37*   HGB 7.8*  7.8*   HCT 24.0*  24.0*   *  423*   *  101*   MCH 32.9*  32.9*   MCHC 32.5  32.5       Significant Diagnostics:  I have reviewed all pertinent imaging results/findings within  the past 24 hours.

## 2020-10-16 NOTE — PROGRESS NOTES
VASCULAR SURGERY  Afternoon Progress Note    Assessment/Plan:  64 y.o. male with intramural hematoma of the descending thoracic aorta, subacute or chronic aortic dissection and abdominal aorta  - continue oral antihypertensives  - will monitor until off drips for 24 hours, possibly step down tomorrow morning    Subjective:  Off drips since 9 am. BP controlled <120 systolic with oral regimen.     Objective:  /68 (BP Location: Left arm, Patient Position: Lying)   Pulse 76   Temp 98.4 °F (36.9 °C) (Oral)   Resp (!) 22   Ht 6' (1.829 m)   Wt 56.8 kg (125 lb 3.5 oz)   SpO2 98%   BMI 16.98 kg/m²   Resting comfortably in bed with wife at bedside    Binu Rehman  [facility]  10/15/2020       Alternatives Discussed Intro (Do Not Add Period): I discussed alternative treatments to Mohs surgery and specifically discussed the risks and benefits of

## 2020-10-16 NOTE — ASSESSMENT & PLAN NOTE
Cooper Pope . is a 64 y.o. male w/ PMHX of RCC, CVA, Jehovan's witness, presents with/ IMH beginning at left subclavian takeoff and extending into abdomen.  admitted to ICU, strict BP control with esmolol and cardene gtt.      Neuro/Psych:   -h/o vascular dementia , continue donepezil, escitalopram, lamotrigine, memantine  - Pain:oxycodone 5mg q6 PRN , morphine 4mg Q6h             Cards:   - HDS, continue ASA   - HR <70, SBP<120 for impulse control  - off continuous IV antihypertensives  - continue clonidine 0.3 TID, metoprolol 100mg, amlopidine 10mg, HCTZ 12.5     Pulm:   - on RA  -- Goal O2 sat > 90%        Renal:  -- BUN/Cr: stable      FEN / GI:   -- Replace lytes as needed;   -- Nutrition: cardiac diet        ID:   -- Tm: afebrile; WBC stable      Heme/Onc:   -- H/H stable   -- minimize blood draws due to his Yazdanism beliefs       Endo:   -- Gluc goal 140-180  -- SSI      PPx:   Feeding: cardiac  Thromboembolic prevention: heparin  HOB >30:   Glucose control: Critical care goal 140-180 g/dl, ISS     -SICU/Full code

## 2020-10-17 NOTE — NURSING
Upon start of shift, offgoing RN, marielena brennan, had already given pt D/C paperwork, reviewed with patient, removed all lines, disconnected pt from monitor. Was all set to go for D.C VSS prior to removal of monitor. Pt resting comfortably in bed waiting for his ride, wife, to come and pick him up. She was coming from [place] with expected ETA of 1930. Wife ended up arriving at 2000. PCT rolled pt down to ER entrance to meet wife. Pt belongings with pt, including cell phone and clothing. D.C paperwork with him as well. NAD. RR even unlabored.

## 2020-10-18 ENCOUNTER — HOSPITAL ENCOUNTER (EMERGENCY)
Facility: HOSPITAL | Age: 65
Discharge: LEFT BEFORE DISCHARGE | End: 2020-10-18
Attending: STUDENT IN AN ORGANIZED HEALTH CARE EDUCATION/TRAINING PROGRAM
Payer: MEDICARE

## 2020-10-18 VITALS
WEIGHT: 134.63 LBS | SYSTOLIC BLOOD PRESSURE: 128 MMHG | HEIGHT: 72 IN | RESPIRATION RATE: 20 BRPM | OXYGEN SATURATION: 100 % | HEART RATE: 60 BPM | TEMPERATURE: 99 F | BODY MASS INDEX: 18.24 KG/M2 | DIASTOLIC BLOOD PRESSURE: 75 MMHG

## 2020-10-18 DIAGNOSIS — R10.11 RIGHT UPPER QUADRANT ABDOMINAL PAIN: Primary | ICD-10-CM

## 2020-10-18 DIAGNOSIS — I71.00 AORTIC ANEURYSM AND DISSECTION: ICD-10-CM

## 2020-10-18 LAB
ALBUMIN SERPL BCP-MCNC: 2.8 G/DL (ref 3.5–5.2)
ALP SERPL-CCNC: 107 U/L (ref 55–135)
ALT SERPL W/O P-5'-P-CCNC: 18 U/L (ref 10–44)
ANION GAP SERPL CALC-SCNC: 10 MMOL/L (ref 8–16)
ANISOCYTOSIS BLD QL SMEAR: SLIGHT
AST SERPL-CCNC: 36 U/L (ref 10–40)
BASOPHILS # BLD AUTO: 0.02 K/UL (ref 0–0.2)
BASOPHILS NFR BLD: 0.4 % (ref 0–1.9)
BILIRUB SERPL-MCNC: 1.5 MG/DL (ref 0.1–1)
BUN SERPL-MCNC: 12 MG/DL (ref 8–23)
CALCIUM SERPL-MCNC: 8.6 MG/DL (ref 8.7–10.5)
CHLORIDE SERPL-SCNC: 104 MMOL/L (ref 95–110)
CO2 SERPL-SCNC: 21 MMOL/L (ref 23–29)
CREAT SERPL-MCNC: 1.1 MG/DL (ref 0.5–1.4)
DIFFERENTIAL METHOD: ABNORMAL
EOSINOPHIL # BLD AUTO: 0.1 K/UL (ref 0–0.5)
EOSINOPHIL NFR BLD: 1.3 % (ref 0–8)
ERYTHROCYTE [DISTWIDTH] IN BLOOD BY AUTOMATED COUNT: 13.8 % (ref 11.5–14.5)
EST. GFR  (AFRICAN AMERICAN): >60 ML/MIN/1.73 M^2
EST. GFR  (NON AFRICAN AMERICAN): >60 ML/MIN/1.73 M^2
GLUCOSE SERPL-MCNC: 104 MG/DL (ref 70–110)
HCT VFR BLD AUTO: 24.9 % (ref 40–54)
HGB BLD-MCNC: 8.2 G/DL (ref 14–18)
IMM GRANULOCYTES # BLD AUTO: 0.03 K/UL (ref 0–0.04)
IMM GRANULOCYTES NFR BLD AUTO: 0.5 % (ref 0–0.5)
LYMPHOCYTES # BLD AUTO: 1.3 K/UL (ref 1–4.8)
LYMPHOCYTES NFR BLD: 22.5 % (ref 18–48)
MCH RBC QN AUTO: 32.3 PG (ref 27–31)
MCHC RBC AUTO-ENTMCNC: 32.9 G/DL (ref 32–36)
MCV RBC AUTO: 98 FL (ref 82–98)
MONOCYTES # BLD AUTO: 0.5 K/UL (ref 0.3–1)
MONOCYTES NFR BLD: 8.4 % (ref 4–15)
NEUTROPHILS # BLD AUTO: 3.8 K/UL (ref 1.8–7.7)
NEUTROPHILS NFR BLD: 66.9 % (ref 38–73)
NRBC BLD-RTO: 0 /100 WBC
PLATELET # BLD AUTO: 490 K/UL (ref 150–350)
PLATELET BLD QL SMEAR: ABNORMAL
PMV BLD AUTO: 8.6 FL (ref 9.2–12.9)
POTASSIUM SERPL-SCNC: 4.5 MMOL/L (ref 3.5–5.1)
PROT SERPL-MCNC: 7.1 G/DL (ref 6–8.4)
RBC # BLD AUTO: 2.54 M/UL (ref 4.6–6.2)
SODIUM SERPL-SCNC: 135 MMOL/L (ref 136–145)
TROPONIN I SERPL DL<=0.01 NG/ML-MCNC: <0.006 NG/ML (ref 0–0.03)
WBC # BLD AUTO: 5.6 K/UL (ref 3.9–12.7)

## 2020-10-18 PROCEDURE — 93010 EKG 12-LEAD: ICD-10-PCS | Mod: ,,, | Performed by: INTERNAL MEDICINE

## 2020-10-18 PROCEDURE — 84484 ASSAY OF TROPONIN QUANT: CPT

## 2020-10-18 PROCEDURE — 80053 COMPREHEN METABOLIC PANEL: CPT

## 2020-10-18 PROCEDURE — 99284 EMERGENCY DEPT VISIT MOD MDM: CPT | Mod: 25

## 2020-10-18 PROCEDURE — 36415 COLL VENOUS BLD VENIPUNCTURE: CPT

## 2020-10-18 PROCEDURE — 93010 ELECTROCARDIOGRAM REPORT: CPT | Mod: ,,, | Performed by: INTERNAL MEDICINE

## 2020-10-18 PROCEDURE — 85025 COMPLETE CBC W/AUTO DIFF WBC: CPT
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
## 2020-10-18 NOTE — ED NOTES
Pt requesting to leave MD DAYRON discussing risks of leaving and benefits of staying, pt verbalizes understanding, Pt and MD sign AMA form, RN witness.

## 2020-10-19 ENCOUNTER — PATIENT OUTREACH (OUTPATIENT)
Dept: ADMINISTRATIVE | Facility: CLINIC | Age: 65
End: 2020-10-19

## 2020-10-19 LAB
BACTERIA BLD CULT: NORMAL
BACTERIA BLD CULT: NORMAL

## 2020-10-20 NOTE — PATIENT INSTRUCTIONS
Hematoma  A hematoma is a collection of blood trapped outside of a blood vessel. It is what we think of as a bruise or a contusion. It is usually seen under the skin as a black and blue spot on your arm or leg, or a bump on your head after an injury. It can be almost anywhere on or in your body. It can also occur in an internal organ where it can be more serious.  A hematoma is caused by an injury with damage to small blood vessels. This causes blood to leak into the tissues. Blood forms a pocket under the skin that swells and looks like a purplish patch.  Gradually the blood in the hematoma is absorbed back into the body. The swelling and pain of the hematoma will go away. This takes from 1 to 4 weeks, depending on the size of the hematoma. The skin over the hematoma may turn bluish then brown and yellow as the blood is dissolved and absorbed. Usually, this only takes a couple of weeks but can last months.  Home care  · Limit motion of the joints near the hematoma. If the hematoma is large and painful, avoid sports and other vigorous physical activity until the swelling and pain goes away.  · Apply an ice pack (ice cubes in a plastic bag, wrapped in a thin towel or a frozen bag of peas) over the injured area for 20 minutes every 1 to 2 hours the first day. Continue with ice packs 3 to 4 times a day for the next 2 days. Continue the use of ice packs for relief of pain and swelling as needed.  · If you need anything for pain, you can take acetaminophen, unless you were given a different pain medicine to use. Talk with your doctor before using this medicine if you have chronic liver or kidney disease. Also talk with your doctor if you have had a stomach ulcer or digestive tract bleeding, or are taking blood-thinner medicines.  Follow-up care  Follow up with your healthcare provider, or as advised. If X-rays or a CT scan were done, you will be notified if there is a change in the reading, especially if it affects  treatment.  When to seek medical advice  Call your healthcare provider right away f any of the following occur:  · Redness around the hematoma  · Increase in pain or warmth in the hematoma  · Increase in size of the hematoma  · Fever of 100.4ºF (38ºC) or higher, or as directed by your healthcare provider  · If the hematoma is on the arm or leg, watch for:  ¨ Increased swelling or pain in the extremity  ¨ Numbness or tingling or blue color of the hand or foot  Date Last Reviewed: 11/5/2015 © 2000-2017 [employer]. [address] [place] [id number]. All rights reserved. This information is not intended as a substitute for professional medical care. Always follow your healthcare professional's instructions.

## 2020-10-22 ENCOUNTER — OFFICE VISIT (OUTPATIENT)
Dept: FAMILY MEDICINE | Facility: CLINIC | Age: 65
End: 2020-10-22
Payer: MEDICARE

## 2020-10-22 VITALS
HEIGHT: 72 IN | OXYGEN SATURATION: 98 % | HEART RATE: 102 BPM | BODY MASS INDEX: 18.18 KG/M2 | WEIGHT: 134.25 LBS | DIASTOLIC BLOOD PRESSURE: 76 MMHG | TEMPERATURE: 97 F | SYSTOLIC BLOOD PRESSURE: 110 MMHG

## 2020-10-22 DIAGNOSIS — M48.061 SPINAL STENOSIS OF LUMBAR REGION, UNSPECIFIED WHETHER NEUROGENIC CLAUDICATION PRESENT: ICD-10-CM

## 2020-10-22 DIAGNOSIS — E53.8 B12 DEFICIENCY: ICD-10-CM

## 2020-10-22 DIAGNOSIS — E53.8 VITAMIN B12 DEFICIENCY: ICD-10-CM

## 2020-10-22 DIAGNOSIS — D63.8 ANEMIA OF CHRONIC DISEASE: ICD-10-CM

## 2020-10-22 DIAGNOSIS — I71.02 DISSECTION OF ABDOMINAL AORTA: ICD-10-CM

## 2020-10-22 DIAGNOSIS — G89.4 CHRONIC PAIN SYNDROME: ICD-10-CM

## 2020-10-22 DIAGNOSIS — I10 ESSENTIAL HYPERTENSION: ICD-10-CM

## 2020-10-22 DIAGNOSIS — Z09 HOSPITAL DISCHARGE FOLLOW-UP: Primary | ICD-10-CM

## 2020-10-22 DIAGNOSIS — Z79.899 LONG TERM CURRENT USE OF THERAPEUTIC DRUG: ICD-10-CM

## 2020-10-22 PROCEDURE — 99214 PR OFFICE/OUTPT VISIT, EST, LEVL IV, 30-39 MIN: ICD-10-PCS | Mod: S$PBB,,, | Performed by: FAMILY MEDICINE

## 2020-10-22 PROCEDURE — 99999 PR PBB SHADOW E&M-EST. PATIENT-LVL IV: ICD-10-PCS | Mod: PBBFAC,,, | Performed by: FAMILY MEDICINE

## 2020-10-22 PROCEDURE — 99214 OFFICE O/P EST MOD 30 MIN: CPT | Mod: S$PBB,,, | Performed by: FAMILY MEDICINE

## 2020-10-22 PROCEDURE — 99214 OFFICE O/P EST MOD 30 MIN: CPT | Mod: PBBFAC,PO | Performed by: FAMILY MEDICINE

## 2020-10-22 PROCEDURE — 99999 PR PBB SHADOW E&M-EST. PATIENT-LVL IV: CPT | Mod: PBBFAC,,, | Performed by: FAMILY MEDICINE

## 2020-10-22 RX ORDER — CYANOCOBALAMIN 1000 UG/ML
1000 INJECTION, SOLUTION INTRAMUSCULAR; SUBCUTANEOUS
Qty: 10 ML | Refills: 5 | Status: SHIPPED | OUTPATIENT
Start: 2020-10-22

## 2020-10-22 RX ORDER — ATORVASTATIN CALCIUM 10 MG/1
10 TABLET, FILM COATED ORAL DAILY
Qty: 90 TABLET | Refills: 4 | Status: SHIPPED | OUTPATIENT
Start: 2020-10-22 | End: 2021-10-22
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
## 2020-10-22 NOTE — LETTER
October 22, 2020    Cooper Pope Jr.  [address] [place] [address]             [facility]  [address] [address]  Phone: [phone]  Fax: [phone] TO WHOM IT CONCERNS:    Dr. Leslie,     Patient had multiple appointments scheduled with you for follow up of pain, but Mr. Cooper Subramanian was in the hospital due to an aortic dissection during this time. He spent over 2 week in the hospital and few days at home recovering and was unable to make the appointments. Please reschedule him for an appointment and consider providing him pain medication until his next appointment    If you have any questions or concerns, please don't hesitate to call.    Sincerely,        MD Simona Peguero LPN

## 2020-10-23 ENCOUNTER — TELEPHONE (OUTPATIENT)
Dept: VASCULAR SURGERY | Facility: CLINIC | Age: 65
End: 2020-10-23

## 2020-10-23 NOTE — TELEPHONE ENCOUNTER
----- Message from Lillie Mckeon sent at 10/23/2020  9:51 AM CDT -----  Regarding: Needs a 2 week FU appt  Contact: Al[place]/wife  Please call Mr. Pope's wife, she said he needs a FU appt next week with Dr. Guthrie.  [phone].    Thanks, Lillie

## 2020-11-04 ENCOUNTER — PATIENT OUTREACH (OUTPATIENT)
Dept: ADMINISTRATIVE | Facility: OTHER | Age: 65
End: 2020-11-04

## 2020-11-04 NOTE — PROGRESS NOTES
LINKS immunization registry not responding  Care Everywhere updated  Health Maintenance updated  Chart reviewed for overdue Proactive [facility] Encounters (DELMY) health maintenance testing (CRS, Breast Ca, Diabetic Eye Exam)   Orders entered:N/A

## 2020-11-05 ENCOUNTER — OFFICE VISIT (OUTPATIENT)
Dept: VASCULAR SURGERY | Facility: CLINIC | Age: 65
End: 2020-11-05
Attending: SURGERY
Payer: MEDICARE

## 2020-11-05 ENCOUNTER — HOSPITAL ENCOUNTER (OUTPATIENT)
Dept: RADIOLOGY | Facility: HOSPITAL | Age: 65
Discharge: HOME | End: 2020-11-05
Attending: STUDENT IN AN ORGANIZED HEALTH CARE EDUCATION/TRAINING PROGRAM
Payer: MEDICARE

## 2020-11-05 VITALS
DIASTOLIC BLOOD PRESSURE: 95 MMHG | WEIGHT: 130.06 LBS | SYSTOLIC BLOOD PRESSURE: 142 MMHG | HEART RATE: 64 BPM | TEMPERATURE: 98 F | BODY MASS INDEX: 18.62 KG/M2 | HEIGHT: 70 IN

## 2020-11-05 DIAGNOSIS — Z01.818 ENCOUNTER FOR OTHER PREPROCEDURAL EXAMINATION: ICD-10-CM

## 2020-11-05 DIAGNOSIS — I71.00 AORTIC ANEURYSM WITH DISSECTION: Primary | ICD-10-CM

## 2020-11-05 PROCEDURE — 99213 OFFICE O/P EST LOW 20 MIN: CPT | Mod: PBBFAC,25 | Performed by: SURGERY

## 2020-11-05 PROCEDURE — 99999 PR PBB SHADOW E&M-EST. PATIENT-LVL III: CPT | Mod: PBBFAC,,, | Performed by: SURGERY

## 2020-11-05 PROCEDURE — 74174 CTA CHEST ABDOMEN PELVIS: ICD-10-PCS | Mod: 26,,, | Performed by: RADIOLOGY

## 2020-11-05 PROCEDURE — 71275 CT ANGIOGRAPHY CHEST: CPT | Mod: 26,,, | Performed by: RADIOLOGY

## 2020-11-05 PROCEDURE — 71275 CT ANGIOGRAPHY CHEST: CPT | Mod: TC

## 2020-11-05 PROCEDURE — 25500020 PHARM REV CODE 255: Performed by: STUDENT IN AN ORGANIZED HEALTH CARE EDUCATION/TRAINING PROGRAM

## 2020-11-05 PROCEDURE — 99214 OFFICE O/P EST MOD 30 MIN: CPT | Mod: S$PBB,,, | Performed by: SURGERY

## 2020-11-05 PROCEDURE — 71275 CTA CHEST ABDOMEN PELVIS: ICD-10-PCS | Mod: 26,,, | Performed by: RADIOLOGY

## 2020-11-05 PROCEDURE — 99999 PR PBB SHADOW E&M-EST. PATIENT-LVL III: ICD-10-PCS | Mod: PBBFAC,,, | Performed by: SURGERY

## 2020-11-05 PROCEDURE — 74174 CTA ABD&PLVS W/CONTRAST: CPT | Mod: 26,,, | Performed by: RADIOLOGY

## 2020-11-05 PROCEDURE — 99214 PR OFFICE/OUTPT VISIT, EST, LEVL IV, 30-39 MIN: ICD-10-PCS | Mod: S$PBB,,, | Performed by: SURGERY

## 2020-11-05 RX ADMIN — IOHEXOL 75 ML: 350 INJECTION, SOLUTION INTRAVENOUS at 12:11
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
## 2020-12-02 ENCOUNTER — TELEPHONE (OUTPATIENT)
Dept: VASCULAR SURGERY | Facility: CLINIC | Age: 65
End: 2020-12-02

## 2020-12-02 NOTE — TELEPHONE ENCOUNTER
----- Message from Brook Aguilera sent at 12/2/2020 12:00 PM CST -----  Regarding: Pt wife  Reason: Calling regard to pt being schedule for surgery. Please call     Communication: [phone]

## 2020-12-02 NOTE — TELEPHONE ENCOUNTER
Appointment was scheduled for the pt to gone in to RTC to discuss surgical planning in the coming weeks

## 2020-12-09 ENCOUNTER — PATIENT OUTREACH (OUTPATIENT)
Dept: ADMINISTRATIVE | Facility: OTHER | Age: 65
End: 2020-12-09

## 2020-12-10 ENCOUNTER — OFFICE VISIT (OUTPATIENT)
Dept: VASCULAR SURGERY | Facility: CLINIC | Age: 65
End: 2020-12-10
Attending: SURGERY
Payer: MEDICARE

## 2020-12-10 VITALS
TEMPERATURE: 98 F | DIASTOLIC BLOOD PRESSURE: 95 MMHG | HEART RATE: 77 BPM | BODY MASS INDEX: 17.53 KG/M2 | HEIGHT: 72 IN | SYSTOLIC BLOOD PRESSURE: 137 MMHG | WEIGHT: 129.44 LBS

## 2020-12-10 DIAGNOSIS — I71.00 AORTIC ANEURYSM WITH DISSECTION: Primary | ICD-10-CM

## 2020-12-10 DIAGNOSIS — I71.40 ABDOMINAL AORTIC ANEURYSM, WITHOUT RUPTURE: ICD-10-CM

## 2020-12-10 PROCEDURE — 99999 PR PBB SHADOW E&M-EST. PATIENT-LVL IV: ICD-10-PCS | Mod: PBBFAC,,, | Performed by: SURGERY

## 2020-12-10 PROCEDURE — 99214 PR OFFICE/OUTPT VISIT, EST, LEVL IV, 30-39 MIN: ICD-10-PCS | Mod: S$PBB,,, | Performed by: SURGERY

## 2020-12-10 PROCEDURE — 99999 PR PBB SHADOW E&M-EST. PATIENT-LVL IV: CPT | Mod: PBBFAC,,, | Performed by: SURGERY

## 2020-12-10 PROCEDURE — 99214 OFFICE O/P EST MOD 30 MIN: CPT | Mod: S$PBB,,, | Performed by: SURGERY

## 2020-12-10 PROCEDURE — 99214 OFFICE O/P EST MOD 30 MIN: CPT | Mod: PBBFAC | Performed by: SURGERY
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
## 2020-12-11 ENCOUNTER — TELEPHONE (OUTPATIENT)
Dept: CARDIOTHORACIC SURGERY | Facility: CLINIC | Age: 65
End: 2020-12-11

## 2020-12-11 NOTE — TELEPHONE ENCOUNTER
Called to confirm pt's appt with Dr. Henley on 12/14.  Spoke with pt's wife who verbalized understanding of time and location.

## 2020-12-14 ENCOUNTER — OFFICE VISIT (OUTPATIENT)
Dept: CARDIOTHORACIC SURGERY | Facility: CLINIC | Age: 65
End: 2020-12-14
Payer: MEDICARE

## 2020-12-14 VITALS
TEMPERATURE: 99 F | HEIGHT: 72 IN | OXYGEN SATURATION: 100 % | WEIGHT: 131.19 LBS | SYSTOLIC BLOOD PRESSURE: 169 MMHG | DIASTOLIC BLOOD PRESSURE: 114 MMHG | HEART RATE: 75 BPM | BODY MASS INDEX: 17.77 KG/M2

## 2020-12-14 DIAGNOSIS — T14.8XXA INTRAMURAL HEMATOMA: Primary | ICD-10-CM

## 2020-12-14 PROCEDURE — 99205 OFFICE O/P NEW HI 60 MIN: CPT | Mod: S$PBB,,, | Performed by: THORACIC SURGERY (CARDIOTHORACIC VASCULAR SURGERY)

## 2020-12-14 PROCEDURE — 99213 OFFICE O/P EST LOW 20 MIN: CPT | Mod: PBBFAC | Performed by: THORACIC SURGERY (CARDIOTHORACIC VASCULAR SURGERY)

## 2020-12-14 PROCEDURE — 99205 PR OFFICE/OUTPT VISIT, NEW, LEVL V, 60-74 MIN: ICD-10-PCS | Mod: S$PBB,,, | Performed by: THORACIC SURGERY (CARDIOTHORACIC VASCULAR SURGERY)

## 2020-12-14 PROCEDURE — 99999 PR PBB SHADOW E&M-EST. PATIENT-LVL III: ICD-10-PCS | Mod: PBBFAC,,, | Performed by: THORACIC SURGERY (CARDIOTHORACIC VASCULAR SURGERY)

## 2020-12-14 PROCEDURE — 99999 PR PBB SHADOW E&M-EST. PATIENT-LVL III: CPT | Mod: PBBFAC,,, | Performed by: THORACIC SURGERY (CARDIOTHORACIC VASCULAR SURGERY)
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
## 2020-12-14 NOTE — LETTER
December 23, 2020      Jerrod Guthrie MD  [address]  [facility] [address]           [facility] 2nd Fl  [address]  [facility] [address]  Phone: [phone]          Patient: Cooper Pope Jr.   MR Number: [id number]   YOB: 1955   Date of Visit: 12/14/2020       Dear Dr. Jerrod Guthrie:    Thank you for referring Cooper Pope to me for evaluation. Attached you will find relevant portions of my assessment and plan of care.    If you have questions, please do not hesitate to call me. I look forward to following Cooper Pope along with you.    Sincerely,    Nael Henley MD    Enclosure  CC:  No Recipients    If you would like to receive this communication electronically, please contact [email or web address] or [phone] to request more information on [facility] Link access.    For providers and/or their staff who would like to refer a patient to [facility], please contact us through our one-stop-shop provider referral line, [facility], at [phone].    If you feel you have received this communication in error or would no longer like to receive these types of communications, please e-mail [email or web address]

## 2020-12-17 ENCOUNTER — HOSPITAL ENCOUNTER (OUTPATIENT)
Dept: RADIOLOGY | Facility: HOSPITAL | Age: 65
Discharge: HOME | End: 2020-12-17
Attending: SURGERY
Payer: MEDICARE

## 2020-12-17 DIAGNOSIS — I71.40 ABDOMINAL AORTIC ANEURYSM, WITHOUT RUPTURE: ICD-10-CM

## 2020-12-21 ENCOUNTER — HOSPITAL ENCOUNTER (OUTPATIENT)
Dept: RADIOLOGY | Facility: HOSPITAL | Age: 65
Discharge: HOME | End: 2020-12-21
Attending: SURGERY
Payer: MEDICARE

## 2020-12-21 PROCEDURE — 25500020 PHARM REV CODE 255: Performed by: SURGERY

## 2020-12-21 PROCEDURE — 71275 CT ANGIOGRAPHY CHEST: CPT | Mod: TC

## 2020-12-21 RX ADMIN — IOHEXOL 100 ML: 350 INJECTION, SOLUTION INTRAVENOUS at 03:12

## 2020-12-28 ENCOUNTER — TELEPHONE (OUTPATIENT)
Dept: VASCULAR SURGERY | Facility: CLINIC | Age: 65
End: 2020-12-28

## 2020-12-28 NOTE — TELEPHONE ENCOUNTER
Contacted patient in response to message. Pt states he was supposed to be scheduled for aortic repair with Dr. Guthrie on 12/30/20, however he has not received any information. Notified patient that at this time he is not scheduled for a surgery with Dr. Guthrie and it seems that Dr. Guthrie wanted to form a plan for multi-stage repair. Will send message to YVONNE Nichole requesting return call to patient to discuss plan. Pt verbalized understanding.  ----- Message from Brook Aguilera sent at 12/28/2020 10:09 AM CST -----  Regarding: Pt Wife Alba  Reason: Calling to speak with nurse regarding pt surgery on the 30th. Pt state he doesn't have any instructions. Please call     Communication: [phone]

## 2021-01-04 NOTE — ED NOTES
Patient on continuous cardiac monitor, automatic blood pressure cuff and continuous pulse oximeter. VSS on infusions. AAOx3. Side rails up and bed in lowest position. Call light in reach. Urinal within reach. Instructed patient to call staff for assistance with mobility. Will continue to monitor.   Continue Regimen: Continue keflex as directed Detail Level: Zone

## 2021-02-09 ENCOUNTER — HOSPITAL ENCOUNTER (EMERGENCY)
Facility: HOSPITAL | Age: 66
Discharge: HOME | End: 2021-02-09
Attending: EMERGENCY MEDICINE
Payer: MEDICARE

## 2021-02-09 VITALS
DIASTOLIC BLOOD PRESSURE: 104 MMHG | HEART RATE: 78 BPM | TEMPERATURE: 100 F | BODY MASS INDEX: 17.32 KG/M2 | WEIGHT: 127.88 LBS | SYSTOLIC BLOOD PRESSURE: 176 MMHG | RESPIRATION RATE: 20 BRPM | HEIGHT: 72 IN | OXYGEN SATURATION: 99 %

## 2021-02-09 DIAGNOSIS — Z72.0 TOBACCO USE: ICD-10-CM

## 2021-02-09 DIAGNOSIS — I10 HYPERTENSION, UNSPECIFIED TYPE: ICD-10-CM

## 2021-02-09 DIAGNOSIS — R07.9 CHEST PAIN: ICD-10-CM

## 2021-02-09 DIAGNOSIS — I71.20 THORACIC AORTIC ANEURYSM WITHOUT RUPTURE: Primary | ICD-10-CM

## 2021-02-09 DIAGNOSIS — Z91.148 NONCOMPLIANCE WITH MEDICATION REGIMEN: ICD-10-CM

## 2021-02-09 LAB
ALBUMIN SERPL BCP-MCNC: 3.1 G/DL (ref 3.5–5.2)
ALP SERPL-CCNC: 80 U/L (ref 55–135)
ALT SERPL W/O P-5'-P-CCNC: 5 U/L (ref 10–44)
ANION GAP SERPL CALC-SCNC: 7 MMOL/L (ref 8–16)
AST SERPL-CCNC: 9 U/L (ref 10–40)
BASOPHILS # BLD AUTO: 0.04 K/UL (ref 0–0.2)
BASOPHILS NFR BLD: 0.9 % (ref 0–1.9)
BILIRUB SERPL-MCNC: 0.6 MG/DL (ref 0.1–1)
BNP SERPL-MCNC: 137 PG/ML (ref 0–99)
BUN SERPL-MCNC: 13 MG/DL (ref 8–23)
CALCIUM SERPL-MCNC: 8 MG/DL (ref 8.7–10.5)
CHLORIDE SERPL-SCNC: 111 MMOL/L (ref 95–110)
CO2 SERPL-SCNC: 22 MMOL/L (ref 23–29)
CREAT SERPL-MCNC: 0.9 MG/DL (ref 0.5–1.4)
DIFFERENTIAL METHOD: ABNORMAL
EOSINOPHIL # BLD AUTO: 0.1 K/UL (ref 0–0.5)
EOSINOPHIL NFR BLD: 1.4 % (ref 0–8)
ERYTHROCYTE [DISTWIDTH] IN BLOOD BY AUTOMATED COUNT: 16.6 % (ref 11.5–14.5)
EST. GFR  (AFRICAN AMERICAN): >60 ML/MIN/1.73 M^2
EST. GFR  (NON AFRICAN AMERICAN): >60 ML/MIN/1.73 M^2
GLUCOSE SERPL-MCNC: 97 MG/DL (ref 70–110)
HCT VFR BLD AUTO: 30.7 % (ref 40–54)
HGB BLD-MCNC: 9.7 G/DL (ref 14–18)
HIV 1+2 AB+HIV1 P24 AG SERPL QL IA: NEGATIVE
IMM GRANULOCYTES # BLD AUTO: 0.01 K/UL (ref 0–0.04)
IMM GRANULOCYTES NFR BLD AUTO: 0.2 % (ref 0–0.5)
LYMPHOCYTES # BLD AUTO: 0.8 K/UL (ref 1–4.8)
LYMPHOCYTES NFR BLD: 17.3 % (ref 18–48)
MCH RBC QN AUTO: 30.2 PG (ref 27–31)
MCHC RBC AUTO-ENTMCNC: 31.6 G/DL (ref 32–36)
MCV RBC AUTO: 96 FL (ref 82–98)
MONOCYTES # BLD AUTO: 0.3 K/UL (ref 0.3–1)
MONOCYTES NFR BLD: 7.7 % (ref 4–15)
NEUTROPHILS # BLD AUTO: 3.2 K/UL (ref 1.8–7.7)
NEUTROPHILS NFR BLD: 72.5 % (ref 38–73)
NRBC BLD-RTO: 0 /100 WBC
PLATELET # BLD AUTO: 164 K/UL (ref 150–350)
PMV BLD AUTO: 8.3 FL (ref 9.2–12.9)
POTASSIUM SERPL-SCNC: 3.4 MMOL/L (ref 3.5–5.1)
PROT SERPL-MCNC: 6.6 G/DL (ref 6–8.4)
RBC # BLD AUTO: 3.21 M/UL (ref 4.6–6.2)
SODIUM SERPL-SCNC: 140 MMOL/L (ref 136–145)
TROPONIN I SERPL DL<=0.01 NG/ML-MCNC: <0.006 NG/ML (ref 0–0.03)
TROPONIN I SERPL DL<=0.01 NG/ML-MCNC: <0.006 NG/ML (ref 0–0.03)
WBC # BLD AUTO: 4.4 K/UL (ref 3.9–12.7)

## 2021-02-09 PROCEDURE — 83880 ASSAY OF NATRIURETIC PEPTIDE: CPT

## 2021-02-09 PROCEDURE — 85025 COMPLETE CBC W/AUTO DIFF WBC: CPT

## 2021-02-09 PROCEDURE — 96374 THER/PROPH/DIAG INJ IV PUSH: CPT | Mod: 59

## 2021-02-09 PROCEDURE — 86703 HIV-1/HIV-2 1 RESULT ANTBDY: CPT

## 2021-02-09 PROCEDURE — 84484 ASSAY OF TROPONIN QUANT: CPT | Mod: 91

## 2021-02-09 PROCEDURE — 96376 TX/PRO/DX INJ SAME DRUG ADON: CPT

## 2021-02-09 PROCEDURE — 36415 COLL VENOUS BLD VENIPUNCTURE: CPT

## 2021-02-09 PROCEDURE — 93010 EKG 12-LEAD: ICD-10-PCS | Mod: ,,, | Performed by: INTERNAL MEDICINE

## 2021-02-09 PROCEDURE — 96375 TX/PRO/DX INJ NEW DRUG ADDON: CPT | Mod: 59

## 2021-02-09 PROCEDURE — 25000003 PHARM REV CODE 250: Performed by: EMERGENCY MEDICINE

## 2021-02-09 PROCEDURE — 63600175 PHARM REV CODE 636 W HCPCS: Performed by: EMERGENCY MEDICINE

## 2021-02-09 PROCEDURE — 25500020 PHARM REV CODE 255: Performed by: EMERGENCY MEDICINE

## 2021-02-09 PROCEDURE — 99285 EMERGENCY DEPT VISIT HI MDM: CPT | Mod: 25

## 2021-02-09 PROCEDURE — 93005 ELECTROCARDIOGRAM TRACING: CPT

## 2021-02-09 PROCEDURE — 93010 ELECTROCARDIOGRAM REPORT: CPT | Mod: ,,, | Performed by: INTERNAL MEDICINE

## 2021-02-09 PROCEDURE — 80053 COMPREHEN METABOLIC PANEL: CPT

## 2021-02-09 RX ORDER — ASPIRIN 325 MG
325 TABLET ORAL
Status: DISCONTINUED | OUTPATIENT
Start: 2021-02-09 | End: 2021-02-09 | Stop reason: HOSPADM

## 2021-02-09 RX ORDER — HYDRALAZINE HYDROCHLORIDE 20 MG/ML
10 INJECTION INTRAMUSCULAR; INTRAVENOUS
Status: COMPLETED | OUTPATIENT
Start: 2021-02-09 | End: 2021-02-09

## 2021-02-09 RX ORDER — MORPHINE SULFATE 2 MG/ML
4 INJECTION, SOLUTION INTRAMUSCULAR; INTRAVENOUS
Status: COMPLETED | OUTPATIENT
Start: 2021-02-09 | End: 2021-02-09

## 2021-02-09 RX ORDER — MORPHINE SULFATE 4 MG/ML
4 INJECTION, SOLUTION INTRAMUSCULAR; INTRAVENOUS
Status: DISCONTINUED | OUTPATIENT
Start: 2021-02-09 | End: 2021-02-09

## 2021-02-09 RX ORDER — MORPHINE SULFATE 4 MG/ML
4 INJECTION, SOLUTION INTRAMUSCULAR; INTRAVENOUS
Status: COMPLETED | OUTPATIENT
Start: 2021-02-09 | End: 2021-02-09

## 2021-02-09 RX ORDER — ONDANSETRON 4 MG/1
4 TABLET, ORALLY DISINTEGRATING ORAL EVERY 6 HOURS PRN
Qty: 20 TABLET | Refills: 0 | Status: SHIPPED | OUTPATIENT
Start: 2021-02-09

## 2021-02-09 RX ADMIN — MORPHINE SULFATE 4 MG: 2 INJECTION, SOLUTION INTRAMUSCULAR; INTRAVENOUS at 05:02

## 2021-02-09 RX ADMIN — NITROGLYCERIN 1 INCH: 20 OINTMENT TOPICAL at 12:02

## 2021-02-09 RX ADMIN — IOHEXOL 100 ML: 350 INJECTION, SOLUTION INTRAVENOUS at 05:02

## 2021-02-09 RX ADMIN — MORPHINE SULFATE 4 MG: 4 INJECTION INTRAVENOUS at 01:02

## 2021-02-09 RX ADMIN — HYDRALAZINE HYDROCHLORIDE 10 MG: 20 INJECTION, SOLUTION INTRAMUSCULAR; INTRAVENOUS at 02:02

## 2021-02-11 ENCOUNTER — TELEPHONE (OUTPATIENT)
Dept: VASCULAR SURGERY | Facility: CLINIC | Age: 66
End: 2021-02-11

## 2021-02-11 ENCOUNTER — OFFICE VISIT (OUTPATIENT)
Dept: VASCULAR SURGERY | Facility: CLINIC | Age: 66
End: 2021-02-11
Attending: SURGERY
Payer: MEDICARE

## 2021-02-11 VITALS
TEMPERATURE: 98 F | WEIGHT: 130.06 LBS | HEART RATE: 73 BPM | SYSTOLIC BLOOD PRESSURE: 91 MMHG | BODY MASS INDEX: 18.21 KG/M2 | DIASTOLIC BLOOD PRESSURE: 62 MMHG | HEIGHT: 71 IN

## 2021-02-11 DIAGNOSIS — I71.00 AORTIC ANEURYSM WITH DISSECTION: Primary | ICD-10-CM

## 2021-02-11 DIAGNOSIS — I71.40 ABDOMINAL AORTIC ANEURYSM, WITHOUT RUPTURE: ICD-10-CM

## 2021-02-11 DIAGNOSIS — I71.03 DISSECTION OF THORACOABDOMINAL AORTIC ANEURYSM (TAAA): Primary | ICD-10-CM

## 2021-02-11 PROCEDURE — 1125F PR PAIN SEVERITY QUANTIFIED, PAIN PRESENT: ICD-10-PCS | Mod: S$GLB,,, | Performed by: SURGERY

## 2021-02-11 PROCEDURE — 99999 PR PBB SHADOW E&M-EST. PATIENT-LVL IV: CPT | Mod: PBBFAC,,, | Performed by: SURGERY

## 2021-02-11 PROCEDURE — 3074F PR MOST RECENT SYSTOLIC BLOOD PRESSURE < 130 MM HG: ICD-10-PCS | Mod: CPTII,S$GLB,, | Performed by: SURGERY

## 2021-02-11 PROCEDURE — 1125F AMNT PAIN NOTED PAIN PRSNT: CPT | Mod: S$GLB,,, | Performed by: SURGERY

## 2021-02-11 PROCEDURE — 3074F SYST BP LT 130 MM HG: CPT | Mod: CPTII,S$GLB,, | Performed by: SURGERY

## 2021-02-11 PROCEDURE — 3078F DIAST BP <80 MM HG: CPT | Mod: CPTII,S$GLB,, | Performed by: SURGERY

## 2021-02-11 PROCEDURE — 99999 PR PBB SHADOW E&M-EST. PATIENT-LVL IV: ICD-10-PCS | Mod: PBBFAC,,, | Performed by: SURGERY

## 2021-02-11 PROCEDURE — 3008F PR BODY MASS INDEX (BMI) DOCUMENTED: ICD-10-PCS | Mod: CPTII,S$GLB,, | Performed by: SURGERY

## 2021-02-11 PROCEDURE — 3288F FALL RISK ASSESSMENT DOCD: CPT | Mod: CPTII,S$GLB,, | Performed by: SURGERY

## 2021-02-11 PROCEDURE — 99499 RISK ADDL DX/OHS AUDIT: ICD-10-PCS | Mod: S$GLB,,, | Performed by: SURGERY

## 2021-02-11 PROCEDURE — 1159F PR MEDICATION LIST DOCUMENTED IN MEDICAL RECORD: ICD-10-PCS | Mod: S$GLB,,, | Performed by: SURGERY

## 2021-02-11 PROCEDURE — 3288F PR FALLS RISK ASSESSMENT DOCUMENTED: ICD-10-PCS | Mod: CPTII,S$GLB,, | Performed by: SURGERY

## 2021-02-11 PROCEDURE — 3008F BODY MASS INDEX DOCD: CPT | Mod: CPTII,S$GLB,, | Performed by: SURGERY

## 2021-02-11 PROCEDURE — 99214 OFFICE O/P EST MOD 30 MIN: CPT | Mod: S$GLB,,, | Performed by: SURGERY

## 2021-02-11 PROCEDURE — 1100F PR PT FALLS ASSESS DOC 2+ FALLS/FALL W/INJURY/YR: ICD-10-PCS | Mod: CPTII,S$GLB,, | Performed by: SURGERY

## 2021-02-11 PROCEDURE — 1159F MED LIST DOCD IN RCRD: CPT | Mod: S$GLB,,, | Performed by: SURGERY

## 2021-02-11 PROCEDURE — 1100F PTFALLS ASSESS-DOCD GE2>/YR: CPT | Mod: CPTII,S$GLB,, | Performed by: SURGERY

## 2021-02-11 PROCEDURE — 99214 PR OFFICE/OUTPT VISIT, EST, LEVL IV, 30-39 MIN: ICD-10-PCS | Mod: S$GLB,,, | Performed by: SURGERY

## 2021-02-11 PROCEDURE — 99499 UNLISTED E&M SERVICE: CPT | Mod: S$GLB,,, | Performed by: SURGERY

## 2021-02-11 PROCEDURE — 3078F PR MOST RECENT DIASTOLIC BLOOD PRESSURE < 80 MM HG: ICD-10-PCS | Mod: CPTII,S$GLB,, | Performed by: SURGERY

## 2021-02-17 NOTE — TELEPHONE ENCOUNTER
Lvm for patient to call office back, patient is overdue for care gaps and wanted to notify patient an order has been placed for overdue care gaps and patient is welcome to schedule when convenient for patient.     Health Maintenance Due   Topic Date Due   • Abdominal Aortic Aneurysm (AAA) Screening  08/23/2019            Spoke with the patient.

## 2021-02-22 ENCOUNTER — OFFICE VISIT (OUTPATIENT)
Dept: FAMILY MEDICINE | Facility: CLINIC | Age: 66
End: 2021-02-22
Payer: MEDICARE

## 2021-02-22 ENCOUNTER — HOSPITAL ENCOUNTER (OUTPATIENT)
Dept: RADIOLOGY | Facility: HOSPITAL | Age: 66
Discharge: HOME | End: 2021-02-22
Attending: FAMILY MEDICINE
Payer: MEDICARE

## 2021-02-22 VITALS
WEIGHT: 127.63 LBS | BODY MASS INDEX: 17.87 KG/M2 | OXYGEN SATURATION: 100 % | SYSTOLIC BLOOD PRESSURE: 105 MMHG | DIASTOLIC BLOOD PRESSURE: 76 MMHG | TEMPERATURE: 97 F | HEART RATE: 105 BPM | HEIGHT: 71 IN

## 2021-02-22 DIAGNOSIS — D64.9 ANEMIA, UNSPECIFIED TYPE: ICD-10-CM

## 2021-02-22 DIAGNOSIS — C64.1 MALIGNANT NEOPLASM OF RIGHT KIDNEY: ICD-10-CM

## 2021-02-22 DIAGNOSIS — R63.4 WEIGHT LOSS: ICD-10-CM

## 2021-02-22 DIAGNOSIS — M54.12 RADICULOPATHY, CERVICAL REGION: ICD-10-CM

## 2021-02-22 DIAGNOSIS — I27.20 PULMONARY HYPERTENSION: ICD-10-CM

## 2021-02-22 DIAGNOSIS — M54.12 CERVICAL RADICULOPATHY: Primary | ICD-10-CM

## 2021-02-22 DIAGNOSIS — M48.061 SPINAL STENOSIS OF LUMBAR REGION, UNSPECIFIED WHETHER NEUROGENIC CLAUDICATION PRESENT: ICD-10-CM

## 2021-02-22 DIAGNOSIS — F01.518 VASCULAR DEMENTIA WITH BEHAVIOR DISTURBANCE: ICD-10-CM

## 2021-02-22 DIAGNOSIS — I10 ESSENTIAL HYPERTENSION: ICD-10-CM

## 2021-02-22 DIAGNOSIS — M54.12 CERVICAL RADICULOPATHY: ICD-10-CM

## 2021-02-22 DIAGNOSIS — R31.9 HEMATURIA, UNSPECIFIED TYPE: ICD-10-CM

## 2021-02-22 DIAGNOSIS — Z12.11 SCREENING FOR COLON CANCER: ICD-10-CM

## 2021-02-22 DIAGNOSIS — E43 UNSPECIFIED SEVERE PROTEIN-CALORIE MALNUTRITION: ICD-10-CM

## 2021-02-22 DIAGNOSIS — I71.9 AORTIC ANEURYSM WITHOUT RUPTURE, UNSPECIFIED PORTION OF AORTA: ICD-10-CM

## 2021-02-22 DIAGNOSIS — B18.2 HEP C W/O COMA, CHRONIC: ICD-10-CM

## 2021-02-22 DIAGNOSIS — G89.4 CHRONIC PAIN SYNDROME: ICD-10-CM

## 2021-02-22 PROCEDURE — 99499 UNLISTED E&M SERVICE: CPT | Mod: S$GLB,,, | Performed by: FAMILY MEDICINE

## 2021-02-22 PROCEDURE — 3074F SYST BP LT 130 MM HG: CPT | Mod: CPTII,S$GLB,, | Performed by: FAMILY MEDICINE

## 2021-02-22 PROCEDURE — 99214 OFFICE O/P EST MOD 30 MIN: CPT | Mod: S$GLB,,, | Performed by: FAMILY MEDICINE

## 2021-02-22 PROCEDURE — 1159F PR MEDICATION LIST DOCUMENTED IN MEDICAL RECORD: ICD-10-PCS | Mod: S$GLB,,, | Performed by: FAMILY MEDICINE

## 2021-02-22 PROCEDURE — 99999 PR PBB SHADOW E&M-EST. PATIENT-LVL V: CPT | Mod: PBBFAC,,, | Performed by: FAMILY MEDICINE

## 2021-02-22 PROCEDURE — 1100F PR PT FALLS ASSESS DOC 2+ FALLS/FALL W/INJURY/YR: ICD-10-PCS | Mod: CPTII,S$GLB,, | Performed by: FAMILY MEDICINE

## 2021-02-22 PROCEDURE — 72040 X-RAY EXAM NECK SPINE 2-3 VW: CPT | Mod: TC,FY,PO

## 2021-02-22 PROCEDURE — 1159F MED LIST DOCD IN RCRD: CPT | Mod: S$GLB,,, | Performed by: FAMILY MEDICINE

## 2021-02-22 PROCEDURE — 99214 PR OFFICE/OUTPT VISIT, EST, LEVL IV, 30-39 MIN: ICD-10-PCS | Mod: S$GLB,,, | Performed by: FAMILY MEDICINE

## 2021-02-22 PROCEDURE — 1125F AMNT PAIN NOTED PAIN PRSNT: CPT | Mod: S$GLB,,, | Performed by: FAMILY MEDICINE

## 2021-02-22 PROCEDURE — 3078F DIAST BP <80 MM HG: CPT | Mod: CPTII,S$GLB,, | Performed by: FAMILY MEDICINE

## 2021-02-22 PROCEDURE — 3288F PR FALLS RISK ASSESSMENT DOCUMENTED: ICD-10-PCS | Mod: CPTII,S$GLB,, | Performed by: FAMILY MEDICINE

## 2021-02-22 PROCEDURE — 3008F PR BODY MASS INDEX (BMI) DOCUMENTED: ICD-10-PCS | Mod: CPTII,S$GLB,, | Performed by: FAMILY MEDICINE

## 2021-02-22 PROCEDURE — 3074F PR MOST RECENT SYSTOLIC BLOOD PRESSURE < 130 MM HG: ICD-10-PCS | Mod: CPTII,S$GLB,, | Performed by: FAMILY MEDICINE

## 2021-02-22 PROCEDURE — 99499 RISK ADDL DX/OHS AUDIT: ICD-10-PCS | Mod: S$GLB,,, | Performed by: FAMILY MEDICINE

## 2021-02-22 PROCEDURE — 1100F PTFALLS ASSESS-DOCD GE2>/YR: CPT | Mod: CPTII,S$GLB,, | Performed by: FAMILY MEDICINE

## 2021-02-22 PROCEDURE — 3008F BODY MASS INDEX DOCD: CPT | Mod: CPTII,S$GLB,, | Performed by: FAMILY MEDICINE

## 2021-02-22 PROCEDURE — 72040 XR CERVICAL SPINE AP LATERAL: ICD-10-PCS | Mod: 26,,, | Performed by: RADIOLOGY

## 2021-02-22 PROCEDURE — 3288F FALL RISK ASSESSMENT DOCD: CPT | Mod: CPTII,S$GLB,, | Performed by: FAMILY MEDICINE

## 2021-02-22 PROCEDURE — 72040 X-RAY EXAM NECK SPINE 2-3 VW: CPT | Mod: 26,,, | Performed by: RADIOLOGY

## 2021-02-22 PROCEDURE — 1125F PR PAIN SEVERITY QUANTIFIED, PAIN PRESENT: ICD-10-PCS | Mod: S$GLB,,, | Performed by: FAMILY MEDICINE

## 2021-02-22 PROCEDURE — 99999 PR PBB SHADOW E&M-EST. PATIENT-LVL V: ICD-10-PCS | Mod: PBBFAC,,, | Performed by: FAMILY MEDICINE

## 2021-02-22 PROCEDURE — 3078F PR MOST RECENT DIASTOLIC BLOOD PRESSURE < 80 MM HG: ICD-10-PCS | Mod: CPTII,S$GLB,, | Performed by: FAMILY MEDICINE

## 2021-02-22 RX ORDER — CYPROHEPTADINE HYDROCHLORIDE 4 MG/1
4 TABLET ORAL 3 TIMES DAILY PRN
Qty: 90 TABLET | Refills: 2 | Status: SHIPPED | OUTPATIENT
Start: 2021-02-22 | End: 2021-05-06 | Stop reason: SDUPTHER

## 2021-02-23 ENCOUNTER — TELEPHONE (OUTPATIENT)
Dept: NEUROSURGERY | Facility: CLINIC | Age: 66
End: 2021-02-23

## 2021-02-24 ENCOUNTER — OFFICE VISIT (OUTPATIENT)
Dept: UROLOGY | Facility: CLINIC | Age: 66
End: 2021-02-24
Payer: MEDICARE

## 2021-02-24 ENCOUNTER — LAB VISIT (OUTPATIENT)
Dept: LAB | Facility: HOSPITAL | Age: 66
End: 2021-02-24
Attending: UROLOGY
Payer: MEDICARE

## 2021-02-24 VITALS
SYSTOLIC BLOOD PRESSURE: 122 MMHG | DIASTOLIC BLOOD PRESSURE: 68 MMHG | WEIGHT: 128.88 LBS | BODY MASS INDEX: 17.97 KG/M2

## 2021-02-24 DIAGNOSIS — C64.9 MALIGNANT NEOPLASM OF KIDNEY, UNSPECIFIED LATERALITY: Primary | ICD-10-CM

## 2021-02-24 DIAGNOSIS — Z12.5 PROSTATE CANCER SCREENING: ICD-10-CM

## 2021-02-24 DIAGNOSIS — R31.9 HEMATURIA, UNSPECIFIED TYPE: ICD-10-CM

## 2021-02-24 DIAGNOSIS — C64.9 MALIGNANT NEOPLASM OF KIDNEY, UNSPECIFIED LATERALITY: ICD-10-CM

## 2021-02-24 DIAGNOSIS — C68.9 MALIGNANT NEOPLASM OF URINARY ORGAN, UNSPECIFIED: ICD-10-CM

## 2021-02-24 DIAGNOSIS — R33.9 URINARY RETENTION: ICD-10-CM

## 2021-02-24 LAB
BILIRUB SERPL-MCNC: ABNORMAL MG/DL
BLOOD URINE, POC: ABNORMAL
CLARITY, POC UA: CLEAR
COLOR, POC UA: ABNORMAL
GLUCOSE UR QL STRIP: ABNORMAL
KETONES UR QL STRIP: ABNORMAL
LEUKOCYTE ESTERASE URINE, POC: ABNORMAL
NITRITE, POC UA: ABNORMAL
PH, POC UA: 5
POC RESIDUAL URINE VOLUME: 1 ML (ref 0–100)
PROTEIN, POC: ABNORMAL
SPECIFIC GRAVITY, POC UA: 1.01
UROBILINOGEN, POC UA: 4

## 2021-02-24 PROCEDURE — 3074F SYST BP LT 130 MM HG: CPT | Mod: CPTII,S$GLB,, | Performed by: UROLOGY

## 2021-02-24 PROCEDURE — 99214 PR OFFICE/OUTPT VISIT, EST, LEVL IV, 30-39 MIN: ICD-10-PCS | Mod: 25,S$GLB,, | Performed by: UROLOGY

## 2021-02-24 PROCEDURE — 3288F FALL RISK ASSESSMENT DOCD: CPT | Mod: CPTII,S$GLB,, | Performed by: UROLOGY

## 2021-02-24 PROCEDURE — 3078F DIAST BP <80 MM HG: CPT | Mod: CPTII,S$GLB,, | Performed by: UROLOGY

## 2021-02-24 PROCEDURE — 3288F PR FALLS RISK ASSESSMENT DOCUMENTED: ICD-10-PCS | Mod: CPTII,S$GLB,, | Performed by: UROLOGY

## 2021-02-24 PROCEDURE — 99499 RISK ADDL DX/OHS AUDIT: ICD-10-PCS | Mod: S$GLB,,, | Performed by: UROLOGY

## 2021-02-24 PROCEDURE — 51798 US URINE CAPACITY MEASURE: CPT | Mod: S$GLB,,, | Performed by: UROLOGY

## 2021-02-24 PROCEDURE — 84153 ASSAY OF PSA TOTAL: CPT

## 2021-02-24 PROCEDURE — 99999 PR PBB SHADOW E&M-EST. PATIENT-LVL IV: CPT | Mod: PBBFAC,,, | Performed by: UROLOGY

## 2021-02-24 PROCEDURE — 3008F BODY MASS INDEX DOCD: CPT | Mod: CPTII,S$GLB,, | Performed by: UROLOGY

## 2021-02-24 PROCEDURE — 3008F PR BODY MASS INDEX (BMI) DOCUMENTED: ICD-10-PCS | Mod: CPTII,S$GLB,, | Performed by: UROLOGY

## 2021-02-24 PROCEDURE — 3074F PR MOST RECENT SYSTOLIC BLOOD PRESSURE < 130 MM HG: ICD-10-PCS | Mod: CPTII,S$GLB,, | Performed by: UROLOGY

## 2021-02-24 PROCEDURE — 99999 PR PBB SHADOW E&M-EST. PATIENT-LVL IV: ICD-10-PCS | Mod: PBBFAC,,, | Performed by: UROLOGY

## 2021-02-24 PROCEDURE — 99499 UNLISTED E&M SERVICE: CPT | Mod: S$GLB,,, | Performed by: UROLOGY

## 2021-02-24 PROCEDURE — 51798 POCT BLADDER SCAN: ICD-10-PCS | Mod: S$GLB,,, | Performed by: UROLOGY

## 2021-02-24 PROCEDURE — 1100F PTFALLS ASSESS-DOCD GE2>/YR: CPT | Mod: CPTII,S$GLB,, | Performed by: UROLOGY

## 2021-02-24 PROCEDURE — 1125F PR PAIN SEVERITY QUANTIFIED, PAIN PRESENT: ICD-10-PCS | Mod: S$GLB,,, | Performed by: UROLOGY

## 2021-02-24 PROCEDURE — 1159F PR MEDICATION LIST DOCUMENTED IN MEDICAL RECORD: ICD-10-PCS | Mod: S$GLB,,, | Performed by: UROLOGY

## 2021-02-24 PROCEDURE — 99214 OFFICE O/P EST MOD 30 MIN: CPT | Mod: 25,S$GLB,, | Performed by: UROLOGY

## 2021-02-24 PROCEDURE — 36415 COLL VENOUS BLD VENIPUNCTURE: CPT

## 2021-02-24 PROCEDURE — 3078F PR MOST RECENT DIASTOLIC BLOOD PRESSURE < 80 MM HG: ICD-10-PCS | Mod: CPTII,S$GLB,, | Performed by: UROLOGY

## 2021-02-24 PROCEDURE — 1159F MED LIST DOCD IN RCRD: CPT | Mod: S$GLB,,, | Performed by: UROLOGY

## 2021-02-24 PROCEDURE — 81002 URINALYSIS NONAUTO W/O SCOPE: CPT | Mod: S$GLB,,, | Performed by: UROLOGY

## 2021-02-24 PROCEDURE — 1100F PR PT FALLS ASSESS DOC 2+ FALLS/FALL W/INJURY/YR: ICD-10-PCS | Mod: CPTII,S$GLB,, | Performed by: UROLOGY

## 2021-02-24 PROCEDURE — 1125F AMNT PAIN NOTED PAIN PRSNT: CPT | Mod: S$GLB,,, | Performed by: UROLOGY

## 2021-02-24 PROCEDURE — 81002 POCT URINE DIPSTICK WITHOUT MICROSCOPE: ICD-10-PCS | Mod: S$GLB,,, | Performed by: UROLOGY

## 2021-02-25 ENCOUNTER — HOSPITAL ENCOUNTER (OUTPATIENT)
Dept: RADIOLOGY | Facility: HOSPITAL | Age: 66
Discharge: HOME | End: 2021-02-25
Attending: FAMILY MEDICINE
Payer: MEDICARE

## 2021-02-25 DIAGNOSIS — E87.5 HYPERKALEMIA: Primary | ICD-10-CM

## 2021-02-25 DIAGNOSIS — R31.9 HEMATURIA, UNSPECIFIED TYPE: ICD-10-CM

## 2021-02-25 DIAGNOSIS — M54.12 RADICULOPATHY, CERVICAL REGION: ICD-10-CM

## 2021-02-25 DIAGNOSIS — N17.9 AKI (ACUTE KIDNEY INJURY): ICD-10-CM

## 2021-02-25 LAB — COMPLEXED PSA SERPL-MCNC: 1.4 NG/ML (ref 0–4)

## 2021-02-25 PROCEDURE — 72141 MRI CERVICAL SPINE WITHOUT CONTRAST: ICD-10-PCS | Mod: 26,,, | Performed by: RADIOLOGY

## 2021-02-25 PROCEDURE — 72141 MRI NECK SPINE W/O DYE: CPT | Mod: 26,,, | Performed by: RADIOLOGY

## 2021-02-25 PROCEDURE — 72141 MRI NECK SPINE W/O DYE: CPT | Mod: TC

## 2021-03-01 DIAGNOSIS — J90 PLEURAL EFFUSION: Primary | ICD-10-CM

## 2021-03-02 ENCOUNTER — TELEPHONE (OUTPATIENT)
Dept: NEUROSURGERY | Facility: CLINIC | Age: 66
End: 2021-03-02

## 2021-03-02 ENCOUNTER — PATIENT OUTREACH (OUTPATIENT)
Dept: ADMINISTRATIVE | Facility: OTHER | Age: 66
End: 2021-03-02

## 2021-03-03 ENCOUNTER — TELEPHONE (OUTPATIENT)
Dept: NEUROSURGERY | Facility: CLINIC | Age: 66
End: 2021-03-03

## 2021-03-08 ENCOUNTER — TELEPHONE (OUTPATIENT)
Dept: ENDOSCOPY | Facility: HOSPITAL | Age: 66
End: 2021-03-08

## 2021-03-08 ENCOUNTER — PATIENT MESSAGE (OUTPATIENT)
Dept: ENDOSCOPY | Facility: HOSPITAL | Age: 66
End: 2021-03-08

## 2021-03-08 DIAGNOSIS — Z12.11 COLON CANCER SCREENING: ICD-10-CM

## 2021-03-09 RX ORDER — SODIUM, POTASSIUM,MAG SULFATES 17.5-3.13G
1 SOLUTION, RECONSTITUTED, ORAL ORAL DAILY
Qty: 1 KIT | Refills: 0 | Status: SHIPPED | OUTPATIENT
Start: 2021-03-09 | End: 2021-03-11

## 2021-03-10 ENCOUNTER — TELEPHONE (OUTPATIENT)
Dept: ENDOSCOPY | Facility: HOSPITAL | Age: 66
End: 2021-03-10

## 2021-03-17 DIAGNOSIS — Z01.818 PREOPERATIVE EXAMINATION: ICD-10-CM

## 2021-03-24 NOTE — TRANSFER OF CARE
Anesthesia Transfer of Care Note    Patient: Cooper Pope Jr.    Procedure(s) Performed: Procedure(s) (LRB):  COLONOSCOPY (N/A)    Patient location: PACU    Anesthesia Type: MAC    Transport from OR: Transported from OR on room air with adequate spontaneous ventilation    Post pain: adequate analgesia    Post assessment: no apparent anesthetic complications and tolerated procedure well    Post vital signs: stable    Level of consciousness: awake, alert and confused (pt has dementia)    Nausea/Vomiting: no nausea/vomiting    Complications: none    Transfer of care protocol was followed      Last vitals:   Visit Vitals  BP (!) 176/113 (BP Location: Left arm, Patient Position: Lying)   Pulse 64   Temp 37 °C (98.6 °F)   Resp 20   Ht 6' (1.829 m)   Wt 65.3 kg (144 lb)   SpO2 (!) 94%   BMI 19.53 kg/m²      3

## 2021-04-29 NOTE — TELEPHONE ENCOUNTER
Contacted pt. Pt requesting MRI. Per , complete PT 8 weeks before MRI order. Pt notified. Pt would like to come to [facility] for PT. Orders in. All questions answered.//lp   - - -

## 2021-05-06 DIAGNOSIS — R63.4 WEIGHT LOSS: ICD-10-CM

## 2021-05-06 DIAGNOSIS — I16.0 HYPERTENSIVE URGENCY: ICD-10-CM

## 2021-05-06 RX ORDER — CLONIDINE HYDROCHLORIDE 0.3 MG/1
0.3 TABLET ORAL 3 TIMES DAILY
Qty: 90 TABLET | Refills: 0 | Status: SHIPPED | OUTPATIENT
Start: 2021-05-06

## 2021-05-06 RX ORDER — CYPROHEPTADINE HYDROCHLORIDE 4 MG/1
4 TABLET ORAL 3 TIMES DAILY PRN
Qty: 90 TABLET | Refills: 0 | Status: SHIPPED | OUTPATIENT
Start: 2021-05-06

## 2021-11-03 DIAGNOSIS — T14.8XXA INTRAMURAL HEMATOMA: Primary | ICD-10-CM
